# Patient Record
Sex: FEMALE | Race: WHITE | NOT HISPANIC OR LATINO | Employment: FULL TIME | URBAN - METROPOLITAN AREA
[De-identification: names, ages, dates, MRNs, and addresses within clinical notes are randomized per-mention and may not be internally consistent; named-entity substitution may affect disease eponyms.]

---

## 2017-01-31 RX ORDER — FLUOXETINE 10 MG/1
CAPSULE ORAL
Qty: 30 CAPSULE | Refills: 0 | Status: SHIPPED | OUTPATIENT
Start: 2017-01-31 | End: 2018-03-16 | Stop reason: ALTCHOICE

## 2017-02-06 RX ORDER — BUDESONIDE 3 MG/1
CAPSULE, COATED PELLETS ORAL
Qty: 90 CAPSULE | Refills: 0 | Status: SHIPPED | OUTPATIENT
Start: 2017-02-06 | End: 2017-03-09 | Stop reason: SDUPTHER

## 2017-03-10 RX ORDER — BUDESONIDE 3 MG/1
CAPSULE, COATED PELLETS ORAL
Qty: 90 CAPSULE | Refills: 0 | Status: SHIPPED | OUTPATIENT
Start: 2017-03-10 | End: 2017-04-09

## 2017-03-29 ENCOUNTER — OFFICE VISIT (OUTPATIENT)
Dept: OBSTETRICS AND GYNECOLOGY | Facility: CLINIC | Age: 27
End: 2017-03-29
Payer: COMMERCIAL

## 2017-03-29 VITALS
BODY MASS INDEX: 25.2 KG/M2 | HEIGHT: 63 IN | DIASTOLIC BLOOD PRESSURE: 70 MMHG | WEIGHT: 142.19 LBS | SYSTOLIC BLOOD PRESSURE: 120 MMHG

## 2017-03-29 DIAGNOSIS — Z11.3 SCREEN FOR STD (SEXUALLY TRANSMITTED DISEASE): ICD-10-CM

## 2017-03-29 DIAGNOSIS — Z01.419 ENCOUNTER FOR GYNECOLOGICAL EXAMINATION: Primary | ICD-10-CM

## 2017-03-29 PROCEDURE — 99395 PREV VISIT EST AGE 18-39: CPT | Mod: S$GLB,,, | Performed by: OBSTETRICS & GYNECOLOGY

## 2017-03-29 PROCEDURE — 99999 PR PBB SHADOW E&M-EST. PATIENT-LVL II: CPT | Mod: PBBFAC,,, | Performed by: OBSTETRICS & GYNECOLOGY

## 2017-03-29 PROCEDURE — 87591 N.GONORRHOEAE DNA AMP PROB: CPT

## 2017-03-29 RX ORDER — MISOPROSTOL 200 UG/1
200 TABLET ORAL SEE ADMIN INSTRUCTIONS
Qty: 2 TABLET | Refills: 0 | Status: SHIPPED | OUTPATIENT
Start: 2017-03-29 | End: 2017-07-07 | Stop reason: ALTCHOICE

## 2017-03-29 NOTE — PROGRESS NOTES
HPI: Pt is a 27 y.o.  female who presents for routine annual exam. She uses nothing for contraception as she has NEVER been sexually active.  She is in a new relationship and is considering becoming sexually active and would like to have IUD placed.      ROS:  GENERAL: Feeling well overall. Denies fever or chills.   SKIN: Denies rash or lesions.   HEAD: Denies head injury or headache.   NODES: Denies enlarged lymph nodes.   CHEST: Denies chest pain or shortness of breath.   CARDIOVASCULAR: Denies palpitations or left sided chest pain.   ABDOMEN: No abdominal pain, constipation, diarrhea, nausea, vomiting or rectal bleeding.   URINARY: No dysuria, hematuria, or burning on urination.  REPRODUCTIVE: See HPI.   BREASTS: Denies pain, lumps, or nipple discharge.   HEMATOLOGIC: No easy bruisability or excessive bleeding.   MUSCULOSKELETAL: Denies joint pain or swelling.   NEUROLOGIC: Denies syncope or weakness.   PSYCHIATRIC: Denies depression, anxiety or mood swings.    PE:   APPEARANCE: Well nourished, well developed, White female in no acute distress.  NODES: no cervical, supraclavicular, or inguinal lymphadenopathy  BREASTS: Symmetrical, no skin changes or visible lesions. No palpable masses, nipple discharge or adenopathy bilaterally.  ABDOMEN: Soft. No tenderness or masses. No distention. No hernias palpated. No CVA tenderness.  VULVA: No lesions. Normal external female genitalia.  URETHRAL MEATUS: Normal size and location, no lesions, no prolapse.  URETHRA: No masses, tenderness, or prolapse.  VAGINA: Moist. No lesions or lacerations noted. No abnormal discharge present. No odor present.   CERVIX: No lesions or discharge. No cervical motion tenderness.   UTERUS: Normal size, regular shape, mobile, non-tender.  ADNEXA: No tenderness. No fullness or masses palpated in the adnexal regions.   ANUS PERINEUM: Normal.      Diagnosis:  1. Encounter for gynecological examination    2. Screen for STD (sexually transmitted  disease)        Plan:     Orders Placed This Encounter    C. trachomatis/N. gonorrhoeae by AMP DNA Cervix    misoprostol (CYTOTEC) 200 MCG Tab       - STD screening done as she plans for IUD  - Belkys verification  - will call with next menses    Patient was counseled today on the new ACS guidelines for cervical cytology screening as well as the current recommendations for breast cancer screening. She was counseled to follow up with her PCP for other routine health maintenance. Counseling session lasted approximately 10 minutes, and all her questions were answered.    Follow-up with me at next menses for Belkys insertion

## 2017-03-29 NOTE — MR AVS SNAPSHOT
"    [facility] [address]  [address]  [facility] [address]  Phone: [phone]  Fax: [phone]                  Terri Tavera   3/29/2017 3:45 PM   Office Visit    Description:  Female : 1990   Provider:  Chiara Olivarez MD   Department:  [facility] [address]           Reason for Visit     Gynecologic Exam                To Do List           Goals (5 Years of Data)     None      [facility]     [facility] On [facility] Nurse [facility] -  Assistance  Registered nurses in the [facility] On Call Center provide clinical advisement, health education, appointment booking, and other advisory services.  Call for this free service at [phone].             Medications           Message regarding Medications     Verify the changes and/or additions to your medication regime listed below are the same as discussed with your clinician today.  If any of these changes or additions are incorrect, please notify your healthcare provider.             Verify that the below list of medications is an accurate representation of the medications you are currently taking.  If none reported, the list may be blank. If incorrect, please contact your healthcare provider. Carry this list with you in case of emergency.           Current Medications     alprazolam (XANAX) 0.5 MG tablet Take 0.5 mg by mouth nightly as needed.      citalopram (CELEXA) 40 MG tablet            Clinical Reference Information           Your Vitals Were     BP Height Weight Last Period BMI    120/70 5' 3" (1.6 m) 64.5 kg (142 lb 3.2 oz) 03/15/2017 (Exact Date) 25.19 kg/m2      Blood Pressure          Most Recent Value    BP  120/70      Allergies as of 3/29/2017     Duricef [Cefadroxil]    Vitamin K Analogues    Bactrim [Sulfamethoxazole-trimethoprim]      Immunizations Administered on Date of Encounter - 3/29/2017     None      Language Assistance Services     ATTENTION: Language assistance services are available, free of " charge. Please call [phone].      ATENCIÓN: Si habla español, tiene a haley disposición servicios gratuitos de asistencia lingüística. Llame al [phone].     CHÚ Ý: N?u b?n nói Ti?ng Vi?t, có các d?ch v? h? tr? ngôn ng? mi?n phí dành cho b?n. G?i s? [phone].         [facility] [address] complies with applicable Federal civil rights laws and does not discriminate on the basis of race, color, national origin, age, disability, or sex.

## 2017-03-31 LAB
C TRACH DNA SPEC QL NAA+PROBE: NOT DETECTED
N GONORRHOEA DNA SPEC QL NAA+PROBE: NOT DETECTED

## 2017-04-07 ENCOUNTER — PROCEDURE VISIT (OUTPATIENT)
Dept: OBSTETRICS AND GYNECOLOGY | Facility: CLINIC | Age: 27
End: 2017-04-07
Payer: COMMERCIAL

## 2017-04-07 VITALS
SYSTOLIC BLOOD PRESSURE: 120 MMHG | BODY MASS INDEX: 24.76 KG/M2 | WEIGHT: 139.75 LBS | DIASTOLIC BLOOD PRESSURE: 70 MMHG | HEIGHT: 63 IN

## 2017-04-07 DIAGNOSIS — Z30.430 ENCOUNTER FOR INSERTION OF INTRAUTERINE CONTRACEPTIVE DEVICE: Primary | ICD-10-CM

## 2017-04-07 PROCEDURE — 58300 INSERT INTRAUTERINE DEVICE: CPT | Mod: S$GLB,,, | Performed by: OBSTETRICS & GYNECOLOGY

## 2017-04-07 NOTE — PROCEDURES
Procedures     DATE: April 7th, 2017    TIME: 12:30 pm    PROCEDURE:  Belkys insertion    INDICATION: Contraception    PATIENT CONSENT: She was counseled on the risks, benefits, indications, and alternatives to IUD use.  She understands that with insertion there is a risk of bleeding, infection, and uterine perforation.  All questions are answered.  Consents signed.     LABS: UPT is negative.     Cervical cultures were not performed.    ANESTHESIA: NONE    PROCEDURE NOTE:    Time out performed.  The cervix was visualized with a speculum.  A single tooth tenaculum was placed on the anterior lip of the cervix.  The uterus sounds to 6cm using sterile technique.  A Belkys was loaded and placed high in the uterine fundus without difficulty using sterile technique.  The string was was then cut.  The tenaculum and speculum were removed.    COMPLICATIONS: None    PATIENT DISPOSITION: The patient tolerated the procedure well.    Assessment:  1.  Contraceptive management/IUD insertion    Post IUD placement counseling:  Manage post IUD placement pain with NSAIDS, Tylenol or Rx per Medcard.  IUD danger signs and how to check for strings were discussed.  The IUD needs to be removed in 5 years for Mirena and 10 years for Copper IUD.    Follow up:  4 weeks for string check      Chiara Olivarez MD 04/07/2017 2:35 PM

## 2017-04-21 ENCOUNTER — PATIENT MESSAGE (OUTPATIENT)
Dept: OBSTETRICS AND GYNECOLOGY | Facility: CLINIC | Age: 27
End: 2017-04-21

## 2017-05-05 ENCOUNTER — OFFICE VISIT (OUTPATIENT)
Dept: PODIATRY | Facility: CLINIC | Age: 27
End: 2017-05-05
Payer: COMMERCIAL

## 2017-05-05 VITALS
HEART RATE: 85 BPM | DIASTOLIC BLOOD PRESSURE: 72 MMHG | WEIGHT: 139 LBS | SYSTOLIC BLOOD PRESSURE: 112 MMHG | BODY MASS INDEX: 24.63 KG/M2 | HEIGHT: 63 IN

## 2017-05-05 DIAGNOSIS — L03.032 PARONYCHIA, TOE, LEFT: ICD-10-CM

## 2017-05-05 DIAGNOSIS — L60.0 INGROWN NAIL: Primary | ICD-10-CM

## 2017-05-05 DIAGNOSIS — M79.676 PAIN OF TOE, UNSPECIFIED LATERALITY: ICD-10-CM

## 2017-05-05 PROCEDURE — 11719 TRIM NAIL(S) ANY NUMBER: CPT | Mod: S$GLB,,, | Performed by: PODIATRIST

## 2017-05-05 PROCEDURE — 1160F RVW MEDS BY RX/DR IN RCRD: CPT | Mod: S$GLB,,, | Performed by: PODIATRIST

## 2017-05-05 PROCEDURE — 99213 OFFICE O/P EST LOW 20 MIN: CPT | Mod: 25,S$GLB,, | Performed by: PODIATRIST

## 2017-05-05 PROCEDURE — 99999 PR PBB SHADOW E&M-EST. PATIENT-LVL III: CPT | Mod: PBBFAC,,, | Performed by: PODIATRIST

## 2017-05-05 RX ORDER — TOBRAMYCIN 3 MG/ML
2 SOLUTION/ DROPS OPHTHALMIC 2 TIMES DAILY
Qty: 5 ML | Refills: 3 | Status: SHIPPED | OUTPATIENT
Start: 2017-05-05 | End: 2017-05-15

## 2017-05-05 NOTE — PROGRESS NOTES
Subjective:      Patient ID: Terri Tavera is a 27 y.o. female.    Chief Complaint: Ingrown Toenail (lt great toe)    Sharp deep pain and ingrown nail medial left hallux.  Gradual onset, worsening over past several weeks, aggravated by increased weight bearing, shoe gear, pressure.  No previous medical treatment.  OTC pain med not helping.  Denies trauma.  Relates nail procedure lateral left hallux with good result    Review of Systems   Constitution: Negative for chills, diaphoresis, fever, malaise/fatigue and night sweats.   Cardiovascular: Negative for claudication, cyanosis, leg swelling and syncope.   Skin: Positive for nail changes. Negative for color change, dry skin, rash, suspicious lesions and unusual hair distribution.   Musculoskeletal: Negative for falls, joint pain, joint swelling, muscle cramps, muscle weakness and stiffness.   Gastrointestinal: Negative for constipation, diarrhea, nausea and vomiting.   Neurological: Negative for brief paralysis, disturbances in coordination, focal weakness, numbness, paresthesias, sensory change and tremors.           Objective:      Physical Exam   Constitutional: She appears well-developed and well-nourished. She is cooperative. No distress.   Cardiovascular:   Pulses:       Popliteal pulses are 2+ on the right side, and 2+ on the left side.        Dorsalis pedis pulses are 2+ on the right side, and 2+ on the left side.        Posterior tibial pulses are 2+ on the right side, and 2+ on the left side.   Capillary refill 3 seconds all toes/distal feet, all toes/both feet warm to touch.      Negative lymphadenopathy bilateral popliteal fossa and tarsal tunnel.      Negavie lower extremity edema bilateral.     Musculoskeletal:        Right ankle: Normal. She exhibits normal range of motion, no swelling, no ecchymosis, no deformity, no laceration and normal pulse. Achilles tendon normal. Achilles tendon exhibits no pain, no defect and normal Hester's test results.    Normal angle, base, station of gait. All ten toes without clubbing, cyanosis, or signs of ischemia.  No pain to palpation bilateral lower extremities.  Range of motion, stability, muscle strength, and muscle tone normal bilateral feet and legs.     Lymphadenopathy: No inguinal adenopathy noted on the right or left side.   Negative lymphadenopathy bilateral popliteal fossa and tarsal tunnel.   Neurological: She is alert. She has normal strength. She displays no atrophy and no tremor. No sensory deficit. She exhibits normal muscle tone. She displays no seizure activity. Gait normal.   Reflex Scores:       Patellar reflexes are 2+ on the right side and 2+ on the left side.       Achilles reflexes are 2+ on the right side and 2+ on the left side.  Negative tinel sign to percussion sural, superficial peroneal, deep peroneal, saphenous, and posterior tibial nerves right and left ankles and feet.     Skin: Skin is warm, dry and intact. No abrasion, no bruising, no burn, no ecchymosis, no laceration, no lesion and no rash noted. She is not diaphoretic. No cyanosis or erythema. No pallor. Nails show no clubbing.   Visible and palpable ingrowth of toenail lateral border right hallux with pain to palpation, and focal localized erythema and edema,  without ulceration, drainage, pus, tracking, fluctuance, malodor, or cardinal signs infection.    Visible and palpable ingrowth of toenail medial border left hallux with pain to palpation, and focal localized erythema and edema,  without ulceration, drainage, pus, tracking, fluctuance, malodor, or cardinal signs infection.                 Assessment:       Encounter Diagnoses   Name Primary?    Ingrown nail Yes    Pain of toe, unspecified laterality     Paronychia, toe, left          Plan:       Terri was seen today for ingrown toenail.    Diagnoses and all orders for this visit:    Ingrown nail    Pain of toe, unspecified laterality    Paronychia, toe, left    Other orders  -      tobramycin sulfate 0.3% (TOBREX) 0.3 % ophthalmic solution; Place 2 drops into the left eye 2 (two) times daily.      I counseled the patient on her conditions, their implications and medical management.    Rx tobramycin drops bid.  Cover left hallux all times with band aid changing daily.    Discussed conservative treatment with shoes of adequate dimensions, material, and style to alleviate symptoms and delay or prevent surgical intervention.     Utilizing sterile toenail clippers I aggressively debrided the offending nail border approximately 3 mm from its edge and carried the nail plate incision down at an angle in order to wedge out the offending cryptotic portion of the nail plate. The offending border was then removed in toto. The area was cleansed with alcohol. Patient tolerated the procedure well and related significant relief.          Return in about 1 week (around 5/12/2017) for paronychia medial elft hallux.

## 2017-05-05 NOTE — MR AVS SNAPSHOT
[facility] [address] [facility]  [address]  [facility] [address]  Phone: [phone]                  Terri Tavera   2017 7:00 AM   Office Visit    Description:  Female : 1990   Provider:  Brandon Fuller DPM   Department:  [facility]           Reason for Visit     Ingrown Toenail           Diagnoses this Visit        Comments    Ingrown nail    -  Primary     Pain of toe, unspecified laterality         Paronychia, toe, left                To Do List           Future Appointments        Provider Department Dept Phone    2017 7:45 AM Brandon Fuller DPM [facility] [address] [facility] [phone]    2017 3:30 PM Chiara Olivarez MD [facility] [address] [phone]      Goals (5 Years of Data)     None      Follow-Up and Disposition     Return in about 1 week (around 2017) for paronychia medial elft hallux.    Follow-up and Disposition History       These Medications        Disp Refills Start End    tobramycin sulfate 0.3% (TOBREX) 0.3 % ophthalmic solution 5 mL 3 2017 5/15/2017    Place 2 drops into the left eye 2 (two) times daily. - Left Eye    Pharmacy: [facility]/pharmacy #[id number] - [place] [address] #: [phone]         [facility] On Call     [facility] On Call Nurse Care Line -  Assistance  Unless otherwise directed by your provider, please contact [facility], our nurse care line that is available for  assistance.     Registered nurses in the [facility] On Call Center provide: appointment scheduling, clinical advisement, health education, and other advisory services.  Call: [phone] (toll free)               Medications           Message regarding Medications     Verify the changes and/or additions to your medication regime listed below are the same as discussed with your clinician today.  If any of these changes or additions are incorrect, please notify your healthcare provider.        START taking these NEW medications         "Refills    tobramycin sulfate 0.3% (TOBREX) 0.3 % ophthalmic solution 3    Sig: Place 2 drops into the left eye 2 (two) times daily.    Class: Normal    Route: Left Eye           Verify that the below list of medications is an accurate representation of the medications you are currently taking.  If none reported, the list may be blank. If incorrect, please contact your healthcare provider. Carry this list with you in case of emergency.           Current Medications     alprazolam (XANAX) 0.5 MG tablet Take 0.5 mg by mouth nightly as needed.      citalopram (CELEXA) 40 MG tablet     misoprostol (CYTOTEC) 200 MCG Tab Take 1 tablet (200 mcg total) by mouth As instructed. 1 pill night prior to procedure and 1 pill morning of procedure.    tobramycin sulfate 0.3% (TOBREX) 0.3 % ophthalmic solution Place 2 drops into the left eye 2 (two) times daily.           Clinical Reference Information           Your Vitals Were     BP Pulse Height Weight BMI    112/72 85 5' 3" (1.6 m) 63 kg (139 lb) 24.62 kg/m2      Blood Pressure          Most Recent Value    BP  112/72      Allergies as of 5/5/2017     Duricef [Cefadroxil]    Vitamin K Analogues    Bactrim [Sulfamethoxazole-trimethoprim]      Immunizations Administered on Date of Encounter - 5/5/2017     None      Language Assistance Services     ATTENTION: Language assistance services are available, free of charge. Please call [phone].      ATENCIÓN: Si habla español, tiene a haley disposición servicios gratuitos de asistencia lingüística. Llame al [phone].     LEEROY Ý: N?u b?n nói Ti?ng Vi?t, có các d?ch v? h? tr? ngôn ng? mi?n phí dành cho b?n. G?i s? [phone].         [facility] complies with applicable Federal civil rights laws and does not discriminate on the basis of race, color, national origin, age, disability, or sex.        "

## 2017-05-18 ENCOUNTER — OFFICE VISIT (OUTPATIENT)
Dept: OBSTETRICS AND GYNECOLOGY | Facility: CLINIC | Age: 27
End: 2017-05-18
Payer: COMMERCIAL

## 2017-05-18 VITALS
SYSTOLIC BLOOD PRESSURE: 100 MMHG | WEIGHT: 140 LBS | BODY MASS INDEX: 24.8 KG/M2 | HEIGHT: 63 IN | DIASTOLIC BLOOD PRESSURE: 60 MMHG

## 2017-05-18 DIAGNOSIS — Z30.431 IUD CHECK UP: Primary | ICD-10-CM

## 2017-05-18 PROCEDURE — 99999 PR PBB SHADOW E&M-EST. PATIENT-LVL II: CPT | Mod: PBBFAC,,, | Performed by: OBSTETRICS & GYNECOLOGY

## 2017-05-18 PROCEDURE — 99212 OFFICE O/P EST SF 10 MIN: CPT | Mod: S$GLB,,, | Performed by: OBSTETRICS & GYNECOLOGY

## 2017-05-18 NOTE — MR AVS SNAPSHOT
"    [facility] [address]  [address]  [facility] [address]  Phone: [phone]  Fax: [phone]                  Terri Tavera   2017 3:30 PM   Office Visit    Description:  Female : 1990   Provider:  Chiara Olivarez MD   Department:  [facility] [address]           Reason for Visit     IUD Check                To Do List           Goals (5 Years of Data)     None      [facility]     [facility] On Call Nurse Care Line -  Assistance  Unless otherwise directed by your provider, please contact [facility], our nurse care line that is available for  assistance.     Registered nurses in the [facility] Center provide: appointment scheduling, clinical advisement, health education, and other advisory services.  Call: [phone] (toll free)               Medications           Message regarding Medications     Verify the changes and/or additions to your medication regime listed below are the same as discussed with your clinician today.  If any of these changes or additions are incorrect, please notify your healthcare provider.             Verify that the below list of medications is an accurate representation of the medications you are currently taking.  If none reported, the list may be blank. If incorrect, please contact your healthcare provider. Carry this list with you in case of emergency.           Current Medications     alprazolam (XANAX) 0.5 MG tablet Take 0.5 mg by mouth nightly as needed.      citalopram (CELEXA) 40 MG tablet     misoprostol (CYTOTEC) 200 MCG Tab Take 1 tablet (200 mcg total) by mouth As instructed. 1 pill night prior to procedure and 1 pill morning of procedure.           Clinical Reference Information           Your Vitals Were     BP Height Weight Last Period BMI    100/60 5' 3" (1.6 m) 63.5 kg (139 lb 15.9 oz) 2017 (Exact Date) 24.8 kg/m2      Blood Pressure          Most Recent Value    BP  100/60      Allergies as " of 5/18/2017     Duricef [Cefadroxil]    Vitamin K Analogues    Bactrim [Sulfamethoxazole-trimethoprim]      Immunizations Administered on Date of Encounter - 5/18/2017     None      Language Assistance Services     ATTENTION: Language assistance services are available, free of charge. Please call [phone].      ATENCIÓN: Si habla gildardo, tiene a haley disposición servicios gratuitos de asistencia lingüística. Llame al [phone].     CHÚ Ý: N?u b?n nói Ti?ng Vi?t, có các d?ch v? h? tr? ngôn ng? mi?n phí dành cho b?n. G?i s? [phone].         [facility] [address] complies with applicable Federal civil rights laws and does not discriminate on the basis of race, color, national origin, age, disability, or sex.

## 2017-05-18 NOTE — PROGRESS NOTES
CC: IUD check    Terri Tavera is a 27 y.o. female  presents for IUD check.  Patient had a Belkys placed by me on 2017.  She is not having problems with bleeding, cramping, fever or discharge.  She is not able to feel the strings.      PHYSICAL EXAM:  Abdomen:  Soft, non-tender, non-distended  Vulva:  No lesions  Vaginal:  No lesions, no abnormal discharge  Cervix: No discharge, no CMT, IUD strings visible at os (1 cm out)  Uterus:  Normal size, non-tender  Adnexa:  No masses, non-tender    ASSESSMENT AND PLAN:  1. IUD surveillance    Patient counseled on IUD danger signs and how to check the strings reviewed.    Return for annual GYN exam

## 2017-07-07 ENCOUNTER — OFFICE VISIT (OUTPATIENT)
Dept: PODIATRY | Facility: CLINIC | Age: 27
End: 2017-07-07
Payer: COMMERCIAL

## 2017-07-07 VITALS
HEART RATE: 74 BPM | DIASTOLIC BLOOD PRESSURE: 72 MMHG | BODY MASS INDEX: 23.92 KG/M2 | SYSTOLIC BLOOD PRESSURE: 110 MMHG | RESPIRATION RATE: 18 BRPM | HEIGHT: 63 IN | WEIGHT: 135 LBS

## 2017-07-07 DIAGNOSIS — M79.675 TOE PAIN, LEFT: ICD-10-CM

## 2017-07-07 DIAGNOSIS — L03.032 PARONYCHIA OF TOENAIL, LEFT: Primary | ICD-10-CM

## 2017-07-07 PROCEDURE — 99213 OFFICE O/P EST LOW 20 MIN: CPT | Mod: 25,S$GLB,, | Performed by: PODIATRIST

## 2017-07-07 PROCEDURE — 99999 PR PBB SHADOW E&M-EST. PATIENT-LVL III: CPT | Mod: PBBFAC,,, | Performed by: PODIATRIST

## 2017-07-07 PROCEDURE — 11750 EXCISION NAIL&NAIL MATRIX: CPT | Mod: TA,S$GLB,, | Performed by: PODIATRIST

## 2017-07-07 NOTE — PROGRESS NOTES
"Subjective:      Patient ID: Terri Tavera is a 27 y.o. female.    Chief Complaint: PCP (Ana Carmona MD 2014); Nail Problem (left great toenail ); Ingrown Toenail; and Toe Pain    Terri is a 27 y.o. female who presents to the clinic complaining of painful ingrown toenail on the left foot.    Review of Systems   Constitution: Negative for chills, decreased appetite and fever.   Cardiovascular: Negative for leg swelling.   Skin: Positive for nail changes.   Musculoskeletal: Negative for arthritis, joint pain, joint swelling and myalgias.   Gastrointestinal: Negative for nausea and vomiting.   Neurological: Negative for loss of balance, numbness and paresthesias.           Objective:       Vitals:    07/07/17 1119   BP: 110/72   Pulse: 74   Resp: 18   Weight: 61.2 kg (135 lb)   Height: 5' 3" (1.6 m)   PainSc:   3   PainLoc: Toe        Physical Exam   Constitutional: She is oriented to person, place, and time. She appears well-developed and well-nourished.   Cardiovascular:   Pulses:       Dorsalis pedis pulses are 2+ on the right side, and 2+ on the left side.        Posterior tibial pulses are 2+ on the right side, and 2+ on the left side.   Musculoskeletal: She exhibits no edema or tenderness.        Right ankle: Normal.        Left ankle: Normal.        Right foot: There is no swelling, no crepitus and no deformity.        Left foot: There is no swelling, no crepitus and no deformity.   Adequate joint range of motion without pain, limitation, nor crepitation Bilateral feet and ankle joints. Muscle strength is 5/5 in all groups bilaterally.         Lymphadenopathy:   No palpable lymph nodes   Neurological: She is alert and oriented to person, place, and time. She has normal strength.   Skin: Skin is warm, dry and intact. No rash noted. No erythema. Nails show no clubbing.   lateral hallux nail margin of l foot with ingrown nail plate. Surrounding erythema and minimal edema is noted there is no granuloma " formation noted. no malodor      Psychiatric: She has a normal mood and affect. Her behavior is normal.             Assessment:       Encounter Diagnoses   Name Primary?    Paronychia of toenail, left Yes    Toe pain, left          Plan:       Terri was seen today for pcp, nail problem, ingrown toenail and toe pain.    Diagnoses and all orders for this visit:    Paronychia of toenail, left    Toe pain, left      I counseled the patient on her conditions, their implications and medical management.      Treatment options discussed with patient.  Patient would like permanent procedure.  Patient understands a 5-10% recurrence rate of ingrown nail.  Patient understands all potential risks and complications as well as alternatives.  No guarantees are given or implied as to the outcome.  Consent forms read signed witnessed and the chart.  See op report.    MATRIXECTOMY OP REPORT    SURGEON: Adela Blevins DPM    PRE-OP DX: onychocryptosis    POST-OP DX: same    PROCEDURE: left lateral hallux  matrixectomy      ANESTHESIA: local    HEMOSTASIS: penrose digital tourniquet for less then 3 minutes.    EBL: Less than 5 cc    After obtaining verbal and written consent for nail procedure, I injected the toe via digital block with 3 cc of  2% Xylocaine plain  for anesthesia. After anesthesia was achieved,Tourniquet applied to toe. The area was cleansed with betadine. Next I incised the nail border approximately 3 mm from its edge and carried the nail plate incision down the entire length of the nail plate to the matrix area. The offending border was freed from all fibrous adhesions and removed from the operative site in toto.  Phenol 90% was then applied to the matrix area 3 times for a total of 90 seconds. The tourniquet was released and CFT was immediate. The area was flushed with alcohol and dried, and dressed with antibiotic cream and a dry, sterile, compressive dressing. Patient tolerated the procedure well. Written and  verbal post-operative instructions were dispensed to the patient on post-operative nail care. Pt. to follow-up in one week but should call immediately if any signs of infection, such as fever, chills, sweats, increased redness or pain.

## 2017-07-12 ENCOUNTER — PATIENT MESSAGE (OUTPATIENT)
Dept: PODIATRY | Facility: CLINIC | Age: 27
End: 2017-07-12

## 2017-07-13 ENCOUNTER — TELEPHONE (OUTPATIENT)
Dept: PODIATRY | Facility: CLINIC | Age: 27
End: 2017-07-13

## 2017-07-13 NOTE — TELEPHONE ENCOUNTER
Spoke with patient who reports reddness at border of nail procedure.  Related use of Phenol can cause pain and discoloration for up to 2 weeks.  Offered appt but she said she will observe and let us know if no improvement.  I told her I did not see a f/u appt scheduled and she feels that she will call us back if needed.

## 2017-08-15 ENCOUNTER — TELEPHONE (OUTPATIENT)
Dept: GASTROENTEROLOGY | Age: 27
End: 2017-08-15

## 2017-08-15 DIAGNOSIS — K21.00 GASTROESOPHAGEAL REFLUX DISEASE WITH ESOPHAGITIS: ICD-10-CM

## 2017-08-15 RX ORDER — OMEPRAZOLE 40 MG/1
40 CAPSULE, DELAYED RELEASE ORAL
Qty: 60 CAPSULE | Refills: 9 | Status: SHIPPED | OUTPATIENT
Start: 2017-08-15 | End: 2018-03-23 | Stop reason: ALTCHOICE

## 2017-08-24 ENCOUNTER — OFFICE VISIT (OUTPATIENT)
Dept: PEDIATRIC HEMATOLOGY/ONCOLOGY | Facility: CLINIC | Age: 27
End: 2017-08-24
Payer: COMMERCIAL

## 2017-08-24 ENCOUNTER — LAB VISIT (OUTPATIENT)
Dept: LAB | Facility: HOSPITAL | Age: 27
End: 2017-08-24
Attending: PEDIATRICS
Payer: COMMERCIAL

## 2017-08-24 VITALS
TEMPERATURE: 99 F | RESPIRATION RATE: 20 BRPM | BODY MASS INDEX: 23.18 KG/M2 | HEIGHT: 64 IN | SYSTOLIC BLOOD PRESSURE: 111 MMHG | WEIGHT: 135.81 LBS | DIASTOLIC BLOOD PRESSURE: 74 MMHG | HEART RATE: 71 BPM

## 2017-08-24 DIAGNOSIS — C91.01 ACUTE LYMPHOBLASTIC LEUKEMIA (ALL) IN REMISSION: Primary | ICD-10-CM

## 2017-08-24 DIAGNOSIS — C91.01 ACUTE LYMPHOBLASTIC LEUKEMIA (ALL) IN REMISSION: ICD-10-CM

## 2017-08-24 LAB
ALBUMIN SERPL BCP-MCNC: 4 G/DL
ALP SERPL-CCNC: 62 U/L
ALT SERPL W/O P-5'-P-CCNC: 16 U/L
ANION GAP SERPL CALC-SCNC: 8 MMOL/L
AST SERPL-CCNC: 25 U/L
BASOPHILS # BLD AUTO: 0.03 K/UL
BASOPHILS NFR BLD: 0.3 %
BILIRUB SERPL-MCNC: 1.6 MG/DL
BUN SERPL-MCNC: 15 MG/DL
CALCIUM SERPL-MCNC: 9.7 MG/DL
CHLORIDE SERPL-SCNC: 105 MMOL/L
CO2 SERPL-SCNC: 26 MMOL/L
CREAT SERPL-MCNC: 0.8 MG/DL
DIFFERENTIAL METHOD: NORMAL
EOSINOPHIL # BLD AUTO: 0.1 K/UL
EOSINOPHIL NFR BLD: 0.9 %
ERYTHROCYTE [DISTWIDTH] IN BLOOD BY AUTOMATED COUNT: 12.7 %
EST. GFR  (AFRICAN AMERICAN): >60 ML/MIN/1.73 M^2
EST. GFR  (NON AFRICAN AMERICAN): >60 ML/MIN/1.73 M^2
GLUCOSE SERPL-MCNC: 89 MG/DL
HCT VFR BLD AUTO: 38.8 %
HGB BLD-MCNC: 13.6 G/DL
IGA SERPL-MCNC: 374 MG/DL
IGG SERPL-MCNC: 772 MG/DL
IGM SERPL-MCNC: 102 MG/DL
LYMPHOCYTES # BLD AUTO: 2.3 K/UL
LYMPHOCYTES NFR BLD: 22.6 %
MCH RBC QN AUTO: 30.2 PG
MCHC RBC AUTO-ENTMCNC: 35.1 G/DL
MCV RBC AUTO: 86 FL
MONOCYTES # BLD AUTO: 0.8 K/UL
MONOCYTES NFR BLD: 7.9 %
NEUTROPHILS # BLD AUTO: 6.9 K/UL
NEUTROPHILS NFR BLD: 68.3 %
PLATELET # BLD AUTO: 233 K/UL
PMV BLD AUTO: 9.4 FL
POTASSIUM SERPL-SCNC: 4 MMOL/L
PROT SERPL-MCNC: 7.1 G/DL
RBC # BLD AUTO: 4.51 M/UL
RETICS/RBC NFR AUTO: 0.9 %
SODIUM SERPL-SCNC: 139 MMOL/L
T4 FREE SERPL-MCNC: 1.02 NG/DL
TSH SERPL DL<=0.005 MIU/L-ACNC: 0.58 UIU/ML
WBC # BLD AUTO: 10.06 K/UL

## 2017-08-24 PROCEDURE — 84439 ASSAY OF FREE THYROXINE: CPT

## 2017-08-24 PROCEDURE — 82784 ASSAY IGA/IGD/IGG/IGM EACH: CPT | Mod: 59

## 2017-08-24 PROCEDURE — 99999 PR PBB SHADOW E&M-EST. PATIENT-LVL III: CPT | Mod: PBBFAC,,, | Performed by: PEDIATRICS

## 2017-08-24 PROCEDURE — 85045 AUTOMATED RETICULOCYTE COUNT: CPT | Mod: PO

## 2017-08-24 PROCEDURE — 99214 OFFICE O/P EST MOD 30 MIN: CPT | Mod: S$GLB,,, | Performed by: PEDIATRICS

## 2017-08-24 PROCEDURE — 80053 COMPREHEN METABOLIC PANEL: CPT

## 2017-08-24 PROCEDURE — 85025 COMPLETE CBC W/AUTO DIFF WBC: CPT | Mod: PO

## 2017-08-24 PROCEDURE — 36415 COLL VENOUS BLD VENIPUNCTURE: CPT | Mod: PO

## 2017-08-24 PROCEDURE — 3008F BODY MASS INDEX DOCD: CPT | Mod: S$GLB,,, | Performed by: PEDIATRICS

## 2017-08-24 PROCEDURE — 84443 ASSAY THYROID STIM HORMONE: CPT

## 2017-08-24 NOTE — PROGRESS NOTES
Subjective:       Patient ID: Terri Tavera is a 27 y.o. female.    Chief Complaint: Leukemia and Follow-up    HPI Terri is doing well.  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  Good energy level.     No new ER visits.  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 Achieved remission on DFCI 2000-01 as a high risk patient. She received cranial irradiation. She remains in complete continuous remission. Therapy was completed in March of 2004.    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 She last had IVIG in May 2006.      Review of Systems   Constitutional: Negative for activity change, appetite change, fatigue and fever.   HENT: Negative for congestion, hearing loss, mouth sores, nosebleeds, rhinorrhea and sneezing.    Eyes: Negative for photophobia and visual disturbance.   Respiratory: Negative for cough, chest tightness, shortness of breath and wheezing.    Cardiovascular: Negative for chest pain, palpitations and leg swelling.   Gastrointestinal: Negative for abdominal distention, blood in stool, constipation, diarrhea, nausea and vomiting.   Genitourinary: Negative for difficulty urinating, hematuria, menstrual problem and pelvic pain.   Musculoskeletal: Negative for gait problem and neck pain.   Skin: Negative for pallor and rash.   Neurological: Negative for dizziness, weakness, light-headedness and headaches.   Hematological: Negative for adenopathy. Does not bruise/bleed easily.   Psychiatric/Behavioral: Negative for behavioral problems.       Objective:      Physical Exam    Constitutional: She is oriented to person, place, and time. She appears well-developed and well-nourished.   HENT:   Head: Normocephalic and atraumatic.   Right Ear: External ear normal.   Left Ear: External ear normal.   Nose: Nose normal.   Mouth/Throat: Oropharynx is clear and moist.   Eyes: EOM are normal. Pupils are equal, round, and reactive to light.   Neck: Normal range of motion. Neck supple.   Cardiovascular: Normal rate, regular rhythm, normal heart sounds and intact distal pulses.  Exam reveals no gallop and no friction rub.    No murmur heard.  Pulmonary/Chest: Breath sounds normal. No respiratory distress. She has no wheezes. She has no rales. She exhibits no tenderness.   Abdominal: Soft. Bowel sounds are normal. She exhibits no distension and no mass. There is no tenderness. There is no rebound and no guarding.   Musculoskeletal: Normal range of motion. She exhibits no edema or tenderness.   Lymphadenopathy:     She has no cervical adenopathy.   Neurological: She is alert and oriented to person, place, and time. No cranial nerve deficit.   Skin: Skin is warm and dry. No rash noted. No erythema. No pallor.       Assessment:       1. Acute lymphoblastic leukemia (ALL) in remission        Plan:       ALL in remission; hypogammaglobulinemia resolved. Mild alopecia likely secondary to radiation    PLAN: F/U in one year  Rpt IgG, Thyroid studies level today  ECHO   - will repeat next year  Cont alopecia therapy as per derm  Cont hip therapy as per PT    around 25 min was spent with pt >50% in counselling

## 2017-09-11 ENCOUNTER — TELEPHONE (OUTPATIENT)
Dept: GASTROENTEROLOGY | Age: 27
End: 2017-09-11

## 2017-09-21 ENCOUNTER — OFFICE VISIT (OUTPATIENT)
Dept: URGENT CARE | Facility: CLINIC | Age: 27
End: 2017-09-21
Payer: COMMERCIAL

## 2017-09-21 VITALS
HEART RATE: 67 BPM | RESPIRATION RATE: 16 BRPM | TEMPERATURE: 99 F | WEIGHT: 135 LBS | BODY MASS INDEX: 23.92 KG/M2 | OXYGEN SATURATION: 97 % | DIASTOLIC BLOOD PRESSURE: 69 MMHG | SYSTOLIC BLOOD PRESSURE: 98 MMHG | HEIGHT: 63 IN

## 2017-09-21 DIAGNOSIS — R19.7 DIARRHEA, UNSPECIFIED TYPE: Primary | ICD-10-CM

## 2017-09-21 DIAGNOSIS — R35.0 URINARY FREQUENCY: ICD-10-CM

## 2017-09-21 LAB
B-HCG UR QL: NEGATIVE
BILIRUB UR QL STRIP: NEGATIVE
CTP QC/QA: YES
GLUCOSE UR QL STRIP: NEGATIVE
KETONES UR QL STRIP: NEGATIVE
LEUKOCYTE ESTERASE UR QL STRIP: NEGATIVE
PH, POC UA: 7
POC BLOOD, URINE: POSITIVE
POC NITRATES, URINE: NEGATIVE
PROT UR QL STRIP: NEGATIVE
SP GR UR STRIP: 1.01 (ref 1–1.03)
UROBILINOGEN UR STRIP-ACNC: ABNORMAL (ref 0.1–1.1)

## 2017-09-21 PROCEDURE — 3008F BODY MASS INDEX DOCD: CPT | Mod: S$GLB,,, | Performed by: EMERGENCY MEDICINE

## 2017-09-21 PROCEDURE — 81003 URINALYSIS AUTO W/O SCOPE: CPT | Mod: QW,S$GLB,, | Performed by: EMERGENCY MEDICINE

## 2017-09-21 PROCEDURE — 81025 URINE PREGNANCY TEST: CPT | Mod: QW,S$GLB,, | Performed by: EMERGENCY MEDICINE

## 2017-09-21 PROCEDURE — 99214 OFFICE O/P EST MOD 30 MIN: CPT | Mod: 25,S$GLB,, | Performed by: EMERGENCY MEDICINE

## 2017-09-21 RX ORDER — PROMETHAZINE HYDROCHLORIDE 25 MG/1
25 TABLET ORAL EVERY 4 HOURS PRN
Qty: 30 TABLET | Refills: 1 | Status: SHIPPED | OUTPATIENT
Start: 2017-09-21 | End: 2019-02-20

## 2017-09-21 RX ORDER — CIPROFLOXACIN 500 MG/1
500 TABLET ORAL 2 TIMES DAILY
Qty: 14 TABLET | Refills: 0 | Status: SHIPPED | OUTPATIENT
Start: 2017-09-21 | End: 2017-09-28

## 2017-09-21 NOTE — PROGRESS NOTES
"Subjective:       Patient ID: Terri Tavera is a 27 y.o. female.    Vitals:  height is 5' 3" (1.6 m) and weight is 61.2 kg (135 lb). Her oral temperature is 98.8 °F (37.1 °C). Her blood pressure is 98/69 and her pulse is 67. Her respiration is 16 and oxygen saturation is 97%.     Chief Complaint: Urinary Urgency and Diarrhea    Diarrhea    This is a new problem. The current episode started in the past 7 days. The problem occurs 2 to 4 times per day. The stool consistency is described as watery. The patient states that diarrhea does not awaken her from sleep. Pertinent negatives include no abdominal pain, chills, fever or vomiting. Nothing aggravates the symptoms. She has tried increased fluids for the symptoms. The treatment provided mild relief.     Review of Systems   Constitution: Negative for chills and fever.   Musculoskeletal: Negative for back pain.   Gastrointestinal: Positive for diarrhea. Negative for abdominal pain, nausea and vomiting.   Genitourinary: Positive for dysuria and urgency. Negative for genital sores, hematuria, missed menses and non-menstrual bleeding.       Objective:      Physical Exam   Constitutional: She is oriented to person, place, and time. She appears well-developed and well-nourished.   HENT:   Head: Normocephalic and atraumatic.   Right Ear: External ear normal.   Left Ear: External ear normal.   Nose: Nose normal.   Mouth/Throat: Mucous membranes are normal.   Eyes: Conjunctivae and lids are normal.   Neck: Trachea normal and full passive range of motion without pain. Neck supple.   Cardiovascular: Normal rate, regular rhythm and normal heart sounds.    Pulmonary/Chest: Effort normal and breath sounds normal. No respiratory distress.   Abdominal: Soft. Normal appearance and bowel sounds are normal. She exhibits no distension, no abdominal bruit, no pulsatile midline mass and no mass. There is no tenderness.   Musculoskeletal: Normal range of motion. She exhibits no edema. "   Neurological: She is alert and oriented to person, place, and time. She has normal strength.   Skin: Skin is warm, dry and intact. She is not diaphoretic. No pallor.   Psychiatric: She has a normal mood and affect. Her speech is normal and behavior is normal. Judgment and thought content normal. Cognition and memory are normal.   Nursing note and vitals reviewed.      Assessment:       1. Diarrhea, unspecified type    2. Urinary frequency        Plan:         Diarrhea, unspecified type    Urinary frequency  -     POCT Urinalysis, Dipstick, Automated, W/O Scope  -     POCT urine pregnancy  -     Urine culture    Other orders  -     ciprofloxacin HCl (CIPRO) 500 MG tablet; Take 1 tablet (500 mg total) by mouth 2 (two) times daily.  Dispense: 14 tablet; Refill: 0  -     promethazine (PHENERGAN) 25 MG tablet; Take 1 tablet (25 mg total) by mouth every 4 (four) hours as needed for Nausea.  Dispense: 30 tablet; Refill: 1      Go to the Emergency Room if symptoms or condition worsens in any way    Follow up with   Primary Care    in 5-7 days if not improved    Uncertain Causes of Diarrhea (Adult)    Diarrhea is when stools are loose and watery. This can be caused by:  · Viral infections  · Bacterial infections  · Food poisoning  · Parasites  · Irritable bowel syndrome (IBS)  · Inflammatory bowel diseases such as ulcerative colitis, Crohn's disease, and celiac disease  · Food intolerance, such as to lactose, the sugar found in milk and milk products  · Reaction to medicines like antibiotics, laxatives, cancer drugs, and antacids  Along with diarrhea, you may also have:  · Abdominal pain and cramping  · Nausea and vomiting  · Loss of bowel control  · Fever and chills  · Bloody stools  In some cases, antibiotics may help to treat diarrhea. You may have a stool sample test. This is done to see what is causing your diarrhea, and if antibiotics will help treat it. The results of a stool sample test may take up to 2 days. The  healthcare provider may not give you antibiotics until he or she has the stool test results.  Diarrhea can cause dehydration. This is the loss of too much water and other fluids from the body. When this occurs, body fluid must be replaced. This can be done with oral rehydration solutions. Oral rehydration solutions are available at drugstores and grocery stores without a prescription.  Home care  Follow all instructions given by your healthcare provider. Rest at home for the next 24 hours, or until you feel better. Avoid caffeine, tobacco, and alcohol. These can make diarrhea, cramping, and pain worse.  If taking medicines:  · Dont take over-the-counter diarrhea or nausea medicines unless your healthcare provider tells you to.  · You may use acetaminophen or NSAID medicines like ibuprofen or naproxen to reduce pain and fever. Dont use these if you have chronic liver or kidney disease, or ever had a stomach ulcer or gastrointestinal bleeding. Don't use NSAID medicines if you are already taking one for another condition (like arthritis) or are on daily aspirin therapy (such as for heart disease or after a stroke). Talk with your healthcare provider first.  · If antibiotics were prescribed, be sure you take them until they are finished. Dont stop taking them even when you feel better. Antibiotics must be taken as a full course.  To prevent the spread of illness:  · Remember that washing with soap and water and using alcohol-based  is the best way to prevent the spread of infection.  · Clean the toilet after each use.  · Wash your hands before eating.  · Wash your hands before and after preparing food. Keep in mind that people with diarrhea or vomiting should not prepare food for others.  · Wash your hands after using cutting boards, countertops, and knives that have been in contact with raw foods.  · Wash and then peel fruits and vegetables.  · Keep uncooked meats away from cooked and ready-to-eat  foods.  · Use a food thermometer when cooking. Cook poultry to at least 165°F (74°C). Cook ground meat (beef, veal, pork, lamb) to at least 160°F (71°C). Cook fresh beef, veal, lamb, and pork to at least 145°F (63°C).  · Dont eat raw or undercooked eggs (poached or kya side up), poultry, meat, or unpasteurized milk and juices.  Food and drinks  The main goal while treating vomiting or diarrhea is to prevent dehydration. This is done by taking small amounts of liquids often.  · Keep in mind that liquids are more important than food right now.  · Drink only small amounts of liquids at a time.  · Dont force yourself to eat, especially if you are having cramping, vomiting, or diarrhea. Dont eat large amounts at a time, even if you are hungry.  · If you eat, avoid fatty, greasy, spicy, or fried foods.  · Dont eat dairy foods or drink milk if you have diarrhea. These can make diarrhea worse.  During the first 24 hours you can try:  · Oral rehydration solutions. Do not use sports drinks. They have too much sugar and not enough electrolytes.  · Soft drinks without caffeine  · Ginger ale  · Water (plain or flavored)  · Decaf tea or coffee  · Clear broth, consommé, or bouillon  · Gelatin, popsicles, or frozen fruit juice bars  The second 24 hours, if you are feeling better, you can add:  · Hot cereal, plain toast, bread, rolls, or crackers  · Plain noodles, rice, mashed potatoes, chicken noodle soup, or rice soup  · Unsweetened canned fruit (no pineapple)  · Bananas  As you recover:  · Limit fat intake to less than 15 grams per day. Dont eat margarine, butter, oils, mayonnaise, sauces, gravies, fried foods, peanut butter, meat, poultry, or fish.  · Limit fiber. Dont eat raw or cooked vegetables, fresh fruits except bananas, or bran cereals.  · Limit caffeine and chocolate.  · Limit dairy.  · Dont use spices or seasonings except salt.  · Go back to your normal diet over time, as you feel better and your symptoms  improve.  · If the symptoms come back, go back to a simple diet or clear liquids.  Follow-up care  Follow up with your healthcare provider, or as advised. If a stool sample was taken or cultures were done, call the healthcare provider for the results as instructed.  Call 911  Call 911 if you have any of these symptoms:  · Trouble breathing  · Confusion  · Extreme drowsiness or trouble walking  · Loss of consciousness  · Rapid heart rate  · Chest pain  · Stiff neck  · Seizure  When to seek medical advice  Call your healthcare provider right away if any of these occur:  · Abdominal pain that gets worse  · Constant lower right abdominal pain  · Continued vomiting and inability to keep liquids down  · Diarrhea more than 5 times a day  · Blood in vomit or stool  · Dark urine or no urine for 8 hours, dry mouth and tongue, tiredness, weakness, or dizziness  · Drowsiness  · New rash  · You dont get better in 2 to 3 days  · Fever of 100.4°F (38°C) or higher that doesnt get lower with medicine  Date Last Reviewed: 1/3/2016  © 8892-0510 [employer]. [address] [place] [address]. All rights reserved. This information is not intended as a substitute for professional medical care. Always follow your healthcare professional's instructions.        Diet for Vomiting or Diarrhea (Adult)    Your symptoms may return or get worse after eating certain foods listed below. If this happens, stop eating these foods until your symptoms ease and you feel better.  Once the vomiting stops, follow the steps below.   During the first 12 to 24 hours  During the first 12 to 24 hours, follow this diet:  · Drinks. Plain water, sport drinks like electrolyte solutions, soft drinks without caffeine, mineral water (plain or flavored), clear fruit juices, and decaffeinated tea and coffee.  · Soups. Clear broth.  · Desserts. Plain gelatin, popsicles, and fruit juice bars. As you feel better, you may add 6 to 8 ounces of yogurt  per day. If you have diarrhea, don't have foods or drinks that contain sugar, high-fructose corn syrup, or sugar alcohols.  During the next 24 hours  During the next 24 hours you may add the following to the above:  · Hot cereal, plain toast, bread, rolls, and crackers  · Plain noodles, rice, mashed potatoes, and chicken noodle or rice soup  · Unsweetened canned fruit (but not pineapple) and bananas  Don't eat more than 15 grams of fat a day. Do this by staying away from margarine, butter, oils, mayonnaise, sauces, gravies, fried foods, peanut butter, meat, poultry, and fish.  Don't eat much fiber. Stay away from raw or cooked vegetables, fresh fruits (except bananas), and bran cereals.  Limit how much caffeine and chocolate you have. Do not use any spices or seasonings except salt.  During the next 24 hours  Slowly go back to your normal diet, as you feel better and your symptoms ease.  Date Last Reviewed: 8/1/2016  © 5359-9047 [employer]. [address] [place] [address]. All rights reserved. This information is not intended as a substitute for professional medical care. Always follow your healthcare professional's instructions.        Dysuria     Painful urination (dysuria) is often caused by a problem in the urinary tract.   Dysuria is pain felt during urination. It is often described as a burning. Learn more about this problem and how it can be treated.  What causes dysuria?  Possible causes include:  · Infection with a bacteria or virus such as a urinary tract infection (UTI or a sexually transmitted infection (STI)  · Sensitivity or allergy to chemicals such as those found in lotions and other products  · Prostate or bladder problems  · Radiation therapy to the pelvic area  How is dysuria diagnosed?  Your healthcare provider will examine you. He or she will ask about your symptoms and health. After talking with you and doing a physical exam, your healthcare provider may know what is  "causing your dysuria. He or she will usually request  a sample of your urine. Tests of your urine, or a "urinalysis," are done. A urinalysis may include:  · Looking at the urine sample (visual exam)  · Checking for substances (chemical exam)  · Looking at a small amount under a microscope (microscopic exam)  Some parts of the urinalysis may be done in the provider's office and some in a lab. And, the urine sample may be checked for bacteria and yeast (urine culture). Your healthcare provider will tell you more about these tests if they are needed.  How is dysuria treated?  Treatment depends on the cause. If you have a bacterial infection, you may need antibiotics. You may be given medicines to make it easier for you to urinate and help relieve pain. Your healthcare provider can tell you more about your treatment options. Untreated, symptoms may get worse.  When to call your healthcare provider  Call the healthcare provider right away if you have any of the following:  · Fever of 100.4°F (38°C) or higher   · No improvement after three days of treatment  · Trouble urinating because of pain  · New or increased discharge from the vagina or penis  · Rash or joint pain  · Increased back or abdominal pain  · Enlarged painful lymph nodes (lumps) in the groin   Date Last Reviewed: 1/1/2017 © 2000-2017 [employer]. [address] [place] [id number], [place] [address]. All rights reserved. This information is not intended as a substitute for professional medical care. Always follow your healthcare professional's instructions.              "

## 2017-09-21 NOTE — PATIENT INSTRUCTIONS
Go to the Emergency Room if symptoms or condition worsens in any way    Follow up with   Primary Care    in 5-7 days if not improved    Uncertain Causes of Diarrhea (Adult)    Diarrhea is when stools are loose and watery. This can be caused by:  · Viral infections  · Bacterial infections  · Food poisoning  · Parasites  · Irritable bowel syndrome (IBS)  · Inflammatory bowel diseases such as ulcerative colitis, Crohn's disease, and celiac disease  · Food intolerance, such as to lactose, the sugar found in milk and milk products  · Reaction to medicines like antibiotics, laxatives, cancer drugs, and antacids  Along with diarrhea, you may also have:  · Abdominal pain and cramping  · Nausea and vomiting  · Loss of bowel control  · Fever and chills  · Bloody stools  In some cases, antibiotics may help to treat diarrhea. You may have a stool sample test. This is done to see what is causing your diarrhea, and if antibiotics will help treat it. The results of a stool sample test may take up to 2 days. The healthcare provider may not give you antibiotics until he or she has the stool test results.  Diarrhea can cause dehydration. This is the loss of too much water and other fluids from the body. When this occurs, body fluid must be replaced. This can be done with oral rehydration solutions. Oral rehydration solutions are available at drugstores and grocery stores without a prescription.  Home care  Follow all instructions given by your healthcare provider. Rest at home for the next 24 hours, or until you feel better. Avoid caffeine, tobacco, and alcohol. These can make diarrhea, cramping, and pain worse.  If taking medicines:  · Dont take over-the-counter diarrhea or nausea medicines unless your healthcare provider tells you to.  · You may use acetaminophen or NSAID medicines like ibuprofen or naproxen to reduce pain and fever. Dont use these if you have chronic liver or kidney disease, or ever had a stomach ulcer or  gastrointestinal bleeding. Don't use NSAID medicines if you are already taking one for another condition (like arthritis) or are on daily aspirin therapy (such as for heart disease or after a stroke). Talk with your healthcare provider first.  · If antibiotics were prescribed, be sure you take them until they are finished. Dont stop taking them even when you feel better. Antibiotics must be taken as a full course.  To prevent the spread of illness:  · Remember that washing with soap and water and using alcohol-based  is the best way to prevent the spread of infection.  · Clean the toilet after each use.  · Wash your hands before eating.  · Wash your hands before and after preparing food. Keep in mind that people with diarrhea or vomiting should not prepare food for others.  · Wash your hands after using cutting boards, countertops, and knives that have been in contact with raw foods.  · Wash and then peel fruits and vegetables.  · Keep uncooked meats away from cooked and ready-to-eat foods.  · Use a food thermometer when cooking. Cook poultry to at least 165°F (74°C). Cook ground meat (beef, veal, pork, lamb) to at least 160°F (71°C). Cook fresh beef, veal, lamb, and pork to at least 145°F (63°C).  · Dont eat raw or undercooked eggs (poached or kya side up), poultry, meat, or unpasteurized milk and juices.  Food and drinks  The main goal while treating vomiting or diarrhea is to prevent dehydration. This is done by taking small amounts of liquids often.  · Keep in mind that liquids are more important than food right now.  · Drink only small amounts of liquids at a time.  · Dont force yourself to eat, especially if you are having cramping, vomiting, or diarrhea. Dont eat large amounts at a time, even if you are hungry.  · If you eat, avoid fatty, greasy, spicy, or fried foods.  · Dont eat dairy foods or drink milk if you have diarrhea. These can make diarrhea worse.  During the first 24 hours you can  try:  · Oral rehydration solutions. Do not use sports drinks. They have too much sugar and not enough electrolytes.  · Soft drinks without caffeine  · Ginger ale  · Water (plain or flavored)  · Decaf tea or coffee  · Clear broth, consommé, or bouillon  · Gelatin, popsicles, or frozen fruit juice bars  The second 24 hours, if you are feeling better, you can add:  · Hot cereal, plain toast, bread, rolls, or crackers  · Plain noodles, rice, mashed potatoes, chicken noodle soup, or rice soup  · Unsweetened canned fruit (no pineapple)  · Bananas  As you recover:  · Limit fat intake to less than 15 grams per day. Dont eat margarine, butter, oils, mayonnaise, sauces, gravies, fried foods, peanut butter, meat, poultry, or fish.  · Limit fiber. Dont eat raw or cooked vegetables, fresh fruits except bananas, or bran cereals.  · Limit caffeine and chocolate.  · Limit dairy.  · Dont use spices or seasonings except salt.  · Go back to your normal diet over time, as you feel better and your symptoms improve.  · If the symptoms come back, go back to a simple diet or clear liquids.  Follow-up care  Follow up with your healthcare provider, or as advised. If a stool sample was taken or cultures were done, call the healthcare provider for the results as instructed.  Call 911  Call 911 if you have any of these symptoms:  · Trouble breathing  · Confusion  · Extreme drowsiness or trouble walking  · Loss of consciousness  · Rapid heart rate  · Chest pain  · Stiff neck  · Seizure  When to seek medical advice  Call your healthcare provider right away if any of these occur:  · Abdominal pain that gets worse  · Constant lower right abdominal pain  · Continued vomiting and inability to keep liquids down  · Diarrhea more than 5 times a day  · Blood in vomit or stool  · Dark urine or no urine for 8 hours, dry mouth and tongue, tiredness, weakness, or dizziness  · Drowsiness  · New rash  · You dont get better in 2 to 3 days  · Fever of  100.4°F (38°C) or higher that doesnt get lower with medicine  Date Last Reviewed: 1/3/2016  © 5893-4391 [employer]. [address] [place] [address], [place] [address]. All rights reserved. This information is not intended as a substitute for professional medical care. Always follow your healthcare professional's instructions.        Diet for Vomiting or Diarrhea (Adult)    Your symptoms may return or get worse after eating certain foods listed below. If this happens, stop eating these foods until your symptoms ease and you feel better.  Once the vomiting stops, follow the steps below.   During the first 12 to 24 hours  During the first 12 to 24 hours, follow this diet:  · Drinks. Plain water, sport drinks like electrolyte solutions, soft drinks without caffeine, mineral water (plain or flavored), clear fruit juices, and decaffeinated tea and coffee.  · Soups. Clear broth.  · Desserts. Plain gelatin, popsicles, and fruit juice bars. As you feel better, you may add 6 to 8 ounces of yogurt per day. If you have diarrhea, don't have foods or drinks that contain sugar, high-fructose corn syrup, or sugar alcohols.  During the next 24 hours  During the next 24 hours you may add the following to the above:  · Hot cereal, plain toast, bread, rolls, and crackers  · Plain noodles, rice, mashed potatoes, and chicken noodle or rice soup  · Unsweetened canned fruit (but not pineapple) and bananas  Don't eat more than 15 grams of fat a day. Do this by staying away from margarine, butter, oils, mayonnaise, sauces, gravies, fried foods, peanut butter, meat, poultry, and fish.  Don't eat much fiber. Stay away from raw or cooked vegetables, fresh fruits (except bananas), and bran cereals.  Limit how much caffeine and chocolate you have. Do not use any spices or seasonings except salt.  During the next 24 hours  Slowly go back to your normal diet, as you feel better and your symptoms ease.  Date Last Reviewed: 8/1/2016  ©  "0202-9614 [employer]. [address] [place] [address], [place] [address]. All rights reserved. This information is not intended as a substitute for professional medical care. Always follow your healthcare professional's instructions.        Dysuria     Painful urination (dysuria) is often caused by a problem in the urinary tract.   Dysuria is pain felt during urination. It is often described as a burning. Learn more about this problem and how it can be treated.  What causes dysuria?  Possible causes include:  · Infection with a bacteria or virus such as a urinary tract infection (UTI or a sexually transmitted infection (STI)  · Sensitivity or allergy to chemicals such as those found in lotions and other products  · Prostate or bladder problems  · Radiation therapy to the pelvic area  How is dysuria diagnosed?  Your healthcare provider will examine you. He or she will ask about your symptoms and health. After talking with you and doing a physical exam, your healthcare provider may know what is causing your dysuria. He or she will usually request  a sample of your urine. Tests of your urine, or a "urinalysis," are done. A urinalysis may include:  · Looking at the urine sample (visual exam)  · Checking for substances (chemical exam)  · Looking at a small amount under a microscope (microscopic exam)  Some parts of the urinalysis may be done in the provider's office and some in a lab. And, the urine sample may be checked for bacteria and yeast (urine culture). Your healthcare provider will tell you more about these tests if they are needed.  How is dysuria treated?  Treatment depends on the cause. If you have a bacterial infection, you may need antibiotics. You may be given medicines to make it easier for you to urinate and help relieve pain. Your healthcare provider can tell you more about your treatment options. Untreated, symptoms may get worse.  When to call your healthcare provider  Call the healthcare " provider right away if you have any of the following:  · Fever of 100.4°F (38°C) or higher   · No improvement after three days of treatment  · Trouble urinating because of pain  · New or increased discharge from the vagina or penis  · Rash or joint pain  · Increased back or abdominal pain  · Enlarged painful lymph nodes (lumps) in the groin   Date Last Reviewed: 1/1/2017  © 6344-1399 [employer]. [address] [place] [address], [place] [address]. All rights reserved. This information is not intended as a substitute for professional medical care. Always follow your healthcare professional's instructions.

## 2017-09-25 ENCOUNTER — TELEPHONE (OUTPATIENT)
Dept: URGENT CARE | Facility: CLINIC | Age: 27
End: 2017-09-25

## 2017-09-25 LAB
BACTERIA UR CULT: ABNORMAL
BACTERIA UR CULT: ABNORMAL
OTHER ANTIBIOTIC SUSC ISLT: ABNORMAL

## 2017-10-07 ENCOUNTER — OFFICE VISIT (OUTPATIENT)
Dept: INTERNAL MEDICINE | Facility: CLINIC | Age: 27
End: 2017-10-07
Payer: COMMERCIAL

## 2017-10-07 VITALS
OXYGEN SATURATION: 99 % | BODY MASS INDEX: 24.02 KG/M2 | TEMPERATURE: 99 F | WEIGHT: 135.56 LBS | HEART RATE: 93 BPM | SYSTOLIC BLOOD PRESSURE: 116 MMHG | DIASTOLIC BLOOD PRESSURE: 62 MMHG | HEIGHT: 63 IN

## 2017-10-07 DIAGNOSIS — M75.51 ACUTE BURSITIS OF RIGHT SHOULDER: Primary | ICD-10-CM

## 2017-10-07 PROCEDURE — 99999 PR PBB SHADOW E&M-EST. PATIENT-LVL IV: CPT | Mod: PBBFAC,,, | Performed by: INTERNAL MEDICINE

## 2017-10-07 PROCEDURE — 99214 OFFICE O/P EST MOD 30 MIN: CPT | Mod: 25,S$GLB,, | Performed by: INTERNAL MEDICINE

## 2017-10-07 PROCEDURE — 96372 THER/PROPH/DIAG INJ SC/IM: CPT | Mod: S$GLB,,, | Performed by: INTERNAL MEDICINE

## 2017-10-07 RX ORDER — NAPROXEN 500 MG/1
500 TABLET ORAL 2 TIMES DAILY
Qty: 60 TABLET | Refills: 1 | Status: SHIPPED | OUTPATIENT
Start: 2017-10-07 | End: 2019-02-20

## 2017-10-07 RX ORDER — METHYLPREDNISOLONE 4 MG/1
TABLET ORAL
Qty: 21 TABLET | Refills: 0 | Status: SHIPPED | OUTPATIENT
Start: 2017-10-07 | End: 2019-02-20

## 2017-10-07 RX ORDER — HYDROCODONE BITARTRATE AND ACETAMINOPHEN 7.5; 325 MG/1; MG/1
1 TABLET ORAL EVERY 8 HOURS PRN
Qty: 21 TABLET | Refills: 0 | Status: SHIPPED | OUTPATIENT
Start: 2017-10-07 | End: 2019-02-20

## 2017-10-07 RX ORDER — KETOROLAC TROMETHAMINE 30 MG/ML
60 INJECTION, SOLUTION INTRAMUSCULAR; INTRAVENOUS
Status: COMPLETED | OUTPATIENT
Start: 2017-10-07 | End: 2017-10-07

## 2017-10-07 RX ORDER — TRIAMCINOLONE ACETONIDE 40 MG/ML
40 INJECTION, SUSPENSION INTRA-ARTICULAR; INTRAMUSCULAR
Status: COMPLETED | OUTPATIENT
Start: 2017-10-07 | End: 2017-10-07

## 2017-10-07 RX ADMIN — KETOROLAC TROMETHAMINE 60 MG: 30 INJECTION, SOLUTION INTRAMUSCULAR; INTRAVENOUS at 02:10

## 2017-10-07 RX ADMIN — TRIAMCINOLONE ACETONIDE 40 MG: 40 INJECTION, SUSPENSION INTRA-ARTICULAR; INTRAMUSCULAR at 02:10

## 2017-10-07 NOTE — PROGRESS NOTES
Subjective:       Patient ID: Terri Tavera is a 27 y.o. female.    Chief Complaint: Shoulder Pain    HPI     27-year-old female here for evaluation of right shoulder pain.  This pain started Wednesday.  She has taken Advil, Norco so far for it.  She is an OT in the acute setting.  She started with pain Wednesday in the anterior aspect of her shoulder/.  No clicking.  Thursday morning, she woke up feeling ok.  THen, she was fooling with the sheets in the patient's room and her arm became very painful and her arm has been very limited.  She has limited ROM.      Review of Systems    Objective:      Physical Exam   Constitutional: She is oriented to person, place, and time. She appears well-developed and well-nourished.   HENT:   Head: Normocephalic and atraumatic.   Mouth/Throat: No oropharyngeal exudate.   Eyes: EOM are normal. Pupils are equal, round, and reactive to light. Right eye exhibits no discharge. Left eye exhibits no discharge. No scleral icterus.   Neck: Normal range of motion. Neck supple. No tracheal deviation present. No thyromegaly present.   Cardiovascular: Normal rate, regular rhythm and normal heart sounds.  Exam reveals no gallop and no friction rub.    No murmur heard.  Pulmonary/Chest: Effort normal and breath sounds normal. No respiratory distress. She has no wheezes. She has no rales. She exhibits no tenderness.   Abdominal: Soft. Bowel sounds are normal. She exhibits no distension and no mass. There is no tenderness. There is no rebound and no guarding.   Musculoskeletal: She exhibits no edema or tenderness.        Right shoulder: She exhibits decreased range of motion.        Left shoulder: Normal.   Neurological: She is alert and oriented to person, place, and time.   Skin: Skin is warm and dry. No rash noted. No erythema. No pallor.   Psychiatric: She has a normal mood and affect. Her behavior is normal.   Vitals reviewed.      Assessment:       1. Acute bursitis of right shoulder         Plan:       1.  Kenalog 40 mrem IM ×1, Medrol Dosepak.  Toradol 60 mrem IM ×1, naproxen 500 mg twice a day.  Refer to orthopedics.

## 2017-10-17 ENCOUNTER — OFFICE VISIT (OUTPATIENT)
Dept: ORTHOPEDICS | Facility: CLINIC | Age: 27
End: 2017-10-17
Payer: COMMERCIAL

## 2017-10-17 VITALS — HEIGHT: 63 IN | WEIGHT: 136.88 LBS | BODY MASS INDEX: 24.25 KG/M2

## 2017-10-17 DIAGNOSIS — M77.8 RIGHT SHOULDER TENDINITIS: Primary | ICD-10-CM

## 2017-10-17 PROCEDURE — 20610 DRAIN/INJ JOINT/BURSA W/O US: CPT | Mod: RT,S$GLB,, | Performed by: NURSE PRACTITIONER

## 2017-10-17 PROCEDURE — 99203 OFFICE O/P NEW LOW 30 MIN: CPT | Mod: 25,S$GLB,, | Performed by: NURSE PRACTITIONER

## 2017-10-17 PROCEDURE — 99999 PR PBB SHADOW E&M-EST. PATIENT-LVL III: CPT | Mod: PBBFAC,,, | Performed by: NURSE PRACTITIONER

## 2017-10-17 RX ORDER — TRIAMCINOLONE ACETONIDE 40 MG/ML
40 INJECTION, SUSPENSION INTRA-ARTICULAR; INTRAMUSCULAR
Status: COMPLETED | OUTPATIENT
Start: 2017-10-17 | End: 2017-10-17

## 2017-10-17 RX ADMIN — TRIAMCINOLONE ACETONIDE 40 MG: 40 INJECTION, SUSPENSION INTRA-ARTICULAR; INTRAMUSCULAR at 07:10

## 2017-10-17 NOTE — PROGRESS NOTES
CC: Pain of the Right Shoulder      HPI: Pt with right shoulder pain  for the past week or two. The pain started suddenly and there was not an injury. She was seen in urgent care and had a kenalog IM injection, toradol IM injection, and a medrol dose pack. She is also taking naprosyn twice a day. The severe pain resolved, but she is still unable to lay on the right arm and she has sharp pains when she reaches for things or reaches behind her. 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 She has full range of motion and her pain is intermittent at this point.    ROS  General: denies fever and chills  Resp: no c/o sob  CVS: no c/o cp  MSK: c/o right shoulder pain which occurs with reaching and when laying on the shoulder    PE  General: AAOx3, pleasant and cooperative  Resp: respirations even and unlabored  MSK: right shoulder exam  - [place]    Assessment:  Right shoulder tendinitis    Plan:  Cortisone injection right shoulder tonight for pain relief  Continue naprosyn  F/u as needed    Shoulder Injection Procedure Note    Pre-operative Diagnosis: right shoulder pain    Post-operative Diagnosis: same    Indications: right shoulder pain    Anesthesia: none    Procedure Details     Verbal consent was obtained for the procedure. The injection site was identified and the skin was prepared with alcohol. The right shoulder was injected from a posterior approach with 1 ml of Kenalog and 5 ml Lidocaine under sterile technique using a 22 gauge 1 1/2 inch needle. The needle was removed and the area cleansed and dressed.    Complications:  None; patient tolerated the procedure well.    she was advised to rest the shoulder today, using ice as needed for comfort and swelling. she did receive immediate relief of the shoulder pain. she was told this would be short lived and is secondary to the lidocaine. she may have an increase in discomfort tonight followed by  steady improvement over the next several days. It may take 1-3 weeks following the injection to get the full benefit of the medication.

## 2017-10-17 NOTE — LETTER
October 17, 2017      Oni Garcia MD  [address] [facility]  [place] [address]           [facility]  [address]  [place] [address]  Phone: [phone]  Fax: [phone]          Patient: Terri Tavera   MR Number: [id number]   YOB: 1990   Date of Visit: 10/17/2017       Dear Dr. Oni Garcia:    Thank you for referring Terri Tavera to me for evaluation. Attached you will find relevant portions of my assessment and plan of care.    If you have questions, please do not hesitate to call me. I look forward to following Terri Tavera along with you.    Sincerely,    Yesi Solitario NP    Enclosure  CC:  No Recipients    If you would like to receive this communication electronically, please contact [email or web address] or [phone] to request more information on [employer] access.    For providers and/or their staff who would like to refer a patient to [facility], please contact us through our one-stop-shop provider referral line, Rommel Do, at [phone].    If you feel you have received this communication in error or would no longer like to receive these types of communications, please e-mail [email or web address]

## 2017-11-29 ENCOUNTER — OFFICE VISIT (OUTPATIENT)
Dept: URGENT CARE | Facility: CLINIC | Age: 27
End: 2017-11-29
Payer: COMMERCIAL

## 2017-11-29 VITALS
HEIGHT: 63 IN | DIASTOLIC BLOOD PRESSURE: 72 MMHG | RESPIRATION RATE: 18 BRPM | OXYGEN SATURATION: 98 % | WEIGHT: 136 LBS | BODY MASS INDEX: 24.1 KG/M2 | TEMPERATURE: 97 F | HEART RATE: 87 BPM | SYSTOLIC BLOOD PRESSURE: 106 MMHG

## 2017-11-29 DIAGNOSIS — J06.9 VIRAL URI WITH COUGH: Primary | ICD-10-CM

## 2017-11-29 PROCEDURE — 99214 OFFICE O/P EST MOD 30 MIN: CPT | Mod: S$GLB,,, | Performed by: EMERGENCY MEDICINE

## 2017-11-29 RX ORDER — VALACYCLOVIR HYDROCHLORIDE 1 G/1
TABLET, FILM COATED ORAL
COMMUNITY
Start: 2017-11-27 | End: 2019-02-20

## 2017-11-29 RX ORDER — PREDNISONE 20 MG/1
20 TABLET ORAL DAILY
Qty: 3 TABLET | Refills: 0 | Status: SHIPPED | OUTPATIENT
Start: 2017-11-29 | End: 2017-12-02

## 2017-11-29 RX ORDER — HYDROCODONE POLISTIREX AND CHLORPHENIRAMINE POLISTIREX 10; 8 MG/5ML; MG/5ML
5 SUSPENSION, EXTENDED RELEASE ORAL EVERY 12 HOURS PRN
Qty: 100 ML | Refills: 0 | Status: SHIPPED | OUTPATIENT
Start: 2017-11-29 | End: 2018-11-29

## 2017-11-29 RX ORDER — ALPRAZOLAM 0.25 MG/1
TABLET ORAL
COMMUNITY
Start: 2017-11-20 | End: 2022-04-18

## 2017-11-29 NOTE — PROGRESS NOTES
Subjective:       Patient ID: Terri Tavera is a 27 y.o. female.        Chief Complaint: Cough (started today ) and Nasal Congestion (6 days )    Cough   This is a new problem. The current episode started in the past 7 days. The problem has been gradually worsening. The problem occurs every few hours. The cough is non-productive. Pertinent negatives include no chest pain, chills, ear pain, eye redness, fever, headaches, myalgias, sore throat, shortness of breath or wheezing. Nothing aggravates the symptoms. She has tried OTC cough suppressant (otc cold meds ) for the symptoms. The treatment provided mild relief. There is no history of asthma, bronchitis or pneumonia.     Review of Systems   Constitution: Positive for malaise/fatigue. Negative for chills and fever.   HENT: Positive for congestion (nasal ). Negative for ear pain, hoarse voice and sore throat.    Eyes: Negative for discharge and redness.   Cardiovascular: Negative for chest pain, dyspnea on exertion and leg swelling.   Respiratory: Positive for cough. Negative for shortness of breath, sputum production and wheezing.    Musculoskeletal: Negative for myalgias.   Gastrointestinal: Negative for abdominal pain and nausea.   Neurological: Negative for headaches.       Objective:      Physical Exam   Constitutional: She is oriented to person, place, and time. She appears well-developed and well-nourished. She is cooperative.  Non-toxic appearance. She does not appear ill. No distress.   Non productive cough present through out the exam     HENT:   Head: Normocephalic and atraumatic.   Right Ear: Hearing, tympanic membrane, external ear and ear canal normal.   Left Ear: Hearing, tympanic membrane, external ear and ear canal normal.   Nose: Rhinorrhea present. No mucosal edema or nasal deformity. No epistaxis. Right sinus exhibits no maxillary sinus tenderness and no frontal sinus tenderness. Left sinus exhibits no maxillary sinus tenderness and no frontal  sinus tenderness.   Mouth/Throat: Uvula is midline, oropharynx is clear and moist and mucous membranes are normal. No trismus in the jaw. Normal dentition. No uvula swelling. No posterior oropharyngeal erythema.   Eyes: Conjunctivae and lids are normal. No scleral icterus.   Sclera clear bilat   Neck: Trachea normal, full passive range of motion without pain and phonation normal. Neck supple.   Cardiovascular: Normal rate, regular rhythm, normal heart sounds, intact distal pulses and normal pulses.    Pulmonary/Chest: Effort normal and breath sounds normal. No respiratory distress.   Abdominal: Soft. Normal appearance and bowel sounds are normal. She exhibits no distension. There is no tenderness.   Musculoskeletal: Normal range of motion. She exhibits no edema or deformity.   Neurological: She is alert and oriented to person, place, and time. She exhibits normal muscle tone. Coordination normal.   Skin: Skin is warm, dry and intact. She is not diaphoretic. No pallor.   Psychiatric: She has a normal mood and affect. Her speech is normal and behavior is normal. Judgment and thought content normal. Cognition and memory are normal.   Nursing note and vitals reviewed.      Assessment:       1. Viral URI with cough        Plan:       Terri was seen today for cough and nasal congestion.    Diagnoses and all orders for this visit:    Viral URI with cough  -     predniSONE (DELTASONE) 20 MG tablet; Take 1 tablet (20 mg total) by mouth once daily.  -     hydrocodone-chlorpheniramine (TUSSIONEX) 10-8 mg/5 mL suspension; Take 5 mLs by mouth every 12 (twelve) hours as needed for Cough.

## 2017-11-30 NOTE — PATIENT INSTRUCTIONS
Please drink plenty of fluids.  Please get plenty of rest.  Please return here or go to the Emergency Department for any concerns or worsening of condition.  If you were prescribed antibiotics, please take them to completion.  If you were given wait & see antibiotics, please wait 5-7 days before taking them, and only take them if your symptoms have worsened or not improved.  If you do begin taking the antibiotics, please take them to completion.  If you were prescribed a narcotic medication, do not drive or operate heavy equipment or machinery while taking these medications.  If you were given a steroid shot in the clinic and have also been given a prescription for a steroid such as Prednisone or a Medrol Dose Pack, please begin taking them tomorrow.  If you do not have Hypertension or any history of palpitations, it is ok to take over the counter Sudafed or Mucinex D or Allegra-D or Claritin-D or Zyrtec-D.  If you do take one of the above, it is ok to combine that with plain over the counter Mucinex or Allegra or Claritin or Zyrtec.  If for example you are taking Zyrtec -D, you can combine that with Mucinex, but not Mucinex-D.  If you are taking Mucinex-D, you can combine that with plain Allegra or Claritin or Zyrtec.   If you do have Hypertension or palpitations, it is safe to take Coricidin HBP for relief of sinus symptoms.  If not allergic, please take over the counter Tylenol (Acetaminophen) and/or Motrin (Ibuprofen) as directed for control of pain and/or fever.  Please follow up with your primary care doctor or specialist as needed.    If you  smoke, please stop smoking.      Viral Upper Respiratory Illness (Adult)  You have a viral upper respiratory illness (URI), which is another term for the common cold. This illness is contagious during the first few days. It is spread through the air by coughing and sneezing. It may also be spread by direct contact (touching the sick person and then touching your own  eyes, nose, or mouth). Frequent handwashing will decrease risk of spread. Most viral illnesses go away within 7 to 10 days with rest and simple home remedies. Sometimes the illness may last for several weeks. Antibiotics will not kill a virus, and they are generally not prescribed for this condition.    Home care  · If symptoms are severe, rest at home for the first 2 to 3 days. When you resume activity, don't let yourself get too tired.  · Avoid being exposed to cigarette smoke (yours or others).  · You may use acetaminophen or ibuprofen to control pain and fever, unless another medicine was prescribed. (Note: If you have chronic liver or kidney disease, have ever had a stomach ulcer or gastrointestinal bleeding, or are taking blood-thinning medicines, talk with your healthcare provider before using these medicines.) Aspirin should never be given to anyone under 18 years of age who is ill with a viral infection or fever. It may cause severe liver or brain damage.  · Your appetite may be poor, so a light diet is fine. Avoid dehydration by drinking 6 to 8 glasses of fluids per day (water, soft drinks, juices, tea, or soup). Extra fluids will help loosen secretions in the nose and lungs.  · Over-the-counter cold medicines will not shorten the length of time youre sick, but they may be helpful for the following symptoms: cough, sore throat, and nasal and sinus congestion. (Note: Do not use decongestants if you have high blood pressure.)  Follow-up care  Follow up with your healthcare provider, or as advised.  When to seek medical advice  Call your healthcare provider right away if any of these occur:  · Cough with lots of colored sputum (mucus)  · Severe headache; face, neck, or ear pain  · Difficulty swallowing due to throat pain  · Fever of 100.4°F (38°C)  Call 911, or get immediate medical care  Call emergency services right away if any of these occur:  · Chest pain, shortness of breath, wheezing, or difficulty  breathing  · Coughing up blood  · Inability to swallow due to throat pain  Date Last Reviewed: 9/13/2015  © 8417-6538 [employer]. [address] [place] [address], [place] [address]. All rights reserved. This information is not intended as a substitute for professional medical care. Always follow your healthcare professional's instructions.

## 2017-12-07 ENCOUNTER — WALK IN (OUTPATIENT)
Dept: URGENT CARE | Age: 27
End: 2017-12-07

## 2017-12-07 DIAGNOSIS — J06.9 URI WITH COUGH AND CONGESTION: Primary | ICD-10-CM

## 2017-12-07 PROCEDURE — 99213 OFFICE O/P EST LOW 20 MIN: CPT | Performed by: FAMILY MEDICINE

## 2017-12-07 RX ORDER — BENZONATATE 200 MG/1
200 CAPSULE ORAL 3 TIMES DAILY PRN
Qty: 20 CAPSULE | Refills: 0 | Status: SHIPPED | OUTPATIENT
Start: 2017-12-07 | End: 2018-03-16 | Stop reason: ALTCHOICE

## 2017-12-07 RX ORDER — FEXOFENADINE HCL AND PSEUDOEPHEDRINE HCI 60; 120 MG/1; MG/1
1 TABLET, EXTENDED RELEASE ORAL 2 TIMES DAILY
Qty: 20 TABLET | Refills: 0 | Status: SHIPPED | OUTPATIENT
Start: 2017-12-07 | End: 2018-03-23 | Stop reason: ALTCHOICE

## 2017-12-07 ASSESSMENT — ENCOUNTER SYMPTOMS
SHORTNESS OF BREATH: 0
EYE DISCHARGE: 0
BACK PAIN: 0
LIGHT-HEADEDNESS: 0
HEADACHES: 0
FEVER: 0
COUGH: 1
DIZZINESS: 0
ACTIVITY CHANGE: 0
FACIAL ASYMMETRY: 0
ABDOMINAL PAIN: 0
WHEEZING: 0
APPETITE CHANGE: 0

## 2017-12-07 ASSESSMENT — PAIN SCALES - GENERAL: PAINLEVEL: 5-6

## 2018-03-07 ENCOUNTER — TELEPHONE (OUTPATIENT)
Dept: ORTHOPEDICS | Facility: CLINIC | Age: 28
End: 2018-03-07

## 2018-03-07 NOTE — TELEPHONE ENCOUNTER
----- Message from Arlene Garcia sent at 3/7/2018  3:51 PM CST -----  Contact: [phone]/self  Patient states she is returning your call. Please advise.

## 2018-03-08 ENCOUNTER — TELEPHONE (OUTPATIENT)
Dept: ORTHOPEDICS | Facility: CLINIC | Age: 28
End: 2018-03-08

## 2018-03-08 NOTE — TELEPHONE ENCOUNTER
I spoke with the patient and made her an appointment that worked for her. She was made aware of date, time and location.

## 2018-03-08 NOTE — TELEPHONE ENCOUNTER
----- Message from Kim Shaffer sent at 3/7/2018  5:17 PM CST -----  Contact: self / [phone]   Patient is returning a call from your office. Please advise

## 2018-03-12 ENCOUNTER — HOSPITAL ENCOUNTER (EMERGENCY)
Age: 28
Discharge: HOME | End: 2018-03-12
Attending: EMERGENCY MEDICINE

## 2018-03-12 ENCOUNTER — APPOINTMENT (OUTPATIENT)
Dept: CT IMAGING | Age: 28
End: 2018-03-12

## 2018-03-12 ENCOUNTER — WALK IN (OUTPATIENT)
Dept: URGENT CARE | Age: 28
End: 2018-03-12

## 2018-03-12 VITALS
RESPIRATION RATE: 16 BRPM | BODY MASS INDEX: 27.98 KG/M2 | SYSTOLIC BLOOD PRESSURE: 153 MMHG | OXYGEN SATURATION: 100 % | DIASTOLIC BLOOD PRESSURE: 93 MMHG | WEIGHT: 153 LBS | HEART RATE: 84 BPM | TEMPERATURE: 98 F

## 2018-03-12 VITALS
HEART RATE: 73 BPM | RESPIRATION RATE: 18 BRPM | TEMPERATURE: 98.6 F | WEIGHT: 150 LBS | SYSTOLIC BLOOD PRESSURE: 125 MMHG | BODY MASS INDEX: 27.6 KG/M2 | OXYGEN SATURATION: 100 % | HEIGHT: 62 IN | DIASTOLIC BLOOD PRESSURE: 75 MMHG

## 2018-03-12 DIAGNOSIS — R11.2 NAUSEA AND VOMITING IN ADULT: ICD-10-CM

## 2018-03-12 DIAGNOSIS — F07.81 POST CONCUSSIVE SYNDROME: Primary | ICD-10-CM

## 2018-03-12 DIAGNOSIS — S06.9X1A CLOSED HEAD INJURY WITH BRIEF LOSS OF CONSCIOUSNESS (CMD): Primary | ICD-10-CM

## 2018-03-12 LAB
ANION GAP BLD CALC-SCNC: 16 MMOL/L
BUN BLD-MCNC: 4 MG/DL (ref 6–20)
CA-I BLD ISE-SCNC: 1.14 MMOL/L (ref 1.15–1.29)
CHLORIDE BLD-SCNC: 102 MMOL/L (ref 98–107)
CO2 BLD-SCNC: 27 MMOL/L (ref 19–24)
CREAT BLD-MCNC: 0.6 MG/DL (ref 0.51–0.95)
CREAT BLD-MCNC: >90 MG/DL
GLUCOSE BLD-MCNC: 94 MG/DL (ref 65–99)
HCT VFR BLD CALC: 42 % (ref 36–46.5)
HGB BLD-MCNC: 14.3 G/DL (ref 12–15.5)
POTASSIUM BLD-SCNC: 3.3 MMOL/L (ref 3.4–5.1)
SODIUM BLD-SCNC: 140 MMOL/L (ref 135–145)

## 2018-03-12 PROCEDURE — 70450 CT HEAD/BRAIN W/O DYE: CPT | Performed by: RADIOLOGY

## 2018-03-12 PROCEDURE — 99214 OFFICE O/P EST MOD 30 MIN: CPT | Performed by: EMERGENCY MEDICINE

## 2018-03-12 PROCEDURE — 99284 EMERGENCY DEPT VISIT MOD MDM: CPT

## 2018-03-12 PROCEDURE — 36415 COLL VENOUS BLD VENIPUNCTURE: CPT

## 2018-03-12 PROCEDURE — 70450 CT HEAD/BRAIN W/O DYE: CPT

## 2018-03-12 PROCEDURE — 96360 HYDRATION IV INFUSION INIT: CPT

## 2018-03-12 PROCEDURE — 10002807 HB RX 258: Performed by: PHYSICIAN ASSISTANT

## 2018-03-12 PROCEDURE — 80047 BASIC METABLC PNL IONIZED CA: CPT

## 2018-03-12 PROCEDURE — 10004651 HB RX, NO CHARGE ITEM: Performed by: PHYSICIAN ASSISTANT

## 2018-03-12 RX ORDER — ONDANSETRON 4 MG/1
4 TABLET, ORALLY DISINTEGRATING ORAL EVERY 6 HOURS
Qty: 15 TABLET | Refills: 0 | Status: SHIPPED | OUTPATIENT
Start: 2018-03-12 | End: 2018-03-23 | Stop reason: ALTCHOICE

## 2018-03-12 RX ORDER — ONDANSETRON 4 MG/1
4 TABLET, ORALLY DISINTEGRATING ORAL ONCE
Status: COMPLETED | OUTPATIENT
Start: 2018-03-12 | End: 2018-03-12

## 2018-03-12 RX ORDER — ACETAMINOPHEN 325 MG/1
650 TABLET ORAL ONCE
Status: COMPLETED | OUTPATIENT
Start: 2018-03-12 | End: 2018-03-12

## 2018-03-12 RX ADMIN — ONDANSETRON 4 MG: 4 TABLET, ORALLY DISINTEGRATING ORAL at 13:45

## 2018-03-12 RX ADMIN — ACETAMINOPHEN 650 MG: 325 TABLET ORAL at 14:30

## 2018-03-12 RX ADMIN — SODIUM CHLORIDE 1000 ML: 9 INJECTION, SOLUTION INTRAVENOUS at 15:18

## 2018-03-12 ASSESSMENT — ENCOUNTER SYMPTOMS
CONSTIPATION: 0
CHILLS: 0
EYE DISCHARGE: 0
SHORTNESS OF BREATH: 0
WEAKNESS: 1
NUMBNESS: 0
EYE PAIN: 0
VOMITING: 1
BLOOD IN STOOL: 0
FEVER: 0
CHILLS: 0
FEVER: 0
HEADACHES: 1
ROS GI COMMENTS: DENIES HEMOPTYSIS.
LIGHT-HEADEDNESS: 1
ACTIVITY CHANGE: 0
DIZZINESS: 1
RHINORRHEA: 0
DIARRHEA: 1
ABDOMINAL PAIN: 0
DIAPHORESIS: 0
APPETITE CHANGE: 0
LIGHT-HEADEDNESS: 0
NAUSEA: 1
COUGH: 0
SORE THROAT: 0
FATIGUE: 0
WEAKNESS: 0
WOUND: 0
RHINORRHEA: 0
WHEEZING: 0
COUGH: 0
DIZZINESS: 0
HEADACHES: 1

## 2018-03-12 ASSESSMENT — PAIN SCALES - GENERAL
PAINLEVEL_OUTOF10: 8
PAINLEVEL_OUTOF10: 5
PAIN_LEVEL_AT_REST: 8

## 2018-03-12 ASSESSMENT — MOVEMENT AND STRENGTH ASSESSMENTS: FACE_JAW: NO FACIAL DROOP

## 2018-03-16 ENCOUNTER — OFFICE VISIT (OUTPATIENT)
Dept: INTERNAL MEDICINE | Age: 28
End: 2018-03-16

## 2018-03-16 VITALS
HEART RATE: 97 BPM | OXYGEN SATURATION: 100 % | DIASTOLIC BLOOD PRESSURE: 74 MMHG | BODY MASS INDEX: 28.63 KG/M2 | HEIGHT: 62 IN | SYSTOLIC BLOOD PRESSURE: 124 MMHG | WEIGHT: 155.6 LBS

## 2018-03-16 DIAGNOSIS — F07.81 POST CONCUSSION SYNDROME: Primary | ICD-10-CM

## 2018-03-16 PROCEDURE — 99214 OFFICE O/P EST MOD 30 MIN: CPT | Performed by: INTERNAL MEDICINE

## 2018-03-19 ENCOUNTER — OFFICE VISIT (OUTPATIENT)
Dept: URGENT CARE | Facility: CLINIC | Age: 28
End: 2018-03-19
Payer: COMMERCIAL

## 2018-03-19 VITALS
OXYGEN SATURATION: 99 % | BODY MASS INDEX: 24.1 KG/M2 | RESPIRATION RATE: 16 BRPM | WEIGHT: 136 LBS | HEIGHT: 63 IN | DIASTOLIC BLOOD PRESSURE: 71 MMHG | TEMPERATURE: 99 F | HEART RATE: 103 BPM | SYSTOLIC BLOOD PRESSURE: 104 MMHG

## 2018-03-19 DIAGNOSIS — J01.00 ACUTE MAXILLARY SINUSITIS, RECURRENCE NOT SPECIFIED: ICD-10-CM

## 2018-03-19 DIAGNOSIS — H66.002 ACUTE SUPPURATIVE OTITIS MEDIA OF LEFT EAR WITHOUT SPONTANEOUS RUPTURE OF TYMPANIC MEMBRANE, RECURRENCE NOT SPECIFIED: Primary | ICD-10-CM

## 2018-03-19 PROCEDURE — 99214 OFFICE O/P EST MOD 30 MIN: CPT | Mod: S$GLB,,, | Performed by: NURSE PRACTITIONER

## 2018-03-19 RX ORDER — FLUTICASONE PROPIONATE 50 MCG
2 SPRAY, SUSPENSION (ML) NASAL DAILY
Qty: 1 BOTTLE | Refills: 0 | Status: SHIPPED | OUTPATIENT
Start: 2018-03-19 | End: 2022-06-30

## 2018-03-19 RX ORDER — AMOXICILLIN AND CLAVULANATE POTASSIUM 875; 125 MG/1; MG/1
1 TABLET, FILM COATED ORAL 2 TIMES DAILY
Qty: 20 TABLET | Refills: 0 | Status: SHIPPED | OUTPATIENT
Start: 2018-03-19 | End: 2018-03-29

## 2018-03-19 NOTE — PATIENT INSTRUCTIONS
Zyrtec-D or Allegra-D   Flonase nasal spray   Augmentin until completion        Follow up with your doctor in a few days as needed.  Return to the urgent care or go to the ER if symptoms get worse.      Middle Ear Infection (Adult)  You have an infection of the middle ear, the space behind the eardrum. This is also called acute otitis media (AOM). Sometimes it is caused by the common cold. This is because congestion can block the internal passage (eustachian tube) that drains fluid from the middle ear. When the middle ear fills with fluid, bacteria can grow there and cause an infection. Oral antibiotics are used to treat this illness, not ear drops. Symptoms usually start to improve within 1 to 2 days of treatment.    Home care  The following are general care guidelines:  · Finish all of the antibiotic medicine given, even though you may feel better after the first few days.  · You may use over-the-counter medicine, such as acetaminophen or ibuprofen, to control pain and fever, unless something else was prescribed. If you have chronic liver or kidney disease or have ever had a stomach ulcer or gastrointestinal bleeding, talk with your healthcare provider before using these medicines. Do not give aspirin to anyone under 18 years of age who has a fever. It may cause severe illness or death.  Follow-up care  Follow up with your healthcare provider, or as advised, in 2 weeks if all symptoms have not gotten better, or if hearing doesn't go back to normal within 1 month.  When to seek medical advice  Call your healthcare provider right away if any of these occur:  · Ear pain gets worse or does not improve after 3 days of treatment  · Unusual drowsiness or confusion  · Neck pain, stiff neck, or headache  · Fluid or blood draining from the ear canal  · Fever of 100.4°F (38°C) or as advised   · Seizure  Date Last Reviewed: 6/1/2016  © 1190-5560 [employer]. [address] [place] [address], [place] [address]. All rights  reserved. This information is not intended as a substitute for professional medical care. Always follow your healthcare professional's instructions.        Acute Sinusitis    Acute sinusitis is irritation and swelling of the sinuses. It is usually caused by a viral infection after a common cold. Your doctor can help you find relief.  What is acute sinusitis?  Sinuses are air-filled spaces in the skull behind the face. They are kept moist and clean by a lining of mucosa. Things such as pollen, smoke, and chemical fumes can irritate the mucosa. It can then swell up. As a response to irritation, the mucosa makes more mucus and other fluids. Tiny hairlike cilia cover the mucosa. Cilia help carry mucus toward the opening of the sinus. Too much mucus may cause the cilia to stop working. This blocks the sinus opening. A buildup of fluid in the sinuses then causes pain and pressure. It can also encourage bacteria to grow in the sinuses.  Common symptoms of acute sinusitis  You may have:  · Facial soreness pain  · Headache  · Fever  · Fluid draining in the back of the throat (postnasal drip)  · Congestion  · Drainage that is thick and colored, instead of clear  · Cough  Diagnosing acute sinusitis  Your doctor will ask about your symptoms and health history. He or she will look at your ear, nose, and throat. You usually won't need to have X-rays taken.    The doctor may take a sample of mucus to check for bacteria. If you have sinusitis that keeps coming back, you may need imaging tests such as X-rays or CAT scans. This will help your doctor check for a structural problem that may be causing the infection.  Treating acute sinusitis  Treatment is aimed at unblocking the sinus opening and helping the cilia work again. You may need to take antihistamine and decongestant medicine. These can reduce inflammation and decrease the amount of fluid your sinuses make. If you have a bacterial infection, you will need to take antibiotic  medicine for 10 to 14 days. Take this medicine until it is gone, even if you feel better.  Date Last Reviewed: 10/1/2016  © 2996-1828 [employer]. [address] [place] [address], [place] [address]. All rights reserved. This information is not intended as a substitute for professional medical care. Always follow your healthcare professional's instructions.

## 2018-03-19 NOTE — PROGRESS NOTES
Subjective:       Patient ID: Terri Tavera is a 28 y.o. female.    Vitals:    03/19/18 1746   BP: 104/71   Pulse: 103   Resp: 16   Temp: 98.8 °F (37.1 °C)       Chief Complaint: Otalgia    Pt states dry cough x 3 days. Pt states left ear pain since yesterday. Reports left ear pain is severe. She has been on Mucinex DM for the past couple of days.   Denies fever.       Otalgia    There is pain in the left ear. This is a new problem. The current episode started yesterday. The problem has been unchanged. There has been no fever. The pain is at a severity of 6/10. The pain is moderate. Associated symptoms include coughing. Pertinent negatives include no abdominal pain, diarrhea, headaches, hearing loss, rash, sore throat or vomiting. Treatments tried: advil, mucinex D, Delsym. The treatment provided mild relief.     Review of Systems   Constitution: Negative for chills and fever.   HENT: Positive for congestion and ear pain. Negative for hearing loss, hoarse voice and sore throat.    Eyes: Negative for blurred vision.   Cardiovascular: Negative for chest pain.   Respiratory: Positive for cough. Negative for shortness of breath.    Skin: Negative for rash.   Musculoskeletal: Negative for back pain and joint pain.   Gastrointestinal: Negative for abdominal pain, diarrhea, nausea and vomiting.   Neurological: Negative for headaches.   Psychiatric/Behavioral: The patient is not nervous/anxious.        Objective:      Physical Exam   Constitutional: She is oriented to person, place, and time. She appears well-developed and well-nourished. She is cooperative.  Non-toxic appearance. She does not appear ill. No distress.   HENT:   Head: Normocephalic and atraumatic.   Right Ear: Hearing, tympanic membrane, external ear and ear canal normal.   Left Ear: Hearing, external ear and ear canal normal. Tympanic membrane is injected, erythematous and bulging. A middle ear effusion is present.   Nose: Mucosal edema (mild) and  rhinorrhea present. No nasal deformity. No epistaxis. Right sinus exhibits maxillary sinus tenderness (mild). Right sinus exhibits no frontal sinus tenderness. Left sinus exhibits maxillary sinus tenderness (mild). Left sinus exhibits no frontal sinus tenderness.   Mouth/Throat: Uvula is midline and mucous membranes are normal. No trismus in the jaw. Normal dentition. No uvula swelling. Oropharyngeal exudate, posterior oropharyngeal edema and posterior oropharyngeal erythema present.   PND    Eyes: Conjunctivae and lids are normal. Pupils are equal, round, and reactive to light. No scleral icterus.   Sclera clear bilat   Neck: Trachea normal, normal range of motion, full passive range of motion without pain and phonation normal. Neck supple.   Cardiovascular: Normal rate, regular rhythm, normal heart sounds, intact distal pulses and normal pulses.    Pulmonary/Chest: Effort normal and breath sounds normal. No respiratory distress.   Abdominal: Soft. Normal appearance and bowel sounds are normal. She exhibits no distension. There is no tenderness.   Musculoskeletal: Normal range of motion. She exhibits no edema or deformity.   Lymphadenopathy:     She has cervical adenopathy.        Right cervical: Superficial cervical adenopathy present.        Left cervical: Superficial cervical adenopathy present.   Neurological: She is alert and oriented to person, place, and time. She exhibits normal muscle tone. Coordination normal.   Skin: Skin is warm, dry and intact. She is not diaphoretic. No pallor.   Psychiatric: She has a normal mood and affect. Her speech is normal and behavior is normal. Judgment and thought content normal. Cognition and memory are normal.   Nursing note and vitals reviewed.      Assessment:       1. Acute suppurative otitis media of left ear without spontaneous rupture of tympanic membrane, recurrence not specified    2. Acute maxillary sinusitis, recurrence not specified        Plan:       Terri helton  seen today for otalgia.    Diagnoses and all orders for this visit:    Acute suppurative otitis media of left ear without spontaneous rupture of tympanic membrane, recurrence not specified  -     amoxicillin-clavulanate 875-125mg (AUGMENTIN) 875-125 mg per tablet; Take 1 tablet by mouth 2 (two) times daily.    Acute maxillary sinusitis, recurrence not specified  -     fluticasone (FLONASE) 50 mcg/actuation nasal spray; 2 sprays (100 mcg total) by Each Nare route once daily.          Patient Instructions   Zyrtec-D or Allegra-D   Flonase nasal spray   Augmentin until completion        Follow up with your doctor in a few days as needed.  Return to the urgent care or go to the ER if symptoms get worse.      Middle Ear Infection (Adult)  You have an infection of the middle ear, the space behind the eardrum. This is also called acute otitis media (AOM). Sometimes it is caused by the common cold. This is because congestion can block the internal passage (eustachian tube) that drains fluid from the middle ear. When the middle ear fills with fluid, bacteria can grow there and cause an infection. Oral antibiotics are used to treat this illness, not ear drops. Symptoms usually start to improve within 1 to 2 days of treatment.    Home care  The following are general care guidelines:  · Finish all of the antibiotic medicine given, even though you may feel better after the first few days.  · You may use over-the-counter medicine, such as acetaminophen or ibuprofen, to control pain and fever, unless something else was prescribed. If you have chronic liver or kidney disease or have ever had a stomach ulcer or gastrointestinal bleeding, talk with your healthcare provider before using these medicines. Do not give aspirin to anyone under 18 years of age who has a fever. It may cause severe illness or death.  Follow-up care  Follow up with your healthcare provider, or as advised, in 2 weeks if all symptoms have not gotten better, or  if hearing doesn't go back to normal within 1 month.  When to seek medical advice  Call your healthcare provider right away if any of these occur:  · Ear pain gets worse or does not improve after 3 days of treatment  · Unusual drowsiness or confusion  · Neck pain, stiff neck, or headache  · Fluid or blood draining from the ear canal  · Fever of 100.4°F (38°C) or as advised   · Seizure  Date Last Reviewed: 6/1/2016 © 2000-2017 [employer]. [address] [place] [address], [place] [address]. All rights reserved. This information is not intended as a substitute for professional medical care. Always follow your healthcare professional's instructions.        Acute Sinusitis    Acute sinusitis is irritation and swelling of the sinuses. It is usually caused by a viral infection after a common cold. Your doctor can help you find relief.  What is acute sinusitis?  Sinuses are air-filled spaces in the skull behind the face. They are kept moist and clean by a lining of mucosa. Things such as pollen, smoke, and chemical fumes can irritate the mucosa. It can then swell up. As a response to irritation, the mucosa makes more mucus and other fluids. Tiny hairlike cilia cover the mucosa. Cilia help carry mucus toward the opening of the sinus. Too much mucus may cause the cilia to stop working. This blocks the sinus opening. A buildup of fluid in the sinuses then causes pain and pressure. It can also encourage bacteria to grow in the sinuses.  Common symptoms of acute sinusitis  You may have:  · Facial soreness pain  · Headache  · Fever  · Fluid draining in the back of the throat (postnasal drip)  · Congestion  · Drainage that is thick and colored, instead of clear  · Cough  Diagnosing acute sinusitis  Your doctor will ask about your symptoms and health history. He or she will look at your ear, nose, and throat. You usually won't need to have X-rays taken.    The doctor may take a sample of mucus to check for bacteria. If you  have sinusitis that keeps coming back, you may need imaging tests such as X-rays or CAT scans. This will help your doctor check for a structural problem that may be causing the infection.  Treating acute sinusitis  Treatment is aimed at unblocking the sinus opening and helping the cilia work again. You may need to take antihistamine and decongestant medicine. These can reduce inflammation and decrease the amount of fluid your sinuses make. If you have a bacterial infection, you will need to take antibiotic medicine for 10 to 14 days. Take this medicine until it is gone, even if you feel better.  Date Last Reviewed: 10/1/2016  © 2026-3085 [employer]. [address] [place] [address], [place] [address]. All rights reserved. This information is not intended as a substitute for professional medical care. Always follow your healthcare professional's instructions.

## 2018-03-22 ENCOUNTER — HOSPITAL ENCOUNTER (OUTPATIENT)
Dept: RADIOLOGY | Facility: HOSPITAL | Age: 28
Discharge: HOME | End: 2018-03-22
Attending: ORTHOPAEDIC SURGERY
Payer: COMMERCIAL

## 2018-03-22 ENCOUNTER — OFFICE VISIT (OUTPATIENT)
Dept: ORTHOPEDICS | Facility: CLINIC | Age: 28
End: 2018-03-22
Payer: COMMERCIAL

## 2018-03-22 DIAGNOSIS — M25.531 WRIST PAIN, RIGHT: ICD-10-CM

## 2018-03-22 DIAGNOSIS — M77.8 RIGHT WRIST TENDONITIS: ICD-10-CM

## 2018-03-22 DIAGNOSIS — M67.40 GANGLION CYST: ICD-10-CM

## 2018-03-22 DIAGNOSIS — M25.531 WRIST PAIN, RIGHT: Primary | ICD-10-CM

## 2018-03-22 PROCEDURE — 20550 NJX 1 TENDON SHEATH/LIGAMENT: CPT | Mod: RT,S$GLB,, | Performed by: ORTHOPAEDIC SURGERY

## 2018-03-22 PROCEDURE — 73100 X-RAY EXAM OF WRIST: CPT | Mod: 26,RT,, | Performed by: RADIOLOGY

## 2018-03-22 PROCEDURE — 73100 X-RAY EXAM OF WRIST: CPT | Mod: TC,FY,RT

## 2018-03-22 PROCEDURE — 99999 PR PBB SHADOW E&M-EST. PATIENT-LVL III: CPT | Mod: PBBFAC,,, | Performed by: ORTHOPAEDIC SURGERY

## 2018-03-22 PROCEDURE — 99203 OFFICE O/P NEW LOW 30 MIN: CPT | Mod: 25,S$GLB,, | Performed by: ORTHOPAEDIC SURGERY

## 2018-03-22 RX ORDER — TRIAMCINOLONE ACETONIDE 40 MG/ML
40 INJECTION, SUSPENSION INTRA-ARTICULAR; INTRAMUSCULAR
Status: COMPLETED | OUTPATIENT
Start: 2018-03-22 | End: 2018-03-22

## 2018-03-22 RX ADMIN — TRIAMCINOLONE ACETONIDE 40 MG: 40 INJECTION, SUSPENSION INTRA-ARTICULAR; INTRAMUSCULAR at 08:03

## 2018-03-22 NOTE — PROGRESS NOTES
INITIAL VISIT HISTORY:  A 28-year-old female presents for evaluation of right   wrist pain, which has been present on and off for the past several years.  She   saw Dr. Millan for this a couple of years ago and she may have had an MRI she   thinks, which showed a small ganglion cyst, but apparently nothing specifically   was done for it.  Recently her symptoms have gotten worse in the wrist, usually   with certain activities like typing and writing.  She does work as an   occupational therapist.  No numbness or tingling reported.  No trauma reported.    Most of the pain seems to be worse with hyperextension of the wrist.    PAST MEDICAL HISTORY:  Significant for ALL, allergies, anxiety, depression and   irritable bowel syndrome.    PAST SURGICAL HISTORY:  Includes cholecystectomy, bone marrow biopsy and   tonsillectomy.    FAMILY HISTORY:  Positive for diabetes, heart disease, hyperlipidemia, and   ulcerative colitis.    SOCIAL HISTORY:  The patient does not smoke.  Drinks occasionally.    REVIEW OF SYSTEMS:  Negative fever, chills breath.    CURRENT MEDICATIONS:  Reviewed on chart.    ALLERGIES:  Duricef, vitamin K, Bactrim.    PHYSICAL EXAMINATION:  GENERAL:  Well-developed, well-nourished female in no acute distress, alert and   oriented x3.  EXTREMITIES:  Examination of the upper extremities significant for the right   hand and wrist, demonstrating some mild tenderness over the dorsum of the right   wrist over the extensor retinaculum.  She does have a slight amount of swelling   of the extensor retinaculum and the tendons near that area, but full range of   motion right wrist, some mild pain on hyperextension, no palpable ganglion,   Mei test negative, Tinel's sign negative.  Range of motion of fingers full.     strength intact.    X-RAYS:  AP and lateral right wrist demonstrate no significant abnormalities.    IMPRESSION:  1.  Probable extensor tendinitis, right wrist.  2.  Possible occult ganglion  cyst, right wrist.    PLAN:  I explained the nature of problem to the patient.  Recommended and   performed an injection right wrist dorsal.  After pause for timeout, she   identified the right wrist injected extensor retinaculum dorsally with   combination of Kenalog 20 mg, 0.5 mL Xylocaine, sterile technique.  She   tolerated the procedure well without complication.    I have also given her a wrist splint and recommended oral anti-inflammatories   and also Pennsaid topically.    Follow up in 1-2 months for recheck.  If symptoms persist, then we may recommend   an MRI scan of the wrist.      KRISTINE  dd: 03/22/2018 08:54:08 (CDT)  td: 03/23/2018 06:03:11 (CDT)  Doc ID   #[id number]  Job ID #[id number]    CC:

## 2018-03-22 NOTE — LETTER
March 22, 2018        Ashley Britt, DO  [address] [facility]  [address]  [facility] [address]             [facility]  [address]  [facility] [address]  Phone: [phone]   Patient: Terri Tavera   MR Number: [id number]   YOB: 1990   Date of Visit: 3/22/2018       Dear Dr. Britt:    Thank you for referring Terri Tavera to me for evaluation. Below are the relevant portions of my assessment and plan of care.            If you have questions, please do not hesitate to call me. I look forward to following Terri along with you.    Sincerely,      Butch Presley Jr., MD           CC  No Recipients

## 2018-03-23 ENCOUNTER — OFFICE VISIT (OUTPATIENT)
Dept: INTERNAL MEDICINE | Age: 28
End: 2018-03-23

## 2018-03-23 VITALS
DIASTOLIC BLOOD PRESSURE: 72 MMHG | SYSTOLIC BLOOD PRESSURE: 110 MMHG | BODY MASS INDEX: 28.63 KG/M2 | WEIGHT: 156.53 LBS | OXYGEN SATURATION: 99 % | HEART RATE: 82 BPM

## 2018-03-23 DIAGNOSIS — F41.9 ANXIETY: ICD-10-CM

## 2018-03-23 DIAGNOSIS — F07.81 POST CONCUSSION SYNDROME: ICD-10-CM

## 2018-03-23 DIAGNOSIS — K50.019 CROHN'S DISEASE OF SMALL INTESTINE WITH COMPLICATION (CMD): Primary | ICD-10-CM

## 2018-03-23 PROCEDURE — 99214 OFFICE O/P EST MOD 30 MIN: CPT | Performed by: INTERNAL MEDICINE

## 2018-03-23 RX ORDER — FLUOXETINE 10 MG/1
10 CAPSULE ORAL DAILY
Qty: 90 CAPSULE | Refills: 0 | Status: SHIPPED | OUTPATIENT
Start: 2018-03-23 | End: 2018-06-21 | Stop reason: SDUPTHER

## 2018-05-15 ENCOUNTER — WALK IN (OUTPATIENT)
Dept: URGENT CARE | Age: 28
End: 2018-05-15

## 2018-05-15 DIAGNOSIS — J02.9 SORE THROAT: Primary | ICD-10-CM

## 2018-05-15 DIAGNOSIS — J02.0 STREP PHARYNGITIS: ICD-10-CM

## 2018-05-15 LAB — S PYO AG THROAT QL: NEGATIVE

## 2018-05-15 PROCEDURE — 86308 HETEROPHILE ANTIBODY SCREEN: CPT | Performed by: NURSE PRACTITIONER

## 2018-05-15 PROCEDURE — 87081 CULTURE SCREEN ONLY: CPT | Performed by: INTERNAL MEDICINE

## 2018-05-15 PROCEDURE — 87880 STREP A ASSAY W/OPTIC: CPT | Performed by: NURSE PRACTITIONER

## 2018-05-15 PROCEDURE — 99213 OFFICE O/P EST LOW 20 MIN: CPT | Performed by: NURSE PRACTITIONER

## 2018-05-15 RX ORDER — AZITHROMYCIN 500 MG/1
500 TABLET, FILM COATED ORAL DAILY
Qty: 3 TABLET | Refills: 0 | Status: SHIPPED | OUTPATIENT
Start: 2018-05-15 | End: 2018-05-18

## 2018-05-15 ASSESSMENT — PAIN SCALES - GENERAL: PAINLEVEL: 5-6

## 2018-05-18 LAB
REPORT STATUS (RPT): NORMAL
S PYO SPEC QL CULT: NORMAL
SPECIMEN SOURCE: NORMAL

## 2018-05-20 ENCOUNTER — TELEPHONE (OUTPATIENT)
Dept: URGENT CARE | Age: 28
End: 2018-05-20

## 2018-06-19 ENCOUNTER — OFFICE VISIT (OUTPATIENT)
Dept: OPTOMETRY | Facility: CLINIC | Age: 28
End: 2018-06-19
Payer: COMMERCIAL

## 2018-06-19 DIAGNOSIS — H52.13 MYOPIA OF BOTH EYES: Primary | ICD-10-CM

## 2018-06-19 DIAGNOSIS — H57.02 EPISODIC ANISOCORIA: ICD-10-CM

## 2018-06-19 PROCEDURE — 99999 PR PBB SHADOW E&M-EST. PATIENT-LVL II: CPT | Mod: PBBFAC,,, | Performed by: OPTOMETRIST

## 2018-06-19 PROCEDURE — 92015 DETERMINE REFRACTIVE STATE: CPT | Mod: S$GLB,,, | Performed by: OPTOMETRIST

## 2018-06-19 PROCEDURE — 92004 COMPRE OPH EXAM NEW PT 1/>: CPT | Mod: S$GLB,,, | Performed by: OPTOMETRIST

## 2018-06-19 NOTE — PROGRESS NOTES
HPI     GEETA: 2012  Pt states distances  va has decreased with glasses. Denies f/f  +Headaches---does have neck issues     Natural Tears ou PRN   spex 5+ years old      Last edited by Nj Frost, OD on 6/19/2018  7:59 AM. (History)        ROS     Negative for: Constitutional, Gastrointestinal, Neurological, Skin,   Genitourinary, Musculoskeletal, HENT, Endocrine, Cardiovascular, Eyes,   Respiratory, Psychiatric, Allergic/Imm, Heme/Lymph    Last edited by Nj Frost, OD on 6/19/2018  8:07 AM. (History)        Assessment /Plan     For exam results, see Encounter Report.    Myopia of both eyes    Episodic anisocoria      1. Myopia--pt just wears spex for driving/movies/etc.  Wrote new Rx  2. Frontal HAs which occur in afternoons--rest/advil will relieve.  Doubt ocular cause since pt doesn't read or do computer with spex.  Suspect sinus HAs (pt does have sinus hx).  Advised PCP visit for HA work-up  3. Pt reports occasionally notices small difference in pupil size.  Today equal.  She does use allergy meds.  The next time she notices she will take a selfie photo and send to me via [facility] so I can check    PLAN:    rtc 1 yr

## 2018-06-21 ENCOUNTER — OFFICE VISIT (OUTPATIENT)
Dept: INTERNAL MEDICINE | Age: 28
End: 2018-06-21

## 2018-06-21 VITALS
HEART RATE: 70 BPM | WEIGHT: 156.53 LBS | BODY MASS INDEX: 28.63 KG/M2 | OXYGEN SATURATION: 98 % | DIASTOLIC BLOOD PRESSURE: 76 MMHG | SYSTOLIC BLOOD PRESSURE: 118 MMHG

## 2018-06-21 DIAGNOSIS — K21.9 GASTROESOPHAGEAL REFLUX DISEASE WITHOUT ESOPHAGITIS: ICD-10-CM

## 2018-06-21 DIAGNOSIS — F41.9 ANXIETY: Primary | ICD-10-CM

## 2018-06-21 PROCEDURE — 99214 OFFICE O/P EST MOD 30 MIN: CPT | Performed by: INTERNAL MEDICINE

## 2018-06-21 RX ORDER — FLUOXETINE 10 MG/1
10 CAPSULE ORAL DAILY
Qty: 90 CAPSULE | Refills: 1 | Status: SHIPPED | OUTPATIENT
Start: 2018-06-21 | End: 2018-12-22 | Stop reason: SDUPTHER

## 2018-06-27 ENCOUNTER — OFFICE VISIT (OUTPATIENT)
Dept: PODIATRY | Facility: CLINIC | Age: 28
End: 2018-06-27
Payer: COMMERCIAL

## 2018-06-27 VITALS
HEIGHT: 64 IN | WEIGHT: 141 LBS | BODY MASS INDEX: 24.07 KG/M2 | HEART RATE: 67 BPM | DIASTOLIC BLOOD PRESSURE: 69 MMHG | SYSTOLIC BLOOD PRESSURE: 109 MMHG

## 2018-06-27 DIAGNOSIS — M79.675 TOE PAIN, LEFT: ICD-10-CM

## 2018-06-27 DIAGNOSIS — L03.032 PARONYCHIA OF TOENAIL, LEFT: Primary | ICD-10-CM

## 2018-06-27 DIAGNOSIS — L60.0 INGROWN NAIL: ICD-10-CM

## 2018-06-27 PROCEDURE — 99213 OFFICE O/P EST LOW 20 MIN: CPT | Mod: S$GLB,,, | Performed by: PODIATRIST

## 2018-06-27 PROCEDURE — 99999 PR PBB SHADOW E&M-EST. PATIENT-LVL III: CPT | Mod: PBBFAC,,, | Performed by: PODIATRIST

## 2018-06-27 PROCEDURE — 3008F BODY MASS INDEX DOCD: CPT | Mod: CPTII,S$GLB,, | Performed by: PODIATRIST

## 2018-06-27 RX ORDER — TOBRAMYCIN 3 MG/ML
SOLUTION/ DROPS OPHTHALMIC
Qty: 5 ML | Refills: 3 | Status: SHIPPED | OUTPATIENT
Start: 2018-06-27 | End: 2019-02-20

## 2018-06-27 NOTE — PROGRESS NOTES
Subjective:      Patient ID: Terri Tavera is a 28 y.o. female.    Chief Complaint: Ingrown Toenail (left big toe/ dr donohue/ dr white/ 10/07/2017)    Sharp deep pain and redness, ingrown nail medial left hallux.  Gradual onset, worsening over past several weeks, aggravated by increased weight bearing, shoe gear, pressure.  No previous medical treatment.  OTC pain med not helping. Denies trauma.  Relates two prior procedures same to attempt destruction.    Review of Systems   Constitution: Negative for chills, diaphoresis, fever, malaise/fatigue and night sweats.   Cardiovascular: Negative for claudication, cyanosis, leg swelling and syncope.   Skin: Positive for nail changes. Negative for color change, dry skin, rash, suspicious lesions and unusual hair distribution.   Musculoskeletal: Negative for falls, joint pain, joint swelling, muscle cramps, muscle weakness and stiffness.   Gastrointestinal: Negative for constipation, diarrhea, nausea and vomiting.   Neurological: Negative for brief paralysis, disturbances in coordination, focal weakness, numbness, paresthesias, sensory change and tremors.           Objective:      Physical Exam   Constitutional: She is oriented to person, place, and time. She appears well-developed and well-nourished. She is cooperative. No distress.   Cardiovascular:   Pulses:       Popliteal pulses are 2+ on the right side, and 2+ on the left side.        Dorsalis pedis pulses are 2+ on the right side, and 2+ on the left side.        Posterior tibial pulses are 2+ on the right side, and 2+ on the left side.   Capillary refill 3 seconds all toes/distal feet, all toes/both feet warm to touch.      Negative lymphadenopathy bilateral popliteal fossa and tarsal tunnel.      Negavie lower extremity edema bilateral.     Musculoskeletal:        Right ankle: She exhibits normal range of motion, no swelling, no ecchymosis, no deformity, no laceration and normal pulse. Achilles tendon normal. Achilles  tendon exhibits no pain, no defect and normal Hester's test results.   Normal angle, base, station of gait. All ten toes without clubbing, cyanosis, or signs of ischemia.  No pain to palpation bilateral lower extremities.  Range of motion, stability, muscle strength, and muscle tone normal bilateral feet and legs.     Lymphadenopathy:   Negative lymphadenopathy bilateral popliteal fossa and tarsal tunnel.    Negative lymphangitic streaking bilateral feet/ankles/legs.   Neurological: She is alert and oriented to person, place, and time. She has normal strength. She displays no atrophy and no tremor. No sensory deficit. She exhibits normal muscle tone. Gait normal.   Reflex Scores:       Patellar reflexes are 2+ on the right side and 2+ on the left side.       Achilles reflexes are 2+ on the right side and 2+ on the left side.  Negative tinel sign to percussion sural, superficial peroneal, deep peroneal, saphenous, and posterior tibial nerves right and left ankles and feet.       Skin: Skin is warm, dry and intact. Capillary refill takes 2 to 3 seconds. No abrasion, no bruising, no burn, no ecchymosis, no laceration, no lesion and no rash noted. She is not diaphoretic. No cyanosis or erythema. No pallor. Nails show no clubbing.   Visible and palpable ingrowth of toenail medial border left hallux with pain to palpation, and focal localized erythema and edema,  without ulceration, drainage, pus, tracking, fluctuance, malodor, or cardinal signs infection.    Otherwise, Skin is normal age and health appropriate color, turgor, texture, and temperature bilateral lower extremities without ulceration, hyperpigmentation, discoloration, masses nodules or cords palpated.  No ecchymosis, erythema, edema, or cardinal signs of infection bilateral lower extremities.         Psychiatric: She has a normal mood and affect.             Assessment:       Encounter Diagnoses   Name Primary?    Paronychia of toenail, left Yes    Toe  pain, left     Ingrown nail          Plan:       Terri was seen today for ingrown toenail.    Diagnoses and all orders for this visit:    Paronychia of toenail, left    Toe pain, left    Ingrown nail    Other orders  -     tobramycin sulfate 0.3% (TOBREX) 0.3 % ophthalmic solution; 1-2 drops topically twice daily to affected toe(s).      I counseled the patient on her conditions, their implications and medical management.        Discussed conservative treatment with shoes of adequate dimensions, material, and style to alleviate symptoms and delay or prevent surgical intervention.    Rx tobramycin drops bid. Cover left hallux all times with band aid changing daily.    Declines OR open wedge resection procedure.              Follow-up in about 1 week (around 7/4/2018).

## 2018-07-11 ENCOUNTER — OFFICE VISIT (OUTPATIENT)
Dept: PODIATRY | Facility: CLINIC | Age: 28
End: 2018-07-11
Payer: COMMERCIAL

## 2018-07-11 VITALS
SYSTOLIC BLOOD PRESSURE: 111 MMHG | HEART RATE: 74 BPM | DIASTOLIC BLOOD PRESSURE: 64 MMHG | HEIGHT: 64 IN | BODY MASS INDEX: 24.24 KG/M2 | WEIGHT: 142 LBS

## 2018-07-11 DIAGNOSIS — M79.675 TOE PAIN, LEFT: Primary | ICD-10-CM

## 2018-07-11 DIAGNOSIS — L60.0 INGROWN NAIL: ICD-10-CM

## 2018-07-11 PROCEDURE — 99499 UNLISTED E&M SERVICE: CPT | Mod: S$GLB,,, | Performed by: PODIATRIST

## 2018-07-11 PROCEDURE — 99999 PR PBB SHADOW E&M-EST. PATIENT-LVL III: CPT | Mod: PBBFAC,,, | Performed by: PODIATRIST

## 2018-07-11 PROCEDURE — 11750 EXCISION NAIL&NAIL MATRIX: CPT | Mod: TA,S$GLB,, | Performed by: PODIATRIST

## 2018-07-11 NOTE — PROGRESS NOTES
Subjective:      Patient ID: Terri Tavera is a 28 y.o. female.    Chief Complaint: Ingrown Toenail (dr donohue/dr white/ 10/07/2018)    Sharp deep pain and redness, ingrown nail medial left hallux.  Gradual onset, worsening over past several weeks, aggravated by increased weight bearing, shoe gear, pressure.  No improvement with topical creams and shoe changes. OTC pain med not helping. Denies trauma.  Relates two prior procedures same to attempt destruction.    Review of Systems   Constitution: Negative for chills, diaphoresis, fever, malaise/fatigue and night sweats.   Cardiovascular: Negative for claudication, cyanosis, leg swelling and syncope.   Skin: Positive for nail changes. Negative for color change, dry skin, rash, suspicious lesions and unusual hair distribution.   Musculoskeletal: Negative for falls, joint pain, joint swelling, muscle cramps, muscle weakness and stiffness.   Gastrointestinal: Negative for constipation, diarrhea, nausea and vomiting.   Neurological: Negative for brief paralysis, disturbances in coordination, focal weakness, numbness, paresthesias, sensory change and tremors.           Objective:      Physical Exam   Constitutional: She is oriented to person, place, and time. She appears well-developed and well-nourished. She is cooperative. No distress.   Cardiovascular:   Pulses:       Popliteal pulses are 2+ on the right side, and 2+ on the left side.        Dorsalis pedis pulses are 2+ on the right side, and 2+ on the left side.        Posterior tibial pulses are 2+ on the right side, and 2+ on the left side.   Capillary refill 3 seconds all toes/distal feet, all toes/both feet warm to touch.      Negative lymphadenopathy bilateral popliteal fossa and tarsal tunnel.      Negavie lower extremity edema bilateral.     Musculoskeletal:        Right ankle: She exhibits normal range of motion, no swelling, no ecchymosis, no deformity, no laceration and normal pulse. Achilles tendon normal.  Achilles tendon exhibits no pain, no defect and normal Hester's test results.   Normal angle, base, station of gait. All ten toes without clubbing, cyanosis, or signs of ischemia.  No pain to palpation bilateral lower extremities.  Range of motion, stability, muscle strength, and muscle tone normal bilateral feet and legs.     Lymphadenopathy:   Negative lymphadenopathy bilateral popliteal fossa and tarsal tunnel.    Negative lymphangitic streaking bilateral feet/ankles/legs.   Neurological: She is alert and oriented to person, place, and time. She has normal strength. She displays no atrophy and no tremor. No sensory deficit. She exhibits normal muscle tone. Gait normal.   Reflex Scores:       Patellar reflexes are 2+ on the right side and 2+ on the left side.       Achilles reflexes are 2+ on the right side and 2+ on the left side.  Negative tinel sign to percussion sural, superficial peroneal, deep peroneal, saphenous, and posterior tibial nerves right and left ankles and feet.       Skin: Skin is warm, dry and intact. Capillary refill takes 2 to 3 seconds. No abrasion, no bruising, no burn, no ecchymosis, no laceration, no lesion and no rash noted. She is not diaphoretic. No cyanosis or erythema. No pallor. Nails show no clubbing.   Visible and palpable ingrowth of toenail medial border left hallux with pain to palpation, and focal localized erythema and edema,  without ulceration, drainage, pus, tracking, fluctuance, malodor, or cardinal signs infection.    Otherwise, Skin is normal age and health appropriate color, turgor, texture, and temperature bilateral lower extremities without ulceration, hyperpigmentation, discoloration, masses nodules or cords palpated.  No ecchymosis, erythema, edema, or cardinal signs of infection bilateral lower extremities.         Psychiatric: She has a normal mood and affect.             Assessment:       Encounter Diagnoses   Name Primary?    Toe pain, left Yes    Ingrown  nail          Plan:       Terri was seen today for ingrown toenail.    Diagnoses and all orders for this visit:    Toe pain, left    Ingrown nail      I counseled the patient on her conditions, their implications and medical management.        Discussed conservative treatment with shoes of adequate dimensions, material, and style to alleviate symptoms and delay or prevent surgical intervention.    Rx tobramycin drops bid. Cover left hallux all times with band aid changing daily.    Declines OR open wedge resection procedure.    Matrixectomy medial left hallux.            Follow-up in about 2 weeks (around 7/25/2018).

## 2018-07-11 NOTE — PROCEDURES
Nail Removal  Date/Time: 7/11/2018 5:54 PM  Performed by: VIKRAM MATHIS  Authorized by: VIKRAM MATHIS     Consent Done?:  Yes (Written)    Location:  Left foot  Location detail:  Left big toe  Anesthesia:  Digital block  Local anesthetic: lidocaine 2% without epinephrine  Anesthetic total (ml):  3  Preparation:  Skin prepped with alcohol    Amount removed:  Partial  Nail removed location: medial.  Wedge excision of skin of nail fold: No    Nail bed sutured?: No    Nail matrix removed:  Partial  Removed nail replaced and anchored: No    Dressing applied:  4x4, antibiotic ointment and dressing applied  Patient tolerance:  Patient tolerated the procedure well with no immediate complications     Time out performed prior to anesthetizing the surgical area and all patient identifiers, procedures, and site markings are in agreement.

## 2018-07-26 ENCOUNTER — OFFICE VISIT (OUTPATIENT)
Dept: PODIATRY | Facility: CLINIC | Age: 28
End: 2018-07-26
Payer: COMMERCIAL

## 2018-07-26 VITALS — SYSTOLIC BLOOD PRESSURE: 101 MMHG | DIASTOLIC BLOOD PRESSURE: 67 MMHG | HEART RATE: 74 BPM | HEIGHT: 64 IN

## 2018-07-26 DIAGNOSIS — Z98.890 POST-OPERATIVE STATE: Primary | ICD-10-CM

## 2018-07-26 PROCEDURE — 99024 POSTOP FOLLOW-UP VISIT: CPT | Mod: S$GLB,,, | Performed by: PODIATRIST

## 2018-07-26 PROCEDURE — 99999 PR PBB SHADOW E&M-EST. PATIENT-LVL III: CPT | Mod: PBBFAC,,, | Performed by: PODIATRIST

## 2018-07-26 NOTE — PROGRESS NOTES
Subjective:      Patient ID: Terri Tavera is a 28 y.o. female.    Chief Complaint: Follow-up and Ingrown Toenail (left great toe/ PCP Dr. Britt)    2 weeks s/p matrixectomy medial left hallux.  Covering with band aid dressings. Using topical antibiotic.    Review of Systems   Constitution: Negative for chills, diaphoresis, fever, weakness, malaise/fatigue and night sweats.   Skin: Positive for nail changes.           Objective:      Physical Exam   Cardiovascular:   Pulses:       Popliteal pulses are 2+ on the right side, and 2+ on the left side.        Dorsalis pedis pulses are 2+ on the right side, and 2+ on the left side.   All toes warm, pink.   Lymphadenopathy:   Negative lymphadenopathy bilateral popliteal fossa and tarsal tunnel.  Negative lymphangitic streaking bilateral foot/ankle bilateral.     Skin:   Wound medial left hallux  without  drainage, pus, tracking, fluctuance, malodor, or cardinal signs infection.    Otherwise, Skin is normal age and health appropriate color, turgor, texture, and temperature bilateral lower extremities without ulceration, hyperpigmentation, discoloration, masses nodules or cords palpated.  No ecchymosis, erythema, edema, or cardinal signs of infection bilateral lower extremities.               Assessment:       Encounter Diagnosis   Name Primary?    Post-operative state Yes         Plan:       Terri was seen today for follow-up and ingrown toenail.    Diagnoses and all orders for this visit:    Post-operative state      I counseled the patient on her conditions, their implications and medical management.        Continue dressings/shoes/topical antibiotic until completely healed.          Follow-up if symptoms worsen or fail to improve.

## 2018-11-13 ENCOUNTER — OFFICE VISIT (OUTPATIENT)
Dept: INTERNAL MEDICINE | Age: 28
End: 2018-11-13

## 2018-11-13 ENCOUNTER — LAB SERVICES (OUTPATIENT)
Dept: LAB | Age: 28
End: 2018-11-13

## 2018-11-13 ENCOUNTER — IMAGING SERVICES (OUTPATIENT)
Dept: GENERAL RADIOLOGY | Age: 28
End: 2018-11-13
Attending: INTERNAL MEDICINE

## 2018-11-13 VITALS
WEIGHT: 160.94 LBS | OXYGEN SATURATION: 100 % | SYSTOLIC BLOOD PRESSURE: 126 MMHG | DIASTOLIC BLOOD PRESSURE: 82 MMHG | HEART RATE: 102 BPM | BODY MASS INDEX: 29.44 KG/M2

## 2018-11-13 DIAGNOSIS — M54.9 BACK PAIN, UNSPECIFIED BACK LOCATION, UNSPECIFIED BACK PAIN LATERALITY, UNSPECIFIED CHRONICITY: ICD-10-CM

## 2018-11-13 DIAGNOSIS — M54.9 BACK PAIN, UNSPECIFIED BACK LOCATION, UNSPECIFIED BACK PAIN LATERALITY, UNSPECIFIED CHRONICITY: Primary | ICD-10-CM

## 2018-11-13 PROCEDURE — 87086 URINE CULTURE/COLONY COUNT: CPT | Performed by: INTERNAL MEDICINE

## 2018-11-13 PROCEDURE — 99214 OFFICE O/P EST MOD 30 MIN: CPT | Performed by: INTERNAL MEDICINE

## 2018-11-13 PROCEDURE — 72100 X-RAY EXAM L-S SPINE 2/3 VWS: CPT | Performed by: RADIOLOGY

## 2018-11-13 PROCEDURE — 87186 SC STD MICRODIL/AGAR DIL: CPT | Performed by: INTERNAL MEDICINE

## 2018-11-13 PROCEDURE — 81001 URINALYSIS AUTO W/SCOPE: CPT | Performed by: INTERNAL MEDICINE

## 2018-11-13 PROCEDURE — 87088 URINE BACTERIA CULTURE: CPT | Performed by: INTERNAL MEDICINE

## 2018-11-13 RX ORDER — CYCLOBENZAPRINE HCL 5 MG
TABLET ORAL
Qty: 30 TABLET | Refills: 0 | Status: SHIPPED | OUTPATIENT
Start: 2018-11-13 | End: 2019-12-29 | Stop reason: ALTCHOICE

## 2018-11-13 RX ORDER — METHYLPREDNISOLONE 4 MG/1
4 TABLET ORAL SEE ADMIN INSTRUCTIONS
Qty: 21 TABLET | Refills: 0 | Status: SHIPPED | OUTPATIENT
Start: 2018-11-13 | End: 2018-11-30 | Stop reason: ALTCHOICE

## 2018-11-14 ENCOUNTER — TELEPHONE (OUTPATIENT)
Dept: INTERNAL MEDICINE | Age: 28
End: 2018-11-14

## 2018-11-14 LAB
APPEARANCE UR: ABNORMAL
BACTERIA #/AREA URNS HPF: ABNORMAL /HPF
BILIRUB UR QL STRIP: NEGATIVE
COLOR UR: YELLOW
GLUCOSE UR STRIP-MCNC: NEGATIVE MG/DL
HGB UR QL STRIP: NEGATIVE
HYALINE CASTS #/AREA URNS LPF: ABNORMAL /LPF (ref 0–5)
KETONES UR STRIP-MCNC: NEGATIVE MG/DL
LEUKOCYTE ESTERASE UR QL STRIP: ABNORMAL
NITRITE UR QL STRIP: POSITIVE
PH UR STRIP: 6.5 UNITS (ref 5–7)
PROT UR STRIP-MCNC: NEGATIVE MG/DL
RBC #/AREA URNS HPF: ABNORMAL /HPF (ref 0–3)
SP GR UR STRIP: 1.02 (ref 1–1.03)
SPECIMEN SOURCE: ABNORMAL
SQUAMOUS #/AREA URNS HPF: ABNORMAL /HPF (ref 0–5)
UROBILINOGEN UR STRIP-MCNC: 0.2 MG/DL (ref 0–1)
WBC #/AREA URNS HPF: ABNORMAL /HPF (ref 0–5)

## 2018-11-14 RX ORDER — CIPROFLOXACIN 250 MG/1
250 TABLET, FILM COATED ORAL 2 TIMES DAILY
Qty: 6 TABLET | Refills: 0 | Status: SHIPPED | OUTPATIENT
Start: 2018-11-14 | End: 2018-11-30 | Stop reason: ALTCHOICE

## 2018-11-15 ENCOUNTER — TELEPHONE (OUTPATIENT)
Dept: INTERNAL MEDICINE | Age: 28
End: 2018-11-15

## 2018-11-15 LAB
BACTERIA UR CULT: ABNORMAL
ORGANISM: ABNORMAL
REPORT STATUS (RPT): ABNORMAL
SPECIMEN SOURCE: ABNORMAL

## 2018-11-30 ENCOUNTER — IMAGING SERVICES (OUTPATIENT)
Dept: GENERAL RADIOLOGY | Age: 28
End: 2018-11-30
Attending: NURSE PRACTITIONER

## 2018-11-30 ENCOUNTER — OFFICE VISIT (OUTPATIENT)
Dept: INTERNAL MEDICINE | Age: 28
End: 2018-11-30

## 2018-11-30 ENCOUNTER — NURSE TRIAGE (OUTPATIENT)
Dept: INTERNAL MEDICINE | Age: 28
End: 2018-11-30

## 2018-11-30 ENCOUNTER — TELEPHONE (OUTPATIENT)
Dept: INTERNAL MEDICINE | Age: 28
End: 2018-11-30

## 2018-11-30 VITALS
OXYGEN SATURATION: 98 % | DIASTOLIC BLOOD PRESSURE: 86 MMHG | WEIGHT: 163 LBS | BODY MASS INDEX: 29.81 KG/M2 | SYSTOLIC BLOOD PRESSURE: 120 MMHG | HEART RATE: 96 BPM

## 2018-11-30 DIAGNOSIS — M54.42 ACUTE MIDLINE LOW BACK PAIN WITH LEFT-SIDED SCIATICA: ICD-10-CM

## 2018-11-30 DIAGNOSIS — M54.42 ACUTE MIDLINE LOW BACK PAIN WITH LEFT-SIDED SCIATICA: Primary | ICD-10-CM

## 2018-11-30 PROCEDURE — 99213 OFFICE O/P EST LOW 20 MIN: CPT | Performed by: NURSE PRACTITIONER

## 2018-11-30 PROCEDURE — 72100 X-RAY EXAM L-S SPINE 2/3 VWS: CPT | Performed by: RADIOLOGY

## 2018-12-03 ENCOUNTER — TELEPHONE (OUTPATIENT)
Dept: INTERNAL MEDICINE | Age: 28
End: 2018-12-03

## 2018-12-03 DIAGNOSIS — M54.9 ACUTE BACK PAIN, UNSPECIFIED BACK LOCATION, UNSPECIFIED BACK PAIN LATERALITY: Primary | ICD-10-CM

## 2018-12-24 RX ORDER — FLUOXETINE 10 MG/1
CAPSULE ORAL
Qty: 90 CAPSULE | Refills: 1 | Status: SHIPPED | OUTPATIENT
Start: 2018-12-24 | End: 2019-06-23 | Stop reason: SDUPTHER

## 2019-01-08 ENCOUNTER — OFFICE VISIT (OUTPATIENT)
Dept: URGENT CARE | Facility: CLINIC | Age: 29
End: 2019-01-08
Payer: COMMERCIAL

## 2019-01-08 VITALS
RESPIRATION RATE: 18 BRPM | OXYGEN SATURATION: 100 % | DIASTOLIC BLOOD PRESSURE: 69 MMHG | WEIGHT: 142 LBS | SYSTOLIC BLOOD PRESSURE: 107 MMHG | HEIGHT: 64 IN | TEMPERATURE: 98 F | BODY MASS INDEX: 24.24 KG/M2 | HEART RATE: 72 BPM

## 2019-01-08 DIAGNOSIS — B00.1 RECURRENT COLD SORES: Primary | ICD-10-CM

## 2019-01-08 RX ORDER — VALACYCLOVIR HYDROCHLORIDE 1 G/1
2000 TABLET, FILM COATED ORAL 2 TIMES DAILY
Qty: 4 TABLET | Refills: 0 | Status: SHIPPED | OUTPATIENT
Start: 2019-01-08 | End: 2019-01-09

## 2019-01-08 NOTE — PROGRESS NOTES
"Subjective:       Patient ID: Terri Tavera is a 28 y.o. female.    Vitals:  height is 5' 4" (1.626 m) and weight is 64.4 kg (142 lb). Her temperature is 97.6 °F (36.4 °C). Her blood pressure is 107/69 and her pulse is 72. Her respiration is 18 and oxygen saturation is 100%.     Chief Complaint: Herpes Zoster (fever blisters on bottom lip x 5 weeks )    Patient presents with recurrent cold sores to her bottom lip states that it has been happening since December.  She states that 1 week ago in the other 1 I will come back and they just keep coming on.  She has tried over-the-counter medication with no relief.  History of cold sores.  No other complaints      Rash   This is a recurrent problem. The current episode started more than 1 month ago. The problem is unchanged. The affected locations include the lips. The rash is characterized by blistering. She was exposed to nothing. Pertinent negatives include no anorexia, congestion, cough, diarrhea, eye pain, facial edema, fatigue, fever, joint pain, nail changes, rhinorrhea, shortness of breath, sore throat or vomiting. Treatments tried: Elison. The treatment provided no relief.       Constitution: Negative for chills, fatigue and fever.   HENT: Negative for facial swelling, congestion and sore throat.    Neck: Negative for painful lymph nodes.   Eyes: Negative for eye itching, eye pain and eyelid swelling.   Respiratory: Negative for cough and shortness of breath.    Gastrointestinal: Negative for vomiting and diarrhea.   Musculoskeletal: Negative for joint pain and joint swelling.   Skin: Positive for lesion. Negative for color change, pale, rash, wound, abrasion, laceration, skin thickening/induration, puncture wound, erythema, bruising, abscess, avulsion and hives.   Allergic/Immunologic: Negative for environmental allergies, immunocompromised state and hives.   Hematologic/Lymphatic: Negative for swollen lymph nodes.       Objective:      Physical Exam "   Constitutional: She is oriented to person, place, and time. She appears well-developed and well-nourished.   HENT:   Head: Normocephalic and atraumatic. Head is without abrasion, without contusion and without laceration.   Right Ear: External ear normal.   Left Ear: External ear normal.   Nose: Nose normal.   Mouth/Throat: Oropharynx is clear and moist. Oral lesions present.       Eyes: Conjunctivae, EOM and lids are normal. Pupils are equal, round, and reactive to light.   Neck: Trachea normal, full passive range of motion without pain and phonation normal. Neck supple.   Cardiovascular: Normal rate, regular rhythm and normal heart sounds.   Pulmonary/Chest: Effort normal and breath sounds normal. No stridor. No respiratory distress.   Musculoskeletal: Normal range of motion.   Neurological: She is alert and oriented to person, place, and time.   Skin: Skin is warm, dry and intact. Capillary refill takes less than 2 seconds. No abrasion, no bruising, no burn, no ecchymosis, no laceration, no lesion and no rash noted. No erythema.   Psychiatric: She has a normal mood and affect. Her speech is normal and behavior is normal. Judgment and thought content normal. Cognition and memory are normal.   Nursing note and vitals reviewed.      Assessment:       1. Recurrent cold sores        Plan:         Recurrent cold sores  -     valACYclovir (VALTREX) 1000 MG tablet; Take 2 tablets (2,000 mg total) by mouth 2 (two) times daily. for 1 day  Dispense: 4 tablet; Refill: 0      Patient Instructions     Valtrex as directed.  Follow up with PCP.  Go to the ER or return here for any worsening symptoms.  Understanding Cold Sores  Cold sores are small blisters or sores on the lip or sometimes inside the mouth. Many people get them from time to time. Cold sores usually are not serious, and they usually heal in a week or two. They are caused by 2 related viruses, herpes simplex type 1 and 2. These viruses spread very easily. Many  people have one or both of these viruses in their body. More than 4 in every 5 people are infected with herpes simplex type 1. Once you have the virus that causes cold sores, it stays in your body for the rest of your life. But it can be inactive for long periods.  What causes a cold sore?  Cold sores are usually caused by herpes simplex virus type 1. Less often, they are caused by herpes simplex virus type 2. Herpes simplex virus type 2 is the more common cause of genital sores. The herpes viruses can enter the body through a break in the skin such as a scrape. Or they may enter through mucous membranes such as the lips or mouth. Some ways to get the viruses include:  · Kissing someone who has a cold sore  · Sharing a drinking glass, eating utensils, or lip balm with someone who has a cold sore  · Having oral sex with someone who has a cold sore  A  baby can also get the infection at birth.  If you have a herpes virus, you can to pass it along even when you dont have a sore.  Cold sores flare up occasionally. Things that can cause an outbreak include:  · Sun exposure  · Fever  · Stress or exhaustion  · Menstruation  · Skin irritation  · Another unrelated Illness such as pneumonia, urinary infection, or cancer  What are the symptoms of a cold sore?  Symptoms can include:  · A blister-like sore or cluster of sores. These often occur at the edge of the lips but may appear inside the mouth.  · Skin redness around the sores.  · Pain or itching in the area of the outbreak. Often the pain or itching develops 12 to 24 hours before the sore become visible.  · Flu-like symptoms, including swollen glands, headache, body ache, or fever. These typically occur only at the time of the first infection.  Cold sores may also occur on fingers. They may rarely infect the eyes, a serious possible complication.  Some people have symptoms a day or two before an outbreak. They may feel tenderness, burning, itching, or tingling  before a cold sore appears. Cold sores tend to come back in the same area that they first appeared.  How are cold sores treated?  Treatment for cold sores focuses on relieving and shortening symptoms. For people with frequent outbreaks, treatment works to decrease how often future episodes.  Treatments may include:  · Prescription or over-the-counter pain medicines. These can help with discomfort, especially if sores are inside the mouth.  · Antiviral medicines. These may be pills that are taken by mouth or a cream to apply to sores. They may help shorten an outbreak and reduce the severity of symptoms.  They may be used to help prevent future outbreaks if you have disabling recurrent infections.  · Self-care such as extra rest and drinking more fluids. These may help relieve the flu-like symptoms of a first outbreak.  How are cold sores diagnosed?  Your healthcare provider makes the diagnosis mainly by looking at the sores and doing a clinical exam.  This may be confirmed by swab tests or blood tests.  How can I prevent cold sores?  You can help reduce the spread of the herpes viruses that cause cold sores. This can help both you and others avoid getting cold sores. Follow these tips:  · Do not kiss others if you have a cold sore. Also avoid kissing someone with a cold sore.  · Do not share eating utensils, lip balm, razors, or towels with someone who has a cold sore.  · Wash your hands after touching the area of a cold sore. The herpes virus can be carried from your face to your hands when you touch the area of a cold sore. When this happens, wash your hands thoroughly, for at least 20 seconds. When you cant wash with soap and water, use an alcohol-based hand .  · Disinfect things you touch often, such as phones and keyboards.    · If you feel a cold sore coming on, do the same things you would do when a cold sore is present to avoid spreading the virus.  · Use condoms to help prevent passing on the  viruses through sex.  What are the possible complications of a cold sore?  Cold sores usually go away by themselves within 2 weeks. For most people cold sores are not serious. The viruses that cause cold sores can cause more serious illness, though. People who have a weak immune system may get more serious infections from herpes viruses. These include people being treated for cancer or who have HIV disease. Babies may also become very ill from a herpes infection.      When should I call my healthcare provider?  Call your healthcare provider right away if you have any of these:  · Fever of 100.4°F (38°C) or higher, or as directed  · Pain that gets worse  · You cannot eat or drink because of painful sores  · Symptoms dont get better within 5 to 7 days  · Blisters spread beyond the mouth or lip to areas on the chest, arms, face, or legs   Date Last Reviewed: 3/28/2016  © 9326-2974 [employer]. [address] [place] [address], [place] [address]. All rights reserved. This information is not intended as a substitute for professional medical care. Always follow your healthcare professional's instructions.

## 2019-01-08 NOTE — PATIENT INSTRUCTIONS
Valtrex as directed.  Follow up with PCP.  Go to the ER or return here for any worsening symptoms.  Understanding Cold Sores  Cold sores are small blisters or sores on the lip or sometimes inside the mouth. Many people get them from time to time. Cold sores usually are not serious, and they usually heal in a week or two. They are caused by 2 related viruses, herpes simplex type 1 and 2. These viruses spread very easily. Many people have one or both of these viruses in their body. More than 4 in every 5 people are infected with herpes simplex type 1. Once you have the virus that causes cold sores, it stays in your body for the rest of your life. But it can be inactive for long periods.  What causes a cold sore?  Cold sores are usually caused by herpes simplex virus type 1. Less often, they are caused by herpes simplex virus type 2. Herpes simplex virus type 2 is the more common cause of genital sores. The herpes viruses can enter the body through a break in the skin such as a scrape. Or they may enter through mucous membranes such as the lips or mouth. Some ways to get the viruses include:  · Kissing someone who has a cold sore  · Sharing a drinking glass, eating utensils, or lip balm with someone who has a cold sore  · Having oral sex with someone who has a cold sore  A  baby can also get the infection at birth.  If you have a herpes virus, you can to pass it along even when you dont have a sore.  Cold sores flare up occasionally. Things that can cause an outbreak include:  · Sun exposure  · Fever  · Stress or exhaustion  · Menstruation  · Skin irritation  · Another unrelated Illness such as pneumonia, urinary infection, or cancer  What are the symptoms of a cold sore?  Symptoms can include:  · A blister-like sore or cluster of sores. These often occur at the edge of the lips but may appear inside the mouth.  · Skin redness around the sores.  · Pain or itching in the area of the outbreak. Often the pain or  itching develops 12 to 24 hours before the sore become visible.  · Flu-like symptoms, including swollen glands, headache, body ache, or fever. These typically occur only at the time of the first infection.  Cold sores may also occur on fingers. They may rarely infect the eyes, a serious possible complication.  Some people have symptoms a day or two before an outbreak. They may feel tenderness, burning, itching, or tingling before a cold sore appears. Cold sores tend to come back in the same area that they first appeared.  How are cold sores treated?  Treatment for cold sores focuses on relieving and shortening symptoms. For people with frequent outbreaks, treatment works to decrease how often future episodes.  Treatments may include:  · Prescription or over-the-counter pain medicines. These can help with discomfort, especially if sores are inside the mouth.  · Antiviral medicines. These may be pills that are taken by mouth or a cream to apply to sores. They may help shorten an outbreak and reduce the severity of symptoms.  They may be used to help prevent future outbreaks if you have disabling recurrent infections.  · Self-care such as extra rest and drinking more fluids. These may help relieve the flu-like symptoms of a first outbreak.  How are cold sores diagnosed?  Your healthcare provider makes the diagnosis mainly by looking at the sores and doing a clinical exam.  This may be confirmed by swab tests or blood tests.  How can I prevent cold sores?  You can help reduce the spread of the herpes viruses that cause cold sores. This can help both you and others avoid getting cold sores. Follow these tips:  · Do not kiss others if you have a cold sore. Also avoid kissing someone with a cold sore.  · Do not share eating utensils, lip balm, razors, or towels with someone who has a cold sore.  · Wash your hands after touching the area of a cold sore. The herpes virus can be carried from your face to your hands when you  touch the area of a cold sore. When this happens, wash your hands thoroughly, for at least 20 seconds. When you cant wash with soap and water, use an alcohol-based hand .  · Disinfect things you touch often, such as phones and keyboards.    · If you feel a cold sore coming on, do the same things you would do when a cold sore is present to avoid spreading the virus.  · Use condoms to help prevent passing on the viruses through sex.  What are the possible complications of a cold sore?  Cold sores usually go away by themselves within 2 weeks. For most people cold sores are not serious. The viruses that cause cold sores can cause more serious illness, though. People who have a weak immune system may get more serious infections from herpes viruses. These include people being treated for cancer or who have HIV disease. Babies may also become very ill from a herpes infection.      When should I call my healthcare provider?  Call your healthcare provider right away if you have any of these:  · Fever of 100.4°F (38°C) or higher, or as directed  · Pain that gets worse  · You cannot eat or drink because of painful sores  · Symptoms dont get better within 5 to 7 days  · Blisters spread beyond the mouth or lip to areas on the chest, arms, face, or legs   Date Last Reviewed: 3/28/2016  © 7720-9469 [employer]. [address] [place] [address], [place] [address]. All rights reserved. This information is not intended as a substitute for professional medical care. Always follow your healthcare professional's instructions.

## 2019-02-04 ENCOUNTER — OFFICE VISIT (OUTPATIENT)
Dept: OBSTETRICS AND GYNECOLOGY | Facility: CLINIC | Age: 29
End: 2019-02-04
Payer: COMMERCIAL

## 2019-02-04 VITALS — DIASTOLIC BLOOD PRESSURE: 68 MMHG | SYSTOLIC BLOOD PRESSURE: 100 MMHG | BODY MASS INDEX: 24.9 KG/M2 | WEIGHT: 145.06 LBS

## 2019-02-04 DIAGNOSIS — Z30.431 IUD CHECK UP: ICD-10-CM

## 2019-02-04 DIAGNOSIS — Z01.419 ENCOUNTER FOR GYNECOLOGICAL EXAMINATION: Primary | ICD-10-CM

## 2019-02-04 DIAGNOSIS — Z12.4 PAP SMEAR FOR CERVICAL CANCER SCREENING: ICD-10-CM

## 2019-02-04 DIAGNOSIS — Z11.3 SCREEN FOR STD (SEXUALLY TRANSMITTED DISEASE): ICD-10-CM

## 2019-02-04 PROCEDURE — 99999 PR PBB SHADOW E&M-EST. PATIENT-LVL IV: CPT | Mod: PBBFAC,,, | Performed by: OBSTETRICS & GYNECOLOGY

## 2019-02-04 PROCEDURE — 87491 CHLMYD TRACH DNA AMP PROBE: CPT

## 2019-02-04 PROCEDURE — 99999 PR PBB SHADOW E&M-EST. PATIENT-LVL IV: ICD-10-PCS | Mod: PBBFAC,,, | Performed by: OBSTETRICS & GYNECOLOGY

## 2019-02-04 PROCEDURE — 99395 PR PREVENTIVE VISIT,EST,18-39: ICD-10-PCS | Mod: S$GLB,,, | Performed by: OBSTETRICS & GYNECOLOGY

## 2019-02-04 PROCEDURE — 88175 CYTOPATH C/V AUTO FLUID REDO: CPT

## 2019-02-04 PROCEDURE — 99395 PREV VISIT EST AGE 18-39: CPT | Mod: S$GLB,,, | Performed by: OBSTETRICS & GYNECOLOGY

## 2019-02-04 NOTE — PROGRESS NOTES
HPI: Pt is a 28 y.o.  female who presents for routine annual exam. She uses [employer] for contraception since 4/7/17. She does want STD screening. No complaints.       ROS:  GENERAL: Feeling well overall. Denies fever or chills.   SKIN: Denies rash or lesions.   HEAD: Denies head injury or headache.   NODES: Denies enlarged lymph nodes.   CHEST: Denies chest pain or shortness of breath.   CARDIOVASCULAR: Denies palpitations or left sided chest pain.   ABDOMEN: No abdominal pain, constipation, diarrhea, nausea, vomiting or rectal bleeding.   URINARY: No dysuria, hematuria, or burning on urination.  REPRODUCTIVE: See HPI.   BREASTS: Denies pain, lumps, or nipple discharge.   HEMATOLOGIC: No easy bruisability or excessive bleeding.   MUSCULOSKELETAL: Denies joint pain or swelling.   NEUROLOGIC: Denies syncope or weakness.   PSYCHIATRIC: Denies depression, anxiety or mood swings.    PE:   APPEARANCE: Well nourished, well developed, White female in no acute distress.  NODES: no cervical, supraclavicular, or inguinal lymphadenopathy  BREASTS: Symmetrical, no skin changes or visible lesions. No palpable masses, nipple discharge or adenopathy bilaterally.  ABDOMEN: Soft. No tenderness or masses. No distention. No hernias palpated. No CVA tenderness.  VULVA: No lesions. Normal external female genitalia.  URETHRAL MEATUS: Normal size and location, no lesions, no prolapse.  URETHRA: No masses, tenderness, or prolapse.  VAGINA: Moist. No lesions or lacerations noted. No abnormal discharge present. No odor present.   CERVIX: No lesions or discharge. No cervical motion tenderness. Strings seen (1cm out)  UTERUS: Normal size, regular shape, mobile, non-tender.  ADNEXA: No tenderness. No fullness or masses palpated in the adnexal regions.   ANUS PERINEUM: Normal.      Diagnosis:  1. Encounter for gynecological examination    2. Pap smear for cervical cancer screening    3. IUD check up    4. Screen for STD (sexually transmitted  disease)        Plan:     Orders Placed This Encounter    C. trachomatis/N. gonorrhoeae by AMP DNA    Liquid-based pap smear, screening       Patient was counseled today on the new ACS guidelines for cervical cytology screening as well as the current recommendations for breast cancer screening. She was counseled to follow up with her PCP for other routine health maintenance. Counseling session lasted approximately 10 minutes, and all her questions were answered.    Follow-up with me in 1 year for routine exam; pap in 3 years. Will need new IUD next year. Will upgrade to Kyleena if she decides to replace.

## 2019-02-06 LAB
C TRACH DNA SPEC QL NAA+PROBE: NOT DETECTED
N GONORRHOEA DNA SPEC QL NAA+PROBE: NOT DETECTED

## 2019-02-20 ENCOUNTER — HOSPITAL ENCOUNTER (OUTPATIENT)
Dept: RADIOLOGY | Facility: HOSPITAL | Age: 29
Discharge: HOME | End: 2019-02-20
Attending: INTERNAL MEDICINE
Payer: COMMERCIAL

## 2019-02-20 ENCOUNTER — OFFICE VISIT (OUTPATIENT)
Dept: INTERNAL MEDICINE | Facility: CLINIC | Age: 29
End: 2019-02-20
Payer: COMMERCIAL

## 2019-02-20 VITALS
WEIGHT: 147.5 LBS | BODY MASS INDEX: 26.13 KG/M2 | HEIGHT: 63 IN | DIASTOLIC BLOOD PRESSURE: 62 MMHG | HEART RATE: 84 BPM | SYSTOLIC BLOOD PRESSURE: 122 MMHG

## 2019-02-20 DIAGNOSIS — Z85.6 H/O ACUTE LYMPHOID LEUKEMIA: ICD-10-CM

## 2019-02-20 DIAGNOSIS — M54.2 CERVICALGIA: ICD-10-CM

## 2019-02-20 DIAGNOSIS — Z00.00 ANNUAL PHYSICAL EXAM: Primary | ICD-10-CM

## 2019-02-20 PROCEDURE — 99395 PR PREVENTIVE VISIT,EST,18-39: ICD-10-PCS | Mod: S$GLB,,, | Performed by: INTERNAL MEDICINE

## 2019-02-20 PROCEDURE — 99395 PREV VISIT EST AGE 18-39: CPT | Mod: S$GLB,,, | Performed by: INTERNAL MEDICINE

## 2019-02-20 PROCEDURE — 99999 PR PBB SHADOW E&M-EST. PATIENT-LVL III: CPT | Mod: PBBFAC,,, | Performed by: INTERNAL MEDICINE

## 2019-02-20 PROCEDURE — 99999 PR PBB SHADOW E&M-EST. PATIENT-LVL III: ICD-10-PCS | Mod: PBBFAC,,, | Performed by: INTERNAL MEDICINE

## 2019-02-20 PROCEDURE — 72040 XR CERVICAL SPINE AP LATERAL: ICD-10-PCS | Mod: 26,,, | Performed by: RADIOLOGY

## 2019-02-20 PROCEDURE — 72040 X-RAY EXAM NECK SPINE 2-3 VW: CPT | Mod: 26,,, | Performed by: RADIOLOGY

## 2019-02-20 PROCEDURE — 72040 X-RAY EXAM NECK SPINE 2-3 VW: CPT | Mod: TC

## 2019-02-20 NOTE — PROGRESS NOTES
"Subjective:       Patient ID: Terri Tavera is a 29 y.o. female.    Chief Complaint: Annual Exam   This is a 29-year-old who presents today for physical.  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  She has continued to follow with her pediatric oncologist annually and plans to make her follow-up soon she has been doing well since her previous treatments without difficulty.  She does have a history of depression anxiety since her previous childhood illness following with an outlying psychiatrist Dr. Hernández and currently on citalopram an occasional Xanax she sees him regularly.  She does have trouble with her neck on occasion with difficulty with range of motion she does a lot of physical work as an occupational therapist.  She reports did have some trouble with infection around the time of her therapy has had slightly low unit immunoglobulins but has not needed treatment       HPI  Review of Systems   Constitutional: Negative for fever.   HENT:        Neck bothers her at times    Respiratory: Negative for cough, shortness of breath and wheezing.    Cardiovascular: Negative for chest pain and palpitations.   Gastrointestinal: Negative for abdominal pain and constipation.   Neurological: Negative for dizziness.       Objective:     Blood pressure 122/62, pulse 84, height 5' 3" (1.6 m), weight 66.9 kg (147 lb 7.8 oz), last menstrual period 01/31/2019.    Physical Exam   Constitutional: No distress.   HENT:   Head: Normocephalic.   Mouth/Throat: Oropharynx is clear and moist.   Eyes: No scleral icterus.   Neck: Neck supple.   Cardiovascular: Normal rate, regular rhythm and normal heart sounds. Exam reveals no gallop and no friction rub. "   No murmur heard.  Pulmonary/Chest: Effort normal and breath sounds normal. No respiratory distress.   Breast : normal no masses or tenderness    Abdominal: Soft. Bowel sounds are normal. She exhibits no mass. There is no tenderness.   Musculoskeletal: She exhibits no edema.   Neurological: She is alert.   Skin: No erythema.   Psychiatric: She has a normal mood and affect.   Vitals reviewed.      Assessment:       1. Annual physical exam    2. Cervicalgia    3. H/O acute lymphoid leukemia        Plan:       Terri was seen today for annual exam.    Diagnoses and all orders for this visit:    Annual physical exam  -     Basic metabolic panel; Future  -     CBC auto differential; Future  -     Hepatic function panel; Future  -     Lipid panel; Future  -     TSH; Future  -     T4, free; Future    Cervicalgia  History of we discussed conservative measures we discussed physical therapy if she would like to do so she will call  -     X-Ray Cervical Spine AP And Lateral; Future    H/O acute lymphoid leukemia  History of she continues to follow with her pediatric oncologist he is status post treatment with chemotherapy along with brain and spinal cord radiation when she was 12  She will call to doc her annual as planned     History of depression anxiety she is following with outlying psychiatrist    She had her flu shot is up-to-date on her Pap and pelvic

## 2019-02-26 ENCOUNTER — LAB VISIT (OUTPATIENT)
Dept: LAB | Facility: HOSPITAL | Age: 29
End: 2019-02-26
Attending: INTERNAL MEDICINE
Payer: COMMERCIAL

## 2019-02-26 DIAGNOSIS — Z00.00 ANNUAL PHYSICAL EXAM: ICD-10-CM

## 2019-02-26 LAB
ALBUMIN SERPL BCP-MCNC: 4 G/DL
ALP SERPL-CCNC: 55 U/L
ALT SERPL W/O P-5'-P-CCNC: 14 U/L
ANION GAP SERPL CALC-SCNC: 7 MMOL/L
AST SERPL-CCNC: 19 U/L
BASOPHILS # BLD AUTO: 0.05 K/UL
BASOPHILS NFR BLD: 0.8 %
BILIRUB DIRECT SERPL-MCNC: 0.5 MG/DL
BILIRUB SERPL-MCNC: 1.4 MG/DL
BUN SERPL-MCNC: 9 MG/DL
CALCIUM SERPL-MCNC: 9.1 MG/DL
CHLORIDE SERPL-SCNC: 107 MMOL/L
CHOLEST SERPL-MCNC: 155 MG/DL
CHOLEST/HDLC SERPL: 2.5 {RATIO}
CO2 SERPL-SCNC: 26 MMOL/L
CREAT SERPL-MCNC: 0.8 MG/DL
DIFFERENTIAL METHOD: ABNORMAL
EOSINOPHIL # BLD AUTO: 0.1 K/UL
EOSINOPHIL NFR BLD: 2 %
ERYTHROCYTE [DISTWIDTH] IN BLOOD BY AUTOMATED COUNT: 12 %
EST. GFR  (AFRICAN AMERICAN): >60 ML/MIN/1.73 M^2
EST. GFR  (NON AFRICAN AMERICAN): >60 ML/MIN/1.73 M^2
GLUCOSE SERPL-MCNC: 101 MG/DL
HCT VFR BLD AUTO: 40.7 %
HDLC SERPL-MCNC: 62 MG/DL
HDLC SERPL: 40 %
HGB BLD-MCNC: 14.3 G/DL
IMM GRANULOCYTES # BLD AUTO: 0.01 K/UL
IMM GRANULOCYTES NFR BLD AUTO: 0.2 %
LDLC SERPL CALC-MCNC: 83.2 MG/DL
LYMPHOCYTES # BLD AUTO: 1.9 K/UL
LYMPHOCYTES NFR BLD: 28.3 %
MCH RBC QN AUTO: 31.2 PG
MCHC RBC AUTO-ENTMCNC: 35.1 G/DL
MCV RBC AUTO: 89 FL
MONOCYTES # BLD AUTO: 0.5 K/UL
MONOCYTES NFR BLD: 8.2 %
NEUTROPHILS # BLD AUTO: 4 K/UL
NEUTROPHILS NFR BLD: 60.5 %
NONHDLC SERPL-MCNC: 93 MG/DL
NRBC BLD-RTO: 0 /100 WBC
PLATELET # BLD AUTO: 219 K/UL
PMV BLD AUTO: 9.3 FL
POTASSIUM SERPL-SCNC: 4.4 MMOL/L
PROT SERPL-MCNC: 6.7 G/DL
RBC # BLD AUTO: 4.58 M/UL
SODIUM SERPL-SCNC: 140 MMOL/L
T4 FREE SERPL-MCNC: 0.91 NG/DL
TRIGL SERPL-MCNC: 49 MG/DL
TSH SERPL DL<=0.005 MIU/L-ACNC: 0.97 UIU/ML
WBC # BLD AUTO: 6.58 K/UL

## 2019-02-26 PROCEDURE — 80048 BASIC METABOLIC PNL TOTAL CA: CPT

## 2019-02-26 PROCEDURE — 36415 COLL VENOUS BLD VENIPUNCTURE: CPT

## 2019-02-26 PROCEDURE — 84443 ASSAY THYROID STIM HORMONE: CPT

## 2019-02-26 PROCEDURE — 84439 ASSAY OF FREE THYROXINE: CPT

## 2019-02-26 PROCEDURE — 80061 LIPID PANEL: CPT

## 2019-02-26 PROCEDURE — 80076 HEPATIC FUNCTION PANEL: CPT

## 2019-02-26 PROCEDURE — 85025 COMPLETE CBC W/AUTO DIFF WBC: CPT

## 2019-03-24 ENCOUNTER — PATIENT MESSAGE (OUTPATIENT)
Dept: OBSTETRICS AND GYNECOLOGY | Facility: CLINIC | Age: 29
End: 2019-03-24

## 2019-05-20 ENCOUNTER — OFFICE VISIT (OUTPATIENT)
Dept: DERMATOLOGY | Facility: CLINIC | Age: 29
End: 2019-05-20
Payer: COMMERCIAL

## 2019-05-20 DIAGNOSIS — L70.0 ACNE VULGARIS: Primary | ICD-10-CM

## 2019-05-20 PROCEDURE — 99999 PR PBB SHADOW E&M-EST. PATIENT-LVL II: ICD-10-PCS | Mod: PBBFAC,,, | Performed by: NURSE PRACTITIONER

## 2019-05-20 PROCEDURE — 99214 OFFICE O/P EST MOD 30 MIN: CPT | Mod: S$GLB,,, | Performed by: NURSE PRACTITIONER

## 2019-05-20 PROCEDURE — 99214 PR OFFICE/OUTPT VISIT, EST, LEVL IV, 30-39 MIN: ICD-10-PCS | Mod: S$GLB,,, | Performed by: NURSE PRACTITIONER

## 2019-05-20 PROCEDURE — 99999 PR PBB SHADOW E&M-EST. PATIENT-LVL II: CPT | Mod: PBBFAC,,, | Performed by: NURSE PRACTITIONER

## 2019-05-20 RX ORDER — TRETINOIN 0.25 MG/G
CREAM TOPICAL
Qty: 30 G | Refills: 2 | Status: SHIPPED | OUTPATIENT
Start: 2019-05-20 | End: 2019-07-19

## 2019-05-20 RX ORDER — DOXYCYCLINE 100 MG/1
100 CAPSULE ORAL DAILY
Qty: 30 CAPSULE | Refills: 1 | Status: SHIPPED | OUTPATIENT
Start: 2019-05-20 | End: 2019-09-16

## 2019-05-20 RX ORDER — DAPSONE 50 MG/G
GEL TOPICAL
Qty: 60 G | Refills: 2 | Status: SHIPPED | OUTPATIENT
Start: 2019-05-20 | End: 2019-07-19

## 2019-05-20 NOTE — PATIENT INSTRUCTIONS

## 2019-05-20 NOTE — PROGRESS NOTES
Subjective:       Patient ID:  Terri Tavera is a 29 y.o. female who presents for   Chief Complaint   Patient presents with    Acne     jawline, X1yr, breakouts, cystic, otc face wash      Acne  - Initial  Affected locations: face and neck  Duration: 1 year  Signs / symptoms: tender and redness  Severity: mild  Timing: constant  Aggravated by: nothing  Treatments tried: Neutrogena wash; changed to alba hawaiian cleansser.        Review of Systems   Genitourinary: Negative for irregular periods (IUD).   Skin: Positive for activity-related sunscreen use. Negative for daily sunscreen use.        Objective:    Physical Exam   Constitutional: She appears well-developed and well-nourished. No distress.   Neurological: She is alert and oriented to person, place, and time. She is not disoriented.   Psychiatric: She has a normal mood and affect.   Skin:   Areas Examined (abnormalities noted in diagram):   Head / Face Inspection Performed  Neck Inspection Performed  Chest / Axilla Inspection Performed  Back Inspection Performed  RUE Inspected  LUE Inspection Performed         Diagram Legend     Erythematous scaling macule/papule c/w actinic keratosis       Vascular papule c/w angioma      Pigmented verrucoid papule/plaque c/w seborrheic keratosis      Yellow umbilicated papule c/w sebaceous hyperplasia      Irregularly shaped tan macule c/w lentigo     1-2 mm smooth white papules consistent with Milia      Movable subcutaneous cyst with punctum c/w epidermal inclusion cyst      Subcutaneous movable cyst c/w pilar cyst      Firm pink to brown papule c/w dermatofibroma      Pedunculated fleshy papule(s) c/w skin tag(s)      Evenly pigmented macule c/w junctional nevus     Mildly variegated pigmented, slightly irregular-bordered macule c/w mildly atypical nevus      Flesh colored to evenly pigmented papule c/w intradermal nevus       Pink pearly papule/plaque c/w basal cell carcinoma      Erythematous hyperkeratotic cursted  plaque c/w SCC      Surgical scar with no sign of skin cancer recurrence      Open and closed comedones      Inflammatory papules and pustules      Verrucoid papule consistent consistent with wart     Erythematous eczematous patches and plaques     Dystrophic onycholytic nail with subungual debris c/w onychomycosis     Umbilicated papule    Erythematous-base heme-crusted tan verrucoid plaque consistent with inflamed seborrheic keratosis     Erythematous Silvery Scaling Plaque c/w Psoriasis     See annotation      Assessment / Plan:        Acne vulgaris  -     tretinoin (AVITA) 0.025 % cream; Apply thin layer all over face nightly  Dispense: 30 g; Refill: 2  -     dapsone (ACZONE) 5 % topical gel; AAA BID prn acne lesion  Dispense: 60 g; Refill: 2  -     doxycycline (VIBRAMYCIN) 100 MG Cap; Take 1 capsule (100 mg total) by mouth once daily.  Dispense: 30 capsule; Refill: 1    Discussed benefits and risks of doxycyline therapy including but not limited to GI discomfort, esophageal irritation/ulceration, and increased sun sensitivity. Patient was counseled to take medicine with meals and at least 1 hour before lying down.     Discussed using pea-sized drop to entire face qhs.  Start applying every 2-3 nights then gradually increase to nightly application as tolerated.  May notice mild redness and irritation    Wash face BID    Dapsone BID prn spot treatment.         No follow-ups on file.

## 2019-06-24 RX ORDER — FLUOXETINE 10 MG/1
CAPSULE ORAL
Qty: 90 CAPSULE | Refills: 1 | Status: SHIPPED | OUTPATIENT
Start: 2019-06-24 | End: 2019-12-17 | Stop reason: SDUPTHER

## 2019-07-19 ENCOUNTER — OFFICE VISIT (OUTPATIENT)
Dept: DERMATOLOGY | Facility: CLINIC | Age: 29
End: 2019-07-19
Payer: COMMERCIAL

## 2019-07-19 DIAGNOSIS — L70.0 ACNE VULGARIS: Primary | ICD-10-CM

## 2019-07-19 PROCEDURE — 99999 PR PBB SHADOW E&M-EST. PATIENT-LVL II: ICD-10-PCS | Mod: PBBFAC,,, | Performed by: NURSE PRACTITIONER

## 2019-07-19 PROCEDURE — 99999 PR PBB SHADOW E&M-EST. PATIENT-LVL II: CPT | Mod: PBBFAC,,, | Performed by: NURSE PRACTITIONER

## 2019-07-19 PROCEDURE — 99213 OFFICE O/P EST LOW 20 MIN: CPT | Mod: S$GLB,,, | Performed by: NURSE PRACTITIONER

## 2019-07-19 PROCEDURE — 99213 PR OFFICE/OUTPT VISIT, EST, LEVL III, 20-29 MIN: ICD-10-PCS | Mod: S$GLB,,, | Performed by: NURSE PRACTITIONER

## 2019-07-19 RX ORDER — TRETINOIN 0.5 MG/G
CREAM TOPICAL
Qty: 30 G | Refills: 2 | Status: SHIPPED | OUTPATIENT
Start: 2019-07-19 | End: 2022-06-30

## 2019-07-19 RX ORDER — DAPSONE 75 MG/G
GEL TOPICAL
Qty: 60 G | Refills: 3 | Status: SHIPPED | OUTPATIENT
Start: 2019-07-19 | End: 2022-06-30

## 2019-07-19 NOTE — PROGRESS NOTES
Subjective:       Patient ID:  Terri Tavera is a 29 y.o. female who presents for   Chief Complaint   Patient presents with    Acne     follow-up; improving      Acne  - Follow-up  Symptom Course: last seen 5/20/19.  Currently using: po doxy x1 month. tretinoin qhs, dapsone 5%  Affected locations: face and neck  Signs / symptoms: redness and tender        Review of Systems   Genitourinary: Negative for irregular periods (IUD).   Skin: Positive for activity-related sunscreen use. Negative for daily sunscreen use.        Objective:    Physical Exam   Constitutional: She appears well-developed and well-nourished. No distress.   Neurological: She is alert and oriented to person, place, and time. She is not disoriented.   Psychiatric: She has a normal mood and affect.   Skin:   Areas Examined (abnormalities noted in diagram):   Head / Face Inspection Performed  Neck Inspection Performed  Chest / Axilla Inspection Performed  Back Inspection Performed  RUE Inspected  LUE Inspection Performed                   Diagram Legend     Erythematous scaling macule/papule c/w actinic keratosis       Vascular papule c/w angioma      Pigmented verrucoid papule/plaque c/w seborrheic keratosis      Yellow umbilicated papule c/w sebaceous hyperplasia      Irregularly shaped tan macule c/w lentigo     1-2 mm smooth white papules consistent with Milia      Movable subcutaneous cyst with punctum c/w epidermal inclusion cyst      Subcutaneous movable cyst c/w pilar cyst      Firm pink to brown papule c/w dermatofibroma      Pedunculated fleshy papule(s) c/w skin tag(s)      Evenly pigmented macule c/w junctional nevus     Mildly variegated pigmented, slightly irregular-bordered macule c/w mildly atypical nevus      Flesh colored to evenly pigmented papule c/w intradermal nevus       Pink pearly papule/plaque c/w basal cell carcinoma      Erythematous hyperkeratotic cursted plaque c/w SCC      Surgical scar with no sign of skin cancer  recurrence      Open and closed comedones      Inflammatory papules and pustules      Verrucoid papule consistent consistent with wart     Erythematous eczematous patches and plaques     Dystrophic onycholytic nail with subungual debris c/w onychomycosis     Umbilicated papule    Erythematous-base heme-crusted tan verrucoid plaque consistent with inflamed seborrheic keratosis     Erythematous Silvery Scaling Plaque c/w Psoriasis     See annotation      Assessment / Plan:        Acne vulgaris  -     tretinoin (RETIN-A) 0.05 % cream; Apply to face QHS. Wash off in morning.  Dispense: 30 g; Refill: 2  -     ACZONE 7.5 % GlwP; AAA face daily - bid  Dispense: 60 g; Refill: 3    increase tretinoin strength  aczone BID  Discussed adding PO spironolactone, patient would like to defer at this time.         Follow up if symptoms worsen or fail to improve.

## 2019-09-16 ENCOUNTER — HOSPITAL ENCOUNTER (EMERGENCY)
Facility: HOSPITAL | Age: 29
Discharge: HOME | End: 2019-09-16
Attending: EMERGENCY MEDICINE
Payer: COMMERCIAL

## 2019-09-16 VITALS
BODY MASS INDEX: 25.69 KG/M2 | HEIGHT: 63 IN | RESPIRATION RATE: 18 BRPM | OXYGEN SATURATION: 99 % | TEMPERATURE: 98 F | DIASTOLIC BLOOD PRESSURE: 77 MMHG | SYSTOLIC BLOOD PRESSURE: 110 MMHG | WEIGHT: 145 LBS | HEART RATE: 90 BPM

## 2019-09-16 DIAGNOSIS — M54.9 BACK PAIN: ICD-10-CM

## 2019-09-16 LAB
B-HCG UR QL: NEGATIVE
CTP QC/QA: YES

## 2019-09-16 PROCEDURE — 99284 EMERGENCY DEPT VISIT MOD MDM: CPT | Mod: ,,, | Performed by: EMERGENCY MEDICINE

## 2019-09-16 PROCEDURE — 99283 EMERGENCY DEPT VISIT LOW MDM: CPT | Mod: 25

## 2019-09-16 PROCEDURE — 25000003 PHARM REV CODE 250: Performed by: EMERGENCY MEDICINE

## 2019-09-16 PROCEDURE — 99284 PR EMERGENCY DEPT VISIT,LEVEL IV: ICD-10-PCS | Mod: ,,, | Performed by: EMERGENCY MEDICINE

## 2019-09-16 RX ORDER — HYDROCODONE BITARTRATE AND ACETAMINOPHEN 5; 325 MG/1; MG/1
1 TABLET ORAL
Status: COMPLETED | OUTPATIENT
Start: 2019-09-16 | End: 2019-09-16

## 2019-09-16 RX ORDER — HYDROCODONE BITARTRATE AND ACETAMINOPHEN 5; 325 MG/1; MG/1
1 TABLET ORAL EVERY 8 HOURS PRN
Qty: 9 TABLET | Refills: 0 | Status: SHIPPED | OUTPATIENT
Start: 2019-09-16 | End: 2019-09-19

## 2019-09-16 RX ORDER — IBUPROFEN 600 MG/1
600 TABLET ORAL EVERY 6 HOURS PRN
Qty: 20 TABLET | Refills: 0 | Status: SHIPPED | OUTPATIENT
Start: 2019-09-16 | End: 2019-09-23

## 2019-09-16 RX ORDER — IBUPROFEN 600 MG/1
600 TABLET ORAL
Status: COMPLETED | OUTPATIENT
Start: 2019-09-16 | End: 2019-09-16

## 2019-09-16 RX ADMIN — HYDROCODONE BITARTRATE AND ACETAMINOPHEN 1 TABLET: 5; 325 TABLET ORAL at 05:09

## 2019-09-16 RX ADMIN — IBUPROFEN 600 MG: 600 TABLET, FILM COATED ORAL at 03:09

## 2019-09-16 NOTE — DISCHARGE INSTRUCTIONS
Diagnosis: Back pain    Take ibuprofen (also called Advil, Motrin) for your pain. This medicine is available over-the-counter in 200 mg tablets.  - You may take 600 mg every 6 hours, or 800 mg every 8 hours as needed   - Do not take more than this amount. Too much ibuprofen can cause kidney problems, bleeding in your stomach, and other serious problems.   - Do not also take naproxen (Aleve) at the same time or on the same day  - If you have heart problems or uncontrolled high blood pressure, you should not take ibuprofen for more than 3 days without discussing with your doctor    If your pain is not controlled with ibuprofen, you may also take Norco (acetaminophen-hydrocodone).  - Take this medication only when needed for breakthrough pain.   - Do not take more than the prescribed amount to control your pain.  - Do not operate machinery, drive, exercise, perform caregiving, make important decisions or perform important tasks while taking Norco. It can make you drowsy or forgetful.  - Norco is addictive in as little as 3 days, so take only as needed.  - Norco can dangerously slow or stop your breathing if you take too much, or if you combine it with alcohol or sedating medicines (including Xanex, Ativan) or illegal drugs.   - Norco can make you constipated (hard to poop), so take fiber supplements and a stool softener while taking this medicine.   - This medication contains acetaminophen (Tylenol). Each pill has 325 mg of acetaminophen. Do not take more than 4,000 mg of acetaminophen within 24 hours as this may lead to liver damage.    Follow-up plan:  · Follow-up with: Primary care doctor within 3 - 5  days  · Follow-up for additional testing and/or evaluation as directed by your primary doctor    Return to the emergency department for reasons including:   - Severe pain not controlled by the pain medicine listed above   - You cannot walk because of pain or weakness  - Fevers (>100.4°F)  - Loss of bowel or bladder  control (dribbling of urine, urinating or pooping on self, or having accidents you wouldn't normally have)  - Not able to urinate  - Numbness of your genital or anal area  - New or worsening weakness or numbness of your arms or legs  - Shortness of breath or chest pain, vomiting with inability to hold down fluids, passing out/unconsciousness, or any other concerning symptoms.

## 2019-09-16 NOTE — ED PROVIDER NOTES
Source of History:  Patient    Chief complaint:  Back Pain (just PTA pt was laying in bed stretching her legs and felt a pop in her mid back area--more on L side than R side. reports pain radiates down to her legs and up her back, unable to turn her neck to the L side)    HPI:  Terri Tavera is a 29 y.o. female with no significant past medical history presenting to emergency department with complaint of left neck and back pain. Patient states that she felt a pop in her left upper thoracic area as she was laying in bed and stretching her legs.  This happened just shortly prior to arrival.  She has no numbness or weakness of the left arm.  No headache or neck pain. She does state that it is difficult for her to twist or move her body because of the discomfort.  She has not taken any medications for pain prior to arrival today.  She denies the possibility of pregnancy.  No previous injury. No low back pain.  No radiation of pain down the legs.  No chest pain or difficulty breathing.    ROS: As per HPI and below:  Review of Systems   Constitutional: Negative for fever.   Respiratory: Negative for shortness of breath.    Cardiovascular: Negative for chest pain.   Musculoskeletal: Positive for back pain. Negative for falls and neck pain.   Skin: Negative for rash.       Review of patient's allergies indicates:   Allergen Reactions    Vitamin k analogues     Duricef [cefadroxil] Hives     Hives and fever    Bactrim [sulfamethoxazole-trimethoprim] Nausea And Vomiting       No current facility-administered medications on file prior to encounter.      Current Outpatient Medications on File Prior to Encounter   Medication Sig Dispense Refill    citalopram (CELEXA) 40 MG tablet   1    ACZONE 7.5 % GlwP AAA face daily - bid 60 g 3    ALPRAZolam (XANAX) 0.25 MG tablet       fluticasone (FLONASE) 50 mcg/actuation nasal spray 2 sprays (100 mcg total) by Each Nare route once daily. 1 Bottle 0    tretinoin (RETIN-A) 0.05 %  cream Apply to face QHS. Wash off in morning. 30 g 2    [DISCONTINUED] doxycycline (VIBRAMYCIN) 100 MG Cap Take 1 capsule (100 mg total) by mouth once daily. 30 capsule 1       PMH:  As per HPI and below:  Past Medical History:   Diagnosis Date    ALL (acute lymphoblastic leukemia)     Allergy     Anxiety     Depression     Fever blister     History of cholecystectomy     Hypogammaglobulinaemia, unspecified     Irritable bowel syndrome      Past Surgical History:   Procedure Laterality Date    BONE MARROW BIOPSY      Central port and subsequent removal      CHOLECYSTECTOMY  8/11    lap    INCISION AND DRAINAGE OF WOUND      [employer]    INTRATHECAL PUMP IMPLANTATION      for chemo    TONSILLECTOMY         Social History     Socioeconomic History    Marital status: Single     Spouse name: Not on file    Number of children: Not on file    Years of education: Not on file    Highest education level: Not on file   Occupational History    Not on file   Social Needs    Financial resource strain: Not on file    Food insecurity:     Worry: Not on file     Inability: Not on file    Transportation needs:     Medical: Not on file     Non-medical: Not on file   Tobacco Use    Smoking status: Never Smoker    Smokeless tobacco: Never Used   Substance and Sexual Activity    Alcohol use: Yes     Alcohol/week: 1.2 oz     Types: 2 Glasses of wine per week    Drug use: No    Sexual activity: Not Currently   Lifestyle    Physical activity:     Days per week: Not on file     Minutes per session: Not on file    Stress: Not on file   Relationships    Social connections:     Talks on phone: Not on file     Gets together: Not on file     Attends [employer] service: Not on file     Active member of club or organization: Not on file     Attends meetings of clubs or organizations: Not on file     Relationship status: Not on file   Other Topics Concern    Are you pregnant or think you may be? Not Asked     Breast-feeding Not Asked   Social History Narrative    Not on file       Family History   Problem Relation Age of Onset    Ulcerative colitis Father     Heart disease Father     Diabetes Father     Hyperlipidemia Father     Cataracts Father     Hyperlipidemia Mother     Hashimoto's thyroiditis Mother     Ulcerative colitis Paternal Aunt     Diabetes Maternal Grandmother     Cancer Maternal Grandfather         lung cancer     Ulcerative colitis Paternal Grandmother     Cancer Paternal Grandmother         esophageal cancer     Celiac disease Neg Hx     Crohn's disease Neg Hx     Ovarian cancer Neg Hx     Breast cancer Neg Hx     Colon cancer Neg Hx     Melanoma Neg Hx     Amblyopia Neg Hx     Blindness Neg Hx     Glaucoma Neg Hx     Macular degeneration Neg Hx     Retinal detachment Neg Hx     Strabismus Neg Hx        Physical Exam:    Vitals:    09/16/19 0241   BP: 110/77   Pulse: 90   Resp: 18   Temp: 98.2 °F (36.8 °C)     Gen: No acute distress. Uncomfortable.  Mental Status:  Alert and oriented x 3.  Appropriate, conversant.  Skin: Warm, dry. No rashes seen.  Pulm: Clear to auscultation bilaterally.  No increased work of breathing  CV: Regular rate and rhythm.  Normal peripheral perfusion  MSK: Good range of motion all joints.  No deformities. No spasm or tenderness of the SCM.  Tenderness to palpation of the left paraspinal and trapezius muscle.  Left shoulder with normal range of motion, no deformity, no skin change.  Distal neurovascular exam is intact of the left upper extremity including 2+ radial pulse, normal sensation and motor in the hand.  Neuro:  No focal neuro deficit, speech normal    Initial Impression:  29-year-old female presenting to emergency department with complaint of upper thoracic and paraspinal pain.    Laboratory Studies:  Labs Reviewed   PREGNANCY TEST, URINE RAPID     Chart reviewed.    Imaging Results          X-Ray Thoracic Spine AP Lateral (Final result)   Result time 09/16/19 04:13:59    Final result by Abran Buckner MD (09/16/19 04:13:59)                 Impression:      No acute thoracic vertebral fracture.      Electronically signed by: Abran Buckner MD  Date:    09/16/2019  Time:    04:13             Narrative:    EXAMINATION:  XR THORACIC SPINE AP LATERAL    CLINICAL HISTORY:  Dorsalgia, unspecified    TECHNIQUE:  AP and lateral views of the thoracic spine.    COMPARISON:  None.    FINDINGS:  Alignment: Alignment is maintained.    Vertebrae: Vertebral body heights are maintained.  No suspicious appearing lytic or blastic lesions.    Discs and facets: Disc heights are maintained.    Miscellaneous: No additional findings.                                Medications Given:  Medications   ibuprofen tablet 600 mg (600 mg Oral Given 9/16/19 0308)   HYDROcodone-acetaminophen 5-325 mg per tablet 1 tablet (1 tablet Oral Given 9/16/19 0500)     MDM:    29 y.o. female with left upper thoracic and paraspinal discomfort.  She is afebrile, stable, nontoxic well hydrated well-appearing and neuro intact. I am not suspicious for any neurologic cardiac or respiratory abnormality today.  This is likely musculoskeletal.    Obtained x-ray to evaluate for any bony change or disc space narrowing, x-ray is unremarkable.     Patient received ibuprofen.  She declined Norco for her pain.  Pain is mildly improved.    Diagnostic Impression:    1. Back pain      Patient understands the plan and is in agreement, verbalized understanding, questions answered, return precautions discussed at bedside    Gabrielle Bauer MD  Emergency Medicine           Gabrielle Bauer MD  09/16/19 [id number]

## 2019-09-16 NOTE — ED TRIAGE NOTES
"Pt reports stretching in bed/extending body and felt a "pop" in the mid-thoracic spine. Has intense pain 9/10 just left of thoracic spine. Denies tingling or numbness. Pain with shoulder flexion >90 degrees, neck flexion/extension. Patient unable to rotate neck to left. Had pain radiating to L side of head that has since subsided. Patient unable to sit without pain. Patient's body is tense and she is standing against wall. Denies taking any medication. Patient is physical therapist.  "

## 2019-10-16 ENCOUNTER — OFFICE VISIT (OUTPATIENT)
Dept: INTERNAL MEDICINE | Facility: CLINIC | Age: 29
End: 2019-10-16
Payer: COMMERCIAL

## 2019-10-16 VITALS
BODY MASS INDEX: 26.45 KG/M2 | HEART RATE: 80 BPM | HEIGHT: 63 IN | WEIGHT: 149.25 LBS | TEMPERATURE: 98 F | DIASTOLIC BLOOD PRESSURE: 66 MMHG | SYSTOLIC BLOOD PRESSURE: 110 MMHG

## 2019-10-16 DIAGNOSIS — Z92.3 HX OF RADIATION THERAPY: ICD-10-CM

## 2019-10-16 DIAGNOSIS — Z85.6 HX OF ACUTE LYMPHOID LEUKEMIA IN REMISSION: ICD-10-CM

## 2019-10-16 DIAGNOSIS — M54.2 NECK PAIN: Primary | ICD-10-CM

## 2019-10-16 DIAGNOSIS — M54.6 THORACIC SPINE PAIN: ICD-10-CM

## 2019-10-16 PROCEDURE — 99999 PR PBB SHADOW E&M-EST. PATIENT-LVL IV: CPT | Mod: PBBFAC,,, | Performed by: INTERNAL MEDICINE

## 2019-10-16 PROCEDURE — 3008F PR BODY MASS INDEX (BMI) DOCUMENTED: ICD-10-PCS | Mod: CPTII,S$GLB,, | Performed by: INTERNAL MEDICINE

## 2019-10-16 PROCEDURE — 99999 PR PBB SHADOW E&M-EST. PATIENT-LVL IV: ICD-10-PCS | Mod: PBBFAC,,, | Performed by: INTERNAL MEDICINE

## 2019-10-16 PROCEDURE — 3008F BODY MASS INDEX DOCD: CPT | Mod: CPTII,S$GLB,, | Performed by: INTERNAL MEDICINE

## 2019-10-16 PROCEDURE — 99214 PR OFFICE/OUTPT VISIT, EST, LEVL IV, 30-39 MIN: ICD-10-PCS | Mod: S$GLB,,, | Performed by: INTERNAL MEDICINE

## 2019-10-16 PROCEDURE — 99214 OFFICE O/P EST MOD 30 MIN: CPT | Mod: S$GLB,,, | Performed by: INTERNAL MEDICINE

## 2019-10-16 RX ORDER — ALPRAZOLAM 0.5 MG/1
TABLET ORAL
Refills: 0 | COMMUNITY
Start: 2019-07-12 | End: 2022-04-18

## 2019-10-16 RX ORDER — METHOCARBAMOL 500 MG/1
500 TABLET, FILM COATED ORAL EVERY 8 HOURS PRN
Qty: 30 TABLET | Refills: 1 | Status: SHIPPED | OUTPATIENT
Start: 2019-10-16 | End: 2020-01-08 | Stop reason: SDUPTHER

## 2019-10-16 NOTE — PROGRESS NOTES
"Subjective:       Patient ID: Terri Tavera is a 29 y.o. female.    Chief Complaint: Neck Pain (both syptoms have been going on for a few months, but recently worsen for the past 3 weeks) and Back Pain (upper back)   This is a 29-year-old who presents today with neck and upper back discomfort reports she has had problems on occasion with her neck mildly and intermittently but she had episode about a month ago where she was stretching in bed and turned over she developed discomfort to the neck and upper thoracic region she had decreased range of motion of her neck and felt a popping sensation.  She was unable to turn her neck to the left and decreased rom went to er at that time Patient reports since that time she has been taking anti-inflammatories with some improvement but continues to have discomfort she did go to [facility] physical therapy for 2 weeks.  She reports he did give her some improvement but she continued to have pain with her prior history of cancer and radiation treatments she was concerned and would like to do additional imaging she had some x-rays which showed no significant abnormalities.  She does have some radiation on occasion into the left his shoulder but no numbness or weakness in the arm.  She feels she has decreased range of motion of the neck and discomfort in the midthoracic area as well.  She denies shortness of breath or chest discomfort.  She denies leg or lower back pain or swelling.    HPI  Review of Systems   Musculoskeletal: Positive for neck pain.        Upper back pain        Objective:     Blood pressure 110/66, pulse 80, temperature 98.2 °F (36.8 °C), height 5' 3" (1.6 m), weight 67.7 kg (149 lb 4 oz).    Physical Exam   Constitutional: No distress.   HENT:   Head: Normocephalic.   Mouth/Throat: Oropharynx is clear and moist.   Neck some disocmfort decreased rom    Eyes: No scleral icterus.   Neck: Neck supple.   Cardiovascular: Normal rate, regular rhythm and normal heart " sounds. Exam reveals no gallop and no friction rub.   No murmur heard.  Pulmonary/Chest: Effort normal and breath sounds normal. No respiratory distress.   Abdominal: Soft. Bowel sounds are normal. She exhibits no mass. There is no tenderness.   Musculoskeletal: She exhibits no edema.   Neurological: She is alert. No cranial nerve deficit. Coordination normal.   Skin: No erythema.   Vitals reviewed.      Assessment:       1. Neck pain    2. Thoracic spine pain    3. Hx of radiation therapy    4. Hx of acute lymphoid leukemia in remission        Plan:       Terri was seen today for neck pain and back pain.    Diagnoses and all orders for this visit:    Neck pain  -     MRI Cervical Spine Without Contrast; Future    Thoracic spine pain  -     MRI Thoracic Spine Without Contrast; Future    Hx of radiation therapy  -     MRI Cervical Spine Without Contrast; Future  -     MRI Thoracic Spine Without Contrast; Future    Hx of acute lymphoid leukemia in remission  -     MRI Cervical Spine Without Contrast; Future  -     MRI Thoracic Spine Without Contrast; Future    Plan with history to proceed with additional imaging continue conservative measures an additional physical therapy encouraged patient plans to  [facility]le on her own at [facility]     Discussed with patient she continue anti-inflammatory medication with precautions discussed and trial of  -     methocarbamol (ROBAXIN) 500 MG Tab; Take 1 tablet (500 mg total) by mouth every 8 (eight) hours as needed (spasm/pain).    Call if no impromvent is

## 2019-10-21 ENCOUNTER — HOSPITAL ENCOUNTER (OUTPATIENT)
Dept: RADIOLOGY | Facility: HOSPITAL | Age: 29
Discharge: HOME | End: 2019-10-21
Attending: INTERNAL MEDICINE
Payer: COMMERCIAL

## 2019-10-21 DIAGNOSIS — Z92.3 HX OF RADIATION THERAPY: ICD-10-CM

## 2019-10-21 DIAGNOSIS — Z85.6 HX OF ACUTE LYMPHOID LEUKEMIA IN REMISSION: ICD-10-CM

## 2019-10-21 DIAGNOSIS — M54.6 THORACIC SPINE PAIN: ICD-10-CM

## 2019-10-21 DIAGNOSIS — M54.2 NECK PAIN: ICD-10-CM

## 2019-10-21 PROCEDURE — 72141 MRI CERVICAL SPINE WITHOUT CONTRAST: ICD-10-PCS | Mod: 26,,, | Performed by: RADIOLOGY

## 2019-10-21 PROCEDURE — 72146 MRI CHEST SPINE W/O DYE: CPT | Mod: 26,,, | Performed by: RADIOLOGY

## 2019-10-21 PROCEDURE — 72146 MRI CHEST SPINE W/O DYE: CPT | Mod: TC

## 2019-10-21 PROCEDURE — 72146 MRI THORACIC SPINE WITHOUT CONTRAST: ICD-10-PCS | Mod: 26,,, | Performed by: RADIOLOGY

## 2019-10-21 PROCEDURE — 72141 MRI NECK SPINE W/O DYE: CPT | Mod: 26,,, | Performed by: RADIOLOGY

## 2019-10-21 PROCEDURE — 72141 MRI NECK SPINE W/O DYE: CPT | Mod: TC

## 2019-10-22 DIAGNOSIS — M47.814 OSTEOARTHRITIS OF THORACIC SPINE, UNSPECIFIED SPINAL OSTEOARTHRITIS COMPLICATION STATUS: ICD-10-CM

## 2019-10-22 DIAGNOSIS — M54.2 NECK PAIN: Primary | ICD-10-CM

## 2019-10-22 DIAGNOSIS — M54.6 THORACIC SPINE PAIN: ICD-10-CM

## 2019-10-22 PROBLEM — M47.24 OSTEOARTHRITIS OF SPINE WITH RADICULOPATHY, THORACIC REGION: Status: ACTIVE | Noted: 2019-10-22

## 2019-12-17 RX ORDER — FLUOXETINE 10 MG/1
10 CAPSULE ORAL DAILY
Qty: 90 CAPSULE | Refills: 0 | Status: SHIPPED | OUTPATIENT
Start: 2019-12-17 | End: 2020-03-23

## 2019-12-29 ENCOUNTER — WALK IN (OUTPATIENT)
Dept: URGENT CARE | Age: 29
End: 2019-12-29

## 2019-12-29 VITALS
TEMPERATURE: 98 F | WEIGHT: 180.23 LBS | HEIGHT: 62 IN | BODY MASS INDEX: 33.17 KG/M2 | OXYGEN SATURATION: 98 % | HEART RATE: 86 BPM | SYSTOLIC BLOOD PRESSURE: 148 MMHG | RESPIRATION RATE: 20 BRPM | DIASTOLIC BLOOD PRESSURE: 82 MMHG

## 2019-12-29 DIAGNOSIS — J40 BRONCHITIS: ICD-10-CM

## 2019-12-29 DIAGNOSIS — H66.91 RIGHT OTITIS MEDIA, UNSPECIFIED OTITIS MEDIA TYPE: Primary | ICD-10-CM

## 2019-12-29 PROCEDURE — 99214 OFFICE O/P EST MOD 30 MIN: CPT | Performed by: PHYSICIAN ASSISTANT

## 2019-12-29 RX ORDER — ALBUTEROL SULFATE 90 UG/1
2 AEROSOL, METERED RESPIRATORY (INHALATION) EVERY 4 HOURS PRN
Qty: 1 INHALER | Refills: 0 | Status: SHIPPED | OUTPATIENT
Start: 2019-12-29 | End: 2020-10-14

## 2019-12-29 RX ORDER — AZITHROMYCIN 250 MG/1
TABLET, FILM COATED ORAL
Qty: 6 TABLET | Refills: 0 | Status: SHIPPED | OUTPATIENT
Start: 2019-12-29 | End: 2020-01-03

## 2019-12-29 RX ORDER — BENZONATATE 100 MG/1
100 CAPSULE ORAL 3 TIMES DAILY PRN
Qty: 20 CAPSULE | Refills: 0 | Status: SHIPPED | OUTPATIENT
Start: 2019-12-29 | End: 2020-10-14

## 2019-12-29 ASSESSMENT — ENCOUNTER SYMPTOMS
ADENOPATHY: 0
COUGH: 1
HEADACHES: 0
SHORTNESS OF BREATH: 1
VOMITING: 0
EYE DISCHARGE: 0
FEVER: 1
SORE THROAT: 0

## 2020-01-08 ENCOUNTER — OFFICE VISIT (OUTPATIENT)
Dept: INTERNAL MEDICINE | Facility: CLINIC | Age: 30
End: 2020-01-08
Payer: COMMERCIAL

## 2020-01-08 VITALS
HEART RATE: 77 BPM | DIASTOLIC BLOOD PRESSURE: 62 MMHG | SYSTOLIC BLOOD PRESSURE: 102 MMHG | WEIGHT: 151.69 LBS | OXYGEN SATURATION: 98 % | BODY MASS INDEX: 26.88 KG/M2 | HEIGHT: 63 IN

## 2020-01-08 DIAGNOSIS — N64.4 BREAST PAIN, LEFT: Primary | ICD-10-CM

## 2020-01-08 DIAGNOSIS — M47.24 OSTEOARTHRITIS OF SPINE WITH RADICULOPATHY, THORACIC REGION: ICD-10-CM

## 2020-01-08 PROCEDURE — 99214 PR OFFICE/OUTPT VISIT, EST, LEVL IV, 30-39 MIN: ICD-10-PCS | Mod: S$GLB,,, | Performed by: INTERNAL MEDICINE

## 2020-01-08 PROCEDURE — 3008F BODY MASS INDEX DOCD: CPT | Mod: CPTII,S$GLB,, | Performed by: INTERNAL MEDICINE

## 2020-01-08 PROCEDURE — 99214 OFFICE O/P EST MOD 30 MIN: CPT | Mod: S$GLB,,, | Performed by: INTERNAL MEDICINE

## 2020-01-08 PROCEDURE — 99999 PR PBB SHADOW E&M-EST. PATIENT-LVL III: ICD-10-PCS | Mod: PBBFAC,,, | Performed by: INTERNAL MEDICINE

## 2020-01-08 PROCEDURE — 3008F PR BODY MASS INDEX (BMI) DOCUMENTED: ICD-10-PCS | Mod: CPTII,S$GLB,, | Performed by: INTERNAL MEDICINE

## 2020-01-08 PROCEDURE — 99999 PR PBB SHADOW E&M-EST. PATIENT-LVL III: CPT | Mod: PBBFAC,,, | Performed by: INTERNAL MEDICINE

## 2020-01-08 RX ORDER — METHOCARBAMOL 500 MG/1
500 TABLET, FILM COATED ORAL EVERY 8 HOURS PRN
Qty: 30 TABLET | Refills: 0
Start: 2020-01-08 | End: 2021-02-26

## 2020-01-08 NOTE — PROGRESS NOTES
Answers for HPI/ROS submitted by the patient on 1/7/2020   activity change: No  unexpected weight change: No  neck pain: Yes  hearing loss: No  rhinorrhea: No  trouble swallowing: No  eye discharge: No  visual disturbance: No  chest tightness: No  wheezing: No  chest pain: Yes  palpitations: No  blood in stool: No  constipation: No  vomiting: No  diarrhea: No  polydipsia: No  polyuria: No  difficulty urinating: No  hematuria: No  menstrual problem: No  dysuria: No  joint swelling: No  arthralgias: No  headaches: No  weakness: No  confusion: No  dysphoric mood: No  Subjective:       Patient ID: Terri Tavera is a 29 y.o. female.    Chief Complaint: Breast Pain   This is a 29 she does not any specific injury is week or she decided that she would change bras and she has been on she did some improvement reports that she did not have any symptoms yesterday but then again today she started she has been having it on off the last few weeks.  She does have breast discomfort on occasion when she has her cycle she has not noticed any lumps or bumps but does have discomfort in the is in her left side when she presses on it or moves in certain positions.  She denies chest pain or shortness of breath she reports that since last visit her upper back pain has improved she did have a small disc in the thoracic region for which she has been done some physical therapy and will use a muscle relaxer on occasion as needed.    HPI  Review of Systems   Constitutional: Negative for activity change and unexpected weight change.   HENT: Negative for hearing loss, rhinorrhea and trouble swallowing.    Eyes: Negative for discharge and visual disturbance.   Respiratory: Negative for chest tightness, shortness of breath and wheezing.    Cardiovascular: Negative for palpitations.   Gastrointestinal: Negative for blood in stool, constipation, diarrhea and vomiting.   Endocrine: Negative for polydipsia and polyuria.   Genitourinary: Negative for  difficulty urinating, dysuria, hematuria and menstrual problem.   Musculoskeletal: Positive for back pain and neck pain. Negative for arthralgias and joint swelling.        Left breast pain     Back pain improved    Neurological: Negative for weakness and headaches.   Psychiatric/Behavioral: Negative for confusion and dysphoric mood.       Objective:      Physical Exam   Constitutional: No distress.   HENT:   Head: Normocephalic.   Mouth/Throat: Oropharynx is clear and moist.   Eyes: No scleral icterus.   Neck: Neck supple.   Cardiovascular: Normal rate, regular rhythm and normal heart sounds. Exam reveals no gallop and no friction rub.   No murmur heard.  Pulmonary/Chest: Effort normal and breath sounds normal. No respiratory distress.   Some discomfort left breast to palpation   sria   No rashes    Abdominal: Soft. Bowel sounds are normal. She exhibits no mass. There is no tenderness.   Musculoskeletal: She exhibits no edema.   Neurological: She is alert.   Skin: No erythema.   Psychiatric: She has a normal mood and affect.   Vitals reviewed.      Assessment:       1. Breast pain, left    2. Osteoarthritis of spine with radiculopathy, thoracic region        Plan:       Terri was seen today for breast pain.    Diagnoses and all orders for this visit:    Breast pain, left  Discussed with patient conservative measures supportive bra line continue without underwire since that has given her some relief she can use short-term anti-inflammatory if needed is especially around the time for cycle we also discussed reduction in caffeine would proceed with further imaging  -     Mammo Digital Diagnostic Left w/ [facility]; Future    For her thoracic back pain and disc she is doing better has done some physical therapy and she has her Robaxin on hand call if refills needed  -     methocarbamol (ROBAXIN) 500 MG Tab; Take 1 tablet (500 mg total) by mouth every 8 (eight) hours as needed (spasm/pain).    Mammogram and review discussed  with pt

## 2020-01-09 ENCOUNTER — HOSPITAL ENCOUNTER (OUTPATIENT)
Dept: RADIOLOGY | Facility: HOSPITAL | Age: 30
Discharge: HOME | End: 2020-01-09
Attending: INTERNAL MEDICINE
Payer: COMMERCIAL

## 2020-01-09 DIAGNOSIS — N64.4 BREAST PAIN, LEFT: ICD-10-CM

## 2020-01-09 PROCEDURE — 76642 US BREAST LEFT LIMITED: ICD-10-PCS | Mod: 26,LT,, | Performed by: RADIOLOGY

## 2020-01-09 PROCEDURE — 76642 ULTRASOUND BREAST LIMITED: CPT | Mod: 26,LT,, | Performed by: RADIOLOGY

## 2020-01-09 PROCEDURE — 77062 BREAST TOMOSYNTHESIS BI: CPT | Mod: 26,,, | Performed by: RADIOLOGY

## 2020-01-09 PROCEDURE — 77066 DX MAMMO INCL CAD BI: CPT | Mod: TC,PO

## 2020-01-09 PROCEDURE — 77062 MAMMO DIGITAL DIAGNOSTIC BILAT WITH TOMOSYNTHESIS_CAD: ICD-10-PCS | Mod: 26,,, | Performed by: RADIOLOGY

## 2020-01-09 PROCEDURE — 77066 DX MAMMO INCL CAD BI: CPT | Mod: 26,,, | Performed by: RADIOLOGY

## 2020-01-09 PROCEDURE — 77066 MAMMO DIGITAL DIAGNOSTIC BILAT WITH TOMOSYNTHESIS_CAD: ICD-10-PCS | Mod: 26,,, | Performed by: RADIOLOGY

## 2020-02-09 ENCOUNTER — PATIENT MESSAGE (OUTPATIENT)
Dept: OBSTETRICS AND GYNECOLOGY | Facility: CLINIC | Age: 30
End: 2020-02-09

## 2020-02-20 ENCOUNTER — TELEPHONE (OUTPATIENT)
Dept: OBSTETRICS AND GYNECOLOGY | Facility: CLINIC | Age: 30
End: 2020-02-20

## 2020-02-20 DIAGNOSIS — Z30.433 ENCOUNTER FOR REMOVAL AND REINSERTION OF INTRAUTERINE CONTRACEPTIVE DEVICE (IUD): Primary | ICD-10-CM

## 2020-03-04 ENCOUNTER — PATIENT MESSAGE (OUTPATIENT)
Dept: OBSTETRICS AND GYNECOLOGY | Facility: CLINIC | Age: 30
End: 2020-03-04

## 2020-03-09 ENCOUNTER — OFFICE VISIT (OUTPATIENT)
Dept: URGENT CARE | Facility: CLINIC | Age: 30
End: 2020-03-09
Payer: COMMERCIAL

## 2020-03-09 VITALS
RESPIRATION RATE: 18 BRPM | OXYGEN SATURATION: 99 % | HEART RATE: 77 BPM | HEIGHT: 63 IN | DIASTOLIC BLOOD PRESSURE: 74 MMHG | SYSTOLIC BLOOD PRESSURE: 111 MMHG | BODY MASS INDEX: 26.75 KG/M2 | WEIGHT: 151 LBS | TEMPERATURE: 98 F

## 2020-03-09 DIAGNOSIS — B00.1 RECURRENT COLD SORES: Primary | ICD-10-CM

## 2020-03-09 PROCEDURE — 99214 PR OFFICE/OUTPT VISIT, EST, LEVL IV, 30-39 MIN: ICD-10-PCS | Mod: S$GLB,,, | Performed by: NURSE PRACTITIONER

## 2020-03-09 PROCEDURE — 99214 OFFICE O/P EST MOD 30 MIN: CPT | Mod: S$GLB,,, | Performed by: NURSE PRACTITIONER

## 2020-03-09 RX ORDER — VALACYCLOVIR HYDROCHLORIDE 1 G/1
2000 TABLET, FILM COATED ORAL 2 TIMES DAILY
Qty: 4 TABLET | Refills: 0 | Status: SHIPPED | OUTPATIENT
Start: 2020-03-09 | End: 2020-07-28

## 2020-03-09 NOTE — PROGRESS NOTES
"Subjective:       Patient ID: Terri Tavera is a 30 y.o. female.    Vitals:  height is 5' 3" (1.6 m) and weight is 68.5 kg (151 lb). Her temperature is 98 °F (36.7 °C). Her blood pressure is 111/74 and her pulse is 77. Her respiration is 18 and oxygen saturation is 99%.     Chief Complaint: Blister (upper LIP 2 DAYS NOW )    Patient presents with recurrent cold sore to left upper lip and lower towards corner.  Came on two weeks ago and resolved then came back yesterday.  Used otc meds with no relief.  She is not on suppressive therapy.  Denies fever or chills or URI symptoms.      Other   This is a recurrent (BLISTER (PT HAD SAME PROBLEM 2 WEEKS AGO) problem. The current episode started yesterday. The problem has been gradually worsening. Pertinent negatives include no abdominal pain, anorexia, arthralgias, change in bowel habit, chest pain, chills, congestion, coughing, diaphoresis, fatigue, fever, headaches, joint swelling, myalgias, nausea, neck pain, numbness, rash, sore throat, swollen glands, urinary symptoms, vertigo, visual change, vomiting or weakness. Nothing aggravates the symptoms. Treatments tried: OTC FEVER BLISTER CREAM  The treatment provided no relief.       Constitution: Negative for chills, sweating, fatigue and fever.   HENT: Negative for congestion and sore throat.    Neck: Negative for neck pain and painful lymph nodes.   Cardiovascular: Negative for chest pain and leg swelling.   Eyes: Negative for double vision and blurred vision.   Respiratory: Negative for cough and shortness of breath.    Gastrointestinal: Negative for abdominal pain, nausea, vomiting and diarrhea.   Genitourinary: Negative for dysuria, frequency, urgency and history of kidney stones.   Musculoskeletal: Negative for joint pain, joint swelling, muscle cramps and muscle ache.   Skin: Positive for lesion. Negative for color change, pale, rash and bruising.   Allergic/Immunologic: Negative for seasonal allergies. "   Neurological: Negative for dizziness, history of vertigo, light-headedness, passing out, headaches and numbness.   Hematologic/Lymphatic: Negative for swollen lymph nodes.   Psychiatric/Behavioral: Negative for nervous/anxious, sleep disturbance and depression. The patient is not nervous/anxious.        Objective:      Physical Exam   Constitutional: She is oriented to person, place, and time. She appears well-developed and well-nourished. She is cooperative.  Non-toxic appearance. She does not have a sickly appearance. She does not appear ill. No distress.   HENT:   Head: Normocephalic and atraumatic.   Right Ear: Hearing, tympanic membrane, external ear and ear canal normal.   Left Ear: Hearing, tympanic membrane, external ear and ear canal normal.   Nose: Nose normal. No mucosal edema, rhinorrhea or nasal deformity. No epistaxis. Right sinus exhibits no maxillary sinus tenderness and no frontal sinus tenderness. Left sinus exhibits no maxillary sinus tenderness and no frontal sinus tenderness.   Mouth/Throat: Uvula is midline, oropharynx is clear and moist and mucous membranes are normal. Oral lesions present. No trismus in the jaw. Normal dentition. No uvula swelling. No oropharyngeal exudate, posterior oropharyngeal edema or posterior oropharyngeal erythema.       Eyes: Conjunctivae and lids are normal. No scleral icterus.   Neck: Trachea normal, full passive range of motion without pain and phonation normal. Neck supple. No neck rigidity. No edema and no erythema present.   Cardiovascular: Normal rate, regular rhythm, normal heart sounds, intact distal pulses and normal pulses.   Pulmonary/Chest: Effort normal and breath sounds normal. No respiratory distress. She has no decreased breath sounds. She has no rhonchi.   Abdominal: Normal appearance.   Musculoskeletal: Normal range of motion. She exhibits no edema or deformity.   Neurological: She is alert and oriented to person, place, and time. She exhibits  normal muscle tone. Coordination normal.   Skin: Skin is warm, dry, intact, not diaphoretic and not pale.   Psychiatric: She has a normal mood and affect. Her speech is normal and behavior is normal. Judgment and thought content normal. Cognition and memory are normal.   Nursing note and vitals reviewed.        Assessment:       1. Recurrent cold sores        Plan:         Recurrent cold sores  -     valACYclovir (VALTREX) 1000 MG tablet; Take 2 tablets (2,000 mg total) by mouth 2 (two) times daily. for 1 day  Dispense: 4 tablet; Refill: 0      Patient Instructions     Valtrex as directed.  See handout sheet.  Follow up with your PCP if symptoms persist or return to discuss suppressive therapy as discussed today.  Return here or go to the ER for worsening symptoms.  Herpes: Caring for Sores  Good hygiene matters when you have herpes. Take care of your sores to speed healing. Neglected sores can lead to other infections.     Warm baths can ease itching and burning caused by sores.   To ease your symptoms  · Take any medicines as directed.  · Take acetaminophen or ibuprofen for pain.  · Take warm or cool baths to relieve itching of sores. And dont share towels when you have a sore.  · Urinate in a tub of warm water to prevent burning. This works for women with genital herpes.  · Dont wear tight clothes or nylon underwear. They can trap moisture, cause chafing, and prevent sores from healing.  To speed healing  · Wash the sores with mild soap and water. And wash your hands after you touch a sore.  · Dry the affected area completely by patting a towel over it. Don't rub. Don't share towels.  · Dont bandage sores. The dry air helps them heal.  · Avoid ointments unless they are prescribed. They retain moisture and may cause other infections.  · Dont pick at the sores. This can slow healing.  · Dont touch your eyes when you have a sore. The virus may travel from your fingertips to your eyes.  Resources  [employer] STI Hotline  124.223.3220  www.[place] Incd.org  Centers for Disease Control and Prevention  588.329.4780  www.cdc.gov/std   Date Last Reviewed: 2017 [employer]. [address] [place] [address]. All rights reserved. This information is not intended as a substitute for professional medical care. Always follow your healthcare professional's instructions.        Understanding Cold Sores  Cold sores are small blisters or sores on the lip or sometimes inside the mouth. Many people get them from time to time. Cold sores usually are not serious, and they usually heal in a week or two. They are caused by 2 related viruses, herpes simplex type 1 and 2. These viruses spread very easily. Many people have one or both of these viruses in their body. More than 4 in every 5 people are infected with herpes simplex type 1. Once you have the virus that causes cold sores, it stays in your body for the rest of your life. But it can be inactive for long periods.  What causes a cold sore?  Cold sores are usually caused by herpes simplex virus type 1. Less often, they are caused by herpes simplex virus type 2. Herpes simplex virus type 2 is the more common cause of genital sores. The herpes viruses can enter the body through a break in the skin such as a scrape. Or they may enter through mucous membranes such as the lips or mouth. Some ways to get the viruses include:  · Kissing someone who has a cold sore  · Sharing a drinking glass, eating utensils, or lip balm with someone who has a cold sore  · Having oral sex with someone who has a cold sore  A  baby can also get the infection at birth.  If you have a herpes virus, you can to pass it along even when you dont have a sore.  Cold sores flare up occasionally. Things that can cause an outbreak include:  · Sun exposure  · Fever  · Stress or exhaustion  · Menstruation  · Skin irritation  · Another unrelated Illness such  as pneumonia, urinary infection, or cancer  What are the symptoms of a cold sore?  Symptoms can include:  · A blister-like sore or cluster of sores. These often occur at the edge of the lips but may appear inside the mouth.  · Skin redness around the sores.  · Pain or itching in the area of the outbreak. Often the pain or itching develops 12 to 24 hours before the sore become visible.  · Flu-like symptoms, including swollen glands, headache, body ache, or fever. These typically occur only at the time of the first infection.  Cold sores may also occur on fingers. They may rarely infect the eyes, a serious possible complication.  Some people have symptoms a day or two before an outbreak. They may feel tenderness, burning, itching, or tingling before a cold sore appears. Cold sores tend to come back in the same area that they first appeared.  How are cold sores treated?  Treatment for cold sores focuses on relieving and shortening symptoms. For people with frequent outbreaks, treatment works to decrease how often future episodes.  Treatments may include:  · Prescription or over-the-counter pain medicines. These can help with discomfort, especially if sores are inside the mouth.  · Antiviral medicines. These may be pills that are taken by mouth or a cream to apply to sores. They may help shorten an outbreak and reduce the severity of symptoms.  They may be used to help prevent future outbreaks if you have disabling recurrent infections.  · Self-care such as extra rest and drinking more fluids. These may help relieve the flu-like symptoms of a first outbreak.  How are cold sores diagnosed?  Your healthcare provider makes the diagnosis mainly by looking at the sores and doing a clinical exam.  This may be confirmed by swab tests or blood tests.  How can I prevent cold sores?  You can help reduce the spread of the herpes viruses that cause cold sores. This can help both you and others avoid getting cold sores. Follow these  tips:  · Do not kiss others if you have a cold sore. Also avoid kissing someone with a cold sore.  · Do not share eating utensils, lip balm, razors, or towels with someone who has a cold sore.  · Wash your hands after touching the area of a cold sore. The herpes virus can be carried from your face to your hands when you touch the area of a cold sore. When this happens, wash your hands thoroughly, for at least 20 seconds. When you cant wash with soap and water, use an alcohol-based hand .  · Disinfect things you touch often, such as phones and keyboards.    · If you feel a cold sore coming on, do the same things you would do when a cold sore is present to avoid spreading the virus.  · Use condoms to help prevent passing on the viruses through sex.  What are the possible complications of a cold sore?  Cold sores usually go away by themselves within 2 weeks. For most people cold sores are not serious. The viruses that cause cold sores can cause more serious illness, though. People who have a weak immune system may get more serious infections from herpes viruses. These include people being treated for cancer or who have HIV disease. Babies may also become very ill from a herpes infection.      When should I call my healthcare provider?  Call your healthcare provider right away if you have any of these:  · Fever of 100.4°F (38°C) or higher, or as directed  · Pain that gets worse  · You cannot eat or drink because of painful sores  · Symptoms dont get better within 5 to 7 days  · Blisters spread beyond the mouth or lip to areas on the chest, arms, face, or legs   Date Last Reviewed: 3/28/2016  © 2165-6052 [employer]. [address] [place] [address], [place] [address]. All rights reserved. This information is not intended as a substitute for professional medical care. Always follow your healthcare professional's instructions.

## 2020-03-09 NOTE — PATIENT INSTRUCTIONS
Valtrex as directed.  See handout sheet.  Follow up with your PCP if symptoms persist or return to discuss suppressive therapy as discussed today.  Return here or go to the ER for worsening symptoms.  Herpes: Caring for Sores  Good hygiene matters when you have herpes. Take care of your sores to speed healing. Neglected sores can lead to other infections.     Warm baths can ease itching and burning caused by sores.   To ease your symptoms  · Take any medicines as directed.  · Take acetaminophen or ibuprofen for pain.  · Take warm or cool baths to relieve itching of sores. And dont share towels when you have a sore.  · Urinate in a tub of warm water to prevent burning. This works for women with genital herpes.  · Dont wear tight clothes or nylon underwear. They can trap moisture, cause chafing, and prevent sores from healing.  To speed healing  · Wash the sores with mild soap and water. And wash your hands after you touch a sore.  · Dry the affected area completely by patting a towel over it. Don't rub. Don't share towels.  · Dont bandage sores. The dry air helps them heal.  · Avoid ointments unless they are prescribed. They retain moisture and may cause other infections.  · Dont pick at the sores. This can slow healing.  · Dont touch your eyes when you have a sore. The virus may travel from your fingertips to your eyes.  Resources  [employer] STI Hotline  838.438.9639  www.ashastd.org  Centers for Disease Control and Prevention  535.100.3997  www.cdc.gov/std   Date Last Reviewed: 1/1/2017 © 2000-2017 [employer]. [address] [place] [address], [place] [address]. All rights reserved. This information is not intended as a substitute for professional medical care. Always follow your healthcare professional's instructions.        Understanding Cold Sores  Cold sores are small blisters or sores on the lip or sometimes inside the mouth. Many people get them from time to time. Cold  sores usually are not serious, and they usually heal in a week or two. They are caused by 2 related viruses, herpes simplex type 1 and 2. These viruses spread very easily. Many people have one or both of these viruses in their body. More than 4 in every 5 people are infected with herpes simplex type 1. Once you have the virus that causes cold sores, it stays in your body for the rest of your life. But it can be inactive for long periods.  What causes a cold sore?  Cold sores are usually caused by herpes simplex virus type 1. Less often, they are caused by herpes simplex virus type 2. Herpes simplex virus type 2 is the more common cause of genital sores. The herpes viruses can enter the body through a break in the skin such as a scrape. Or they may enter through mucous membranes such as the lips or mouth. Some ways to get the viruses include:  · Kissing someone who has a cold sore  · Sharing a drinking glass, eating utensils, or lip balm with someone who has a cold sore  · Having oral sex with someone who has a cold sore  A  baby can also get the infection at birth.  If you have a herpes virus, you can to pass it along even when you dont have a sore.  Cold sores flare up occasionally. Things that can cause an outbreak include:  · Sun exposure  · Fever  · Stress or exhaustion  · Menstruation  · Skin irritation  · Another unrelated Illness such as pneumonia, urinary infection, or cancer  What are the symptoms of a cold sore?  Symptoms can include:  · A blister-like sore or cluster of sores. These often occur at the edge of the lips but may appear inside the mouth.  · Skin redness around the sores.  · Pain or itching in the area of the outbreak. Often the pain or itching develops 12 to 24 hours before the sore become visible.  · Flu-like symptoms, including swollen glands, headache, body ache, or fever. These typically occur only at the time of the first infection.  Cold sores may also occur on fingers. They may  rarely infect the eyes, a serious possible complication.  Some people have symptoms a day or two before an outbreak. They may feel tenderness, burning, itching, or tingling before a cold sore appears. Cold sores tend to come back in the same area that they first appeared.  How are cold sores treated?  Treatment for cold sores focuses on relieving and shortening symptoms. For people with frequent outbreaks, treatment works to decrease how often future episodes.  Treatments may include:  · Prescription or over-the-counter pain medicines. These can help with discomfort, especially if sores are inside the mouth.  · Antiviral medicines. These may be pills that are taken by mouth or a cream to apply to sores. They may help shorten an outbreak and reduce the severity of symptoms.  They may be used to help prevent future outbreaks if you have disabling recurrent infections.  · Self-care such as extra rest and drinking more fluids. These may help relieve the flu-like symptoms of a first outbreak.  How are cold sores diagnosed?  Your healthcare provider makes the diagnosis mainly by looking at the sores and doing a clinical exam.  This may be confirmed by swab tests or blood tests.  How can I prevent cold sores?  You can help reduce the spread of the herpes viruses that cause cold sores. This can help both you and others avoid getting cold sores. Follow these tips:  · Do not kiss others if you have a cold sore. Also avoid kissing someone with a cold sore.  · Do not share eating utensils, lip balm, razors, or towels with someone who has a cold sore.  · Wash your hands after touching the area of a cold sore. The herpes virus can be carried from your face to your hands when you touch the area of a cold sore. When this happens, wash your hands thoroughly, for at least 20 seconds. When you cant wash with soap and water, use an alcohol-based hand .  · Disinfect things you touch often, such as phones and keyboards.    · If  you feel a cold sore coming on, do the same things you would do when a cold sore is present to avoid spreading the virus.  · Use condoms to help prevent passing on the viruses through sex.  What are the possible complications of a cold sore?  Cold sores usually go away by themselves within 2 weeks. For most people cold sores are not serious. The viruses that cause cold sores can cause more serious illness, though. People who have a weak immune system may get more serious infections from herpes viruses. These include people being treated for cancer or who have HIV disease. Babies may also become very ill from a herpes infection.      When should I call my healthcare provider?  Call your healthcare provider right away if you have any of these:  · Fever of 100.4°F (38°C) or higher, or as directed  · Pain that gets worse  · You cannot eat or drink because of painful sores  · Symptoms dont get better within 5 to 7 days  · Blisters spread beyond the mouth or lip to areas on the chest, arms, face, or legs   Date Last Reviewed: 3/28/2016  © 2614-1517 [employer]. [address] [place] [address], [place] [address]. All rights reserved. This information is not intended as a substitute for professional medical care. Always follow your healthcare professional's instructions.

## 2020-03-19 ENCOUNTER — PATIENT MESSAGE (OUTPATIENT)
Dept: OBSTETRICS AND GYNECOLOGY | Facility: CLINIC | Age: 30
End: 2020-03-19

## 2020-03-23 RX ORDER — FLUOXETINE 10 MG/1
10 CAPSULE ORAL DAILY
Qty: 90 CAPSULE | Refills: 0 | Status: SHIPPED | OUTPATIENT
Start: 2020-03-23 | End: 2020-06-05 | Stop reason: SDUPTHER

## 2020-03-30 ENCOUNTER — E-ADVICE (OUTPATIENT)
Dept: INTERNAL MEDICINE | Age: 30
End: 2020-03-30

## 2020-03-30 DIAGNOSIS — K21.9 GASTROESOPHAGEAL REFLUX DISEASE, ESOPHAGITIS PRESENCE NOT SPECIFIED: ICD-10-CM

## 2020-03-30 DIAGNOSIS — R12 HEARTBURN: Primary | ICD-10-CM

## 2020-03-30 RX ORDER — PANTOPRAZOLE SODIUM 40 MG/1
40 TABLET, DELAYED RELEASE ORAL DAILY
Qty: 90 TABLET | Refills: 0 | Status: SHIPPED | OUTPATIENT
Start: 2020-03-30 | End: 2020-06-25

## 2020-04-01 ENCOUNTER — TELEPHONE (OUTPATIENT)
Dept: ADMISSION | Age: 30
End: 2020-04-01

## 2020-04-02 ENCOUNTER — CLINICAL SUPPORT (OUTPATIENT)
Dept: INTERNAL MEDICINE | Facility: CLINIC | Age: 30
End: 2020-04-02
Payer: COMMERCIAL

## 2020-04-02 DIAGNOSIS — R05.9 COUGH: Primary | ICD-10-CM

## 2020-04-02 DIAGNOSIS — R05.9 COUGH: ICD-10-CM

## 2020-04-02 DIAGNOSIS — R07.89 CHEST TIGHTNESS: ICD-10-CM

## 2020-04-02 DIAGNOSIS — R19.7 DIARRHEA, UNSPECIFIED TYPE: ICD-10-CM

## 2020-04-02 PROCEDURE — U0002 COVID-19 LAB TEST NON-CDC: HCPCS

## 2020-04-03 ENCOUNTER — TELEPHONE (OUTPATIENT)
Dept: OBSTETRICS AND GYNECOLOGY | Facility: CLINIC | Age: 30
End: 2020-04-03

## 2020-04-03 ENCOUNTER — TELEPHONE (OUTPATIENT)
Dept: FAMILY MEDICINE | Facility: CLINIC | Age: 30
End: 2020-04-03

## 2020-04-03 LAB — SARS-COV-2 RNA RESP QL NAA+PROBE: NOT DETECTED

## 2020-04-03 NOTE — TELEPHONE ENCOUNTER
----- Message from Juan Weiner sent at 4/3/2020  8:59 AM CDT -----  Contact: Patient  Type:  Patient Returning Call    Who Called:  Patient  Who Left Message for Patient:  unknown  Does the patient know what this is regarding?:  Unknown, no note/message, may be incorrect office  Best Call Back Number:  [phone]  Additional Information:  NA

## 2020-05-27 ENCOUNTER — TELEPHONE (OUTPATIENT)
Dept: GASTROENTEROLOGY | Age: 30
End: 2020-05-27

## 2020-06-02 ENCOUNTER — APPOINTMENT (OUTPATIENT)
Dept: GASTROENTEROLOGY | Age: 30
End: 2020-06-02
Attending: INTERNAL MEDICINE

## 2020-06-05 RX ORDER — FLUOXETINE 10 MG/1
10 CAPSULE ORAL DAILY
Qty: 14 CAPSULE | Refills: 0 | Status: SHIPPED | OUTPATIENT
Start: 2020-06-05 | End: 2020-06-25

## 2020-06-19 ENCOUNTER — PATIENT MESSAGE (OUTPATIENT)
Dept: OBSTETRICS AND GYNECOLOGY | Facility: CLINIC | Age: 30
End: 2020-06-19

## 2020-06-19 DIAGNOSIS — Z30.433 ENCOUNTER FOR REMOVAL AND REINSERTION OF INTRAUTERINE CONTRACEPTIVE DEVICE (IUD): Primary | ICD-10-CM

## 2020-06-23 ENCOUNTER — TELEPHONE (OUTPATIENT)
Dept: OBSTETRICS AND GYNECOLOGY | Facility: CLINIC | Age: 30
End: 2020-06-23

## 2020-06-23 DIAGNOSIS — Z30.433 ENCOUNTER FOR REMOVAL AND REINSERTION OF INTRAUTERINE CONTRACEPTIVE DEVICE (IUD): Primary | ICD-10-CM

## 2020-06-23 NOTE — TELEPHONE ENCOUNTER
----- Message from Greg Stock sent at 6/23/2020  2:12 PM CDT -----  Patient is calling in to speak w/ staff in regards to removal and replacement of IUD. Patient contact number [phone]

## 2020-06-25 DIAGNOSIS — R12 HEARTBURN: ICD-10-CM

## 2020-06-25 RX ORDER — FLUOXETINE 10 MG/1
10 CAPSULE ORAL DAILY
Qty: 90 CAPSULE | Refills: 0 | Status: SHIPPED | OUTPATIENT
Start: 2020-06-25 | End: 2020-09-24

## 2020-06-25 RX ORDER — PANTOPRAZOLE SODIUM 40 MG/1
TABLET, DELAYED RELEASE ORAL
Qty: 90 TABLET | Refills: 0 | Status: SHIPPED | OUTPATIENT
Start: 2020-06-25 | End: 2021-09-30 | Stop reason: ALTCHOICE

## 2020-07-24 ENCOUNTER — NURSE TRIAGE (OUTPATIENT)
Dept: ADMINISTRATIVE | Facility: CLINIC | Age: 30
End: 2020-07-24

## 2020-07-24 NOTE — TELEPHONE ENCOUNTER
Pt states that she was exposed to COVID + person while working. Pt has no symptoms at this time. Pt has questions about testing.  Pt advise per protocol and verbalized understanding.    Reason for Disposition   [1] COVID-19 exposure AND [2] NO symptoms   [1] COVID-19 EXPOSURE (Close Contact) within last 14 days AND [2] needs COVID-19 lab test to return to work AND [3] NO symptoms    Additional Information   Negative: SEVERE difficulty breathing (e.g., struggling for each breath, speaks in single words)   Negative: Difficult to awaken or acting confused (e.g., disoriented, slurred speech)   Negative: Bluish (or gray) lips or face now   Negative: Shock suspected (e.g., cold/pale/clammy skin, too weak to stand, low BP, rapid pulse)   Negative: Sounds like a life-threatening emergency to the triager   Negative: COVID-19 has been diagnosed by a healthcare provider (HCP)   Negative: COVID-19 lab test positive   Negative: [1] Symptoms of COVID-19 (e.g., cough, fever, SOB, or others) AND [2] lives in an area with community spread   Negative: [1] Symptoms of COVID-19 (e.g., cough, fever, SOB, or others) AND [2] within 14 days of EXPOSURE (close contact) with diagnosed or suspected COVID-19 patient   Negative: [1] Symptoms of COVID-19 (e.g., cough, fever, SOB, or others) AND [2] within 14 days of travel from high-risk area for COVID-19 community spread (identified by CDC)   Negative: [1] Difficulty breathing (shortness of breath) occurs AND [2] onset > 14 days after COVID-19 EXPOSURE (Close Contact) AND [3] no community spread where patient lives   Negative: [1] Dry cough occurs AND [2] onset > 14 days after COVID-19 EXPOSURE AND [3] no community spread where patient lives   Negative: [1] Wet cough (i.e., white-yellow, yellow, green, or kinza colored sputum) AND [2] onset > 14 days after COVID-19 EXPOSURE AND [3] no community spread where patient lives   Negative: [1] Common cold symptoms AND [2] onset > 14  days after COVID-19 EXPOSURE AND [3] no community spread where patient lives    Protocols used: CORONAVIRUS (COVID-19) DIAGNOSED OR [id number], CORONAVIRUS (COVID-19) EXPOSURE-A-AH

## 2020-07-28 ENCOUNTER — PROCEDURE VISIT (OUTPATIENT)
Dept: OBSTETRICS AND GYNECOLOGY | Facility: CLINIC | Age: 30
End: 2020-07-28
Payer: COMMERCIAL

## 2020-07-28 VITALS — DIASTOLIC BLOOD PRESSURE: 72 MMHG | SYSTOLIC BLOOD PRESSURE: 104 MMHG

## 2020-07-28 DIAGNOSIS — Z30.433 ENCOUNTER FOR REMOVAL AND REINSERTION OF INTRAUTERINE CONTRACEPTIVE DEVICE (IUD): Primary | ICD-10-CM

## 2020-07-28 LAB
B-HCG UR QL: NEGATIVE
CTP QC/QA: YES

## 2020-07-28 PROCEDURE — 58301 PR REMOVE, INTRAUTERINE DEVICE: ICD-10-PCS | Mod: S$GLB,,, | Performed by: OBSTETRICS & GYNECOLOGY

## 2020-07-28 PROCEDURE — 58300 PR INSERT INTRAUTERINE DEVICE: ICD-10-PCS | Mod: 59,S$GLB,, | Performed by: OBSTETRICS & GYNECOLOGY

## 2020-07-28 PROCEDURE — 58301 REMOVE INTRAUTERINE DEVICE: CPT | Mod: S$GLB,,, | Performed by: OBSTETRICS & GYNECOLOGY

## 2020-07-28 PROCEDURE — 58300 INSERT INTRAUTERINE DEVICE: CPT | Mod: 59,S$GLB,, | Performed by: OBSTETRICS & GYNECOLOGY

## 2020-07-28 RX ORDER — CITALOPRAM 40 MG/1
40 TABLET, FILM COATED ORAL DAILY
COMMUNITY
Start: 2020-04-02

## 2020-07-28 RX ORDER — ALPRAZOLAM 0.25 MG/1
0.25 TABLET ORAL
COMMUNITY
Start: 2020-04-02 | End: 2022-06-30

## 2020-07-28 RX ORDER — DULOXETIN HYDROCHLORIDE 20 MG/1
20 CAPSULE, DELAYED RELEASE ORAL DAILY
COMMUNITY
Start: 2020-07-09 | End: 2023-02-27

## 2020-07-28 NOTE — PROCEDURES
Procedures     DATE: 2020    TIME: 10:00 AM    PROCEDURES:      1. Belkys removal   2. Mirena insertion    INDICATION: Terri Tavera is a 30 y.o. female who presents for removal of  IUD and replacement of a new IUD.      PATIENT CONSENT: She was counseled on the risks, benefits, indications, and alternatives to IUD use. The patient was advised of minimal risks of bleeding and pain associated with IUD removal and she agrees to proceed. She understands that with IUD insertion there is a risk of bleeding, infection, and uterine perforation.  All questions were answered and consents signed.     LABS: UPT is negative.     Cervical cultures were not performed.    ANESTHESIA: NONE    PROCEDURE NOTE:  *TIME OUT PERFORMED.*    The cervix was visualized with a speculum.  IUD strings were  visualized at the os. IUD removed with gentle traction.      A single tooth tenaculum was placed on the anterior lip of the cervix.  The uterus sounds to 7cm using sterile technique.  A Mirena was loaded and placed high in the uterine fundus without difficulty using sterile technique.  The string was was then cut.  The tenaculum and speculum were removed.    COMPLICATIONS: None    PATIENT DISPOSITION: The patient tolerated the procedure well.    ASSESSMENT:  1. Contraceptive Management / Removal IUD. V25.0.  2. Contraceptive Management/IUD insertion. V25.0    Post IUD placement counseling:  Manage post IUD placement pain with NSAIDS, Tylenol or Rx per [facility].  IUD danger signs and how to check for strings were discussed.  The IUD needs to be removed in 5 years for Mirena and 10 years for Copper IUD.    Follow up:  With me in 4 weeks for string check    Counseling lasted approximately 15 minutes and all her questions were answered.    Chiara Olivarez MD 2020 1:16 PM

## 2020-09-24 RX ORDER — FLUOXETINE 10 MG/1
10 CAPSULE ORAL DAILY
Qty: 30 CAPSULE | Refills: 0 | Status: SHIPPED | OUTPATIENT
Start: 2020-09-24 | End: 2020-10-22 | Stop reason: SDUPTHER

## 2020-10-14 ENCOUNTER — OFFICE VISIT (OUTPATIENT)
Dept: OBGYN | Age: 30
End: 2020-10-14

## 2020-10-14 ENCOUNTER — LAB SERVICES (OUTPATIENT)
Dept: LAB | Age: 30
End: 2020-10-14

## 2020-10-14 VITALS
BODY MASS INDEX: 34.23 KG/M2 | WEIGHT: 186 LBS | SYSTOLIC BLOOD PRESSURE: 126 MMHG | DIASTOLIC BLOOD PRESSURE: 82 MMHG | HEIGHT: 62 IN

## 2020-10-14 DIAGNOSIS — Z30.42 DEPO-PROVERA CONTRACEPTIVE STATUS: ICD-10-CM

## 2020-10-14 DIAGNOSIS — Z01.419 WELL WOMAN EXAM WITH ROUTINE GYNECOLOGICAL EXAM: Primary | ICD-10-CM

## 2020-10-14 DIAGNOSIS — Z11.3 SCREENING FOR VENEREAL DISEASE (VD): ICD-10-CM

## 2020-10-14 DIAGNOSIS — Z71.7 ENCOUNTER FOR HIV COUNSELING: ICD-10-CM

## 2020-10-14 DIAGNOSIS — Z12.4 SCREENING FOR MALIGNANT NEOPLASM OF CERVIX: ICD-10-CM

## 2020-10-14 LAB
HBV SURFACE AG SER QL: NEGATIVE
HCV AB SER QL: NEGATIVE
HIV 1+2 AB+HIV1 P24 AG SERPL QL IA: NONREACTIVE

## 2020-10-14 PROCEDURE — 87389 HIV-1 AG W/HIV-1&-2 AB AG IA: CPT | Performed by: INTERNAL MEDICINE

## 2020-10-14 PROCEDURE — 87491 CHLMYD TRACH DNA AMP PROBE: CPT | Performed by: INTERNAL MEDICINE

## 2020-10-14 PROCEDURE — 87624 HPV HI-RISK TYP POOLED RSLT: CPT | Performed by: INTERNAL MEDICINE

## 2020-10-14 PROCEDURE — 99385 PREV VISIT NEW AGE 18-39: CPT | Performed by: OBSTETRICS & GYNECOLOGY

## 2020-10-14 PROCEDURE — 96372 THER/PROPH/DIAG INJ SC/IM: CPT | Performed by: OBSTETRICS & GYNECOLOGY

## 2020-10-14 PROCEDURE — 87340 HEPATITIS B SURFACE AG IA: CPT | Performed by: INTERNAL MEDICINE

## 2020-10-14 PROCEDURE — 87661 TRICHOMONAS VAGINALIS AMPLIF: CPT | Performed by: INTERNAL MEDICINE

## 2020-10-14 PROCEDURE — 88175 CYTOPATH C/V AUTO FLUID REDO: CPT | Performed by: PATHOLOGY

## 2020-10-14 PROCEDURE — 87591 N.GONORRHOEAE DNA AMP PROB: CPT | Performed by: INTERNAL MEDICINE

## 2020-10-14 PROCEDURE — 36415 COLL VENOUS BLD VENIPUNCTURE: CPT | Performed by: INTERNAL MEDICINE

## 2020-10-14 PROCEDURE — 86592 SYPHILIS TEST NON-TREP QUAL: CPT | Performed by: INTERNAL MEDICINE

## 2020-10-14 PROCEDURE — 86803 HEPATITIS C AB TEST: CPT | Performed by: INTERNAL MEDICINE

## 2020-10-14 RX ORDER — MEDROXYPROGESTERONE ACETATE 150 MG/ML
150 INJECTION, SUSPENSION INTRAMUSCULAR ONCE
Status: COMPLETED | OUTPATIENT
Start: 2020-10-14 | End: 2020-10-14

## 2020-10-14 RX ADMIN — MEDROXYPROGESTERONE ACETATE 150 MG: 150 INJECTION, SUSPENSION INTRAMUSCULAR at 15:21

## 2020-10-15 LAB — RPR SER QL: NONREACTIVE

## 2020-10-16 LAB
C TRACH RRNA CVX QL NAA+PROBE: NEGATIVE
Lab: NORMAL
N GONORRHOEA RRNA CVX QL NAA+PROBE: NEGATIVE
T VAGINALIS RRNA CVX QL NAA+PROBE: NEGATIVE

## 2020-10-22 ENCOUNTER — OFFICE VISIT (OUTPATIENT)
Dept: INTERNAL MEDICINE | Age: 30
End: 2020-10-22

## 2020-10-22 VITALS
BODY MASS INDEX: 33.86 KG/M2 | HEART RATE: 66 BPM | WEIGHT: 184 LBS | HEIGHT: 62 IN | OXYGEN SATURATION: 99 % | DIASTOLIC BLOOD PRESSURE: 74 MMHG | SYSTOLIC BLOOD PRESSURE: 110 MMHG

## 2020-10-22 DIAGNOSIS — F41.9 ANXIETY: ICD-10-CM

## 2020-10-22 DIAGNOSIS — Z23 NEED FOR VACCINATION: Primary | ICD-10-CM

## 2020-10-22 DIAGNOSIS — K50.019 CROHN'S DISEASE OF SMALL INTESTINE WITH COMPLICATION (CMD): ICD-10-CM

## 2020-10-22 DIAGNOSIS — Z00.00 ANNUAL PHYSICAL EXAM: ICD-10-CM

## 2020-10-22 DIAGNOSIS — R12 HEARTBURN: ICD-10-CM

## 2020-10-22 PROCEDURE — 99395 PREV VISIT EST AGE 18-39: CPT | Performed by: INTERNAL MEDICINE

## 2020-10-22 PROCEDURE — 90715 TDAP VACCINE 7 YRS/> IM: CPT | Performed by: INTERNAL MEDICINE

## 2020-10-22 PROCEDURE — 90471 IMMUNIZATION ADMIN: CPT | Performed by: INTERNAL MEDICINE

## 2020-10-22 RX ORDER — FLUOXETINE 10 MG/1
10 CAPSULE ORAL DAILY
Qty: 90 CAPSULE | Refills: 3 | Status: SHIPPED | OUTPATIENT
Start: 2020-10-22 | End: 2021-11-10

## 2020-10-22 ASSESSMENT — PATIENT HEALTH QUESTIONNAIRE - PHQ9
2. FEELING DOWN, DEPRESSED OR HOPELESS: NOT AT ALL
CLINICAL INTERPRETATION OF PHQ2 SCORE: NO FURTHER SCREENING NEEDED
SUM OF ALL RESPONSES TO PHQ9 QUESTIONS 1 AND 2: 0
CLINICAL INTERPRETATION OF PHQ9 SCORE: NO FURTHER SCREENING NEEDED
SUM OF ALL RESPONSES TO PHQ9 QUESTIONS 1 AND 2: 0
1. LITTLE INTEREST OR PLEASURE IN DOING THINGS: NOT AT ALL

## 2020-10-28 ENCOUNTER — TELEPHONE (OUTPATIENT)
Dept: OBGYN | Age: 30
End: 2020-10-28

## 2020-10-28 LAB
CASE RPRT: ABNORMAL
CYTOLOGY CVX/VAG STUDY: ABNORMAL
CYTOLOGY CVX/VAG STUDY: ABNORMAL
GEN CATEG CVX/VAG CYTO-IMP: ABNORMAL
HPV16+18+45 E6+E7MRNA CVX NAA+PROBE: POSITIVE
Lab: ABNORMAL
PAP EDUCATIONAL NOTE: ABNORMAL
SPECIMEN ADEQUACY: ABNORMAL

## 2020-11-06 ENCOUNTER — OFFICE VISIT (OUTPATIENT)
Dept: URGENT CARE | Facility: CLINIC | Age: 30
End: 2020-11-06
Payer: COMMERCIAL

## 2020-11-06 VITALS
DIASTOLIC BLOOD PRESSURE: 66 MMHG | BODY MASS INDEX: 26.75 KG/M2 | WEIGHT: 151 LBS | SYSTOLIC BLOOD PRESSURE: 113 MMHG | HEART RATE: 87 BPM | RESPIRATION RATE: 16 BRPM | TEMPERATURE: 98 F | HEIGHT: 63 IN | OXYGEN SATURATION: 97 %

## 2020-11-06 DIAGNOSIS — R05.9 COUGH: ICD-10-CM

## 2020-11-06 DIAGNOSIS — Z20.822 CLOSE EXPOSURE TO COVID-19 VIRUS: Primary | ICD-10-CM

## 2020-11-06 LAB
CTP QC/QA: YES
SARS-COV-2 RDRP RESP QL NAA+PROBE: NEGATIVE

## 2020-11-06 PROCEDURE — U0002: ICD-10-PCS | Mod: QW,S$GLB,, | Performed by: NURSE PRACTITIONER

## 2020-11-06 PROCEDURE — U0002 COVID-19 LAB TEST NON-CDC: HCPCS | Mod: QW,S$GLB,, | Performed by: NURSE PRACTITIONER

## 2020-11-06 PROCEDURE — 99214 PR OFFICE/OUTPT VISIT, EST, LEVL IV, 30-39 MIN: ICD-10-PCS | Mod: S$GLB,,, | Performed by: NURSE PRACTITIONER

## 2020-11-06 PROCEDURE — 99214 OFFICE O/P EST MOD 30 MIN: CPT | Mod: S$GLB,,, | Performed by: NURSE PRACTITIONER

## 2020-11-06 NOTE — LETTER
[id number] [facility] [employer], [address] ? [place] [address] ? Phone [phone] ? Fax             Return to Work/School    Patient: Terri Tavera  YOB: 1990   Date: 11/06/2020      To Whom It May Concern:     Terri Tavera was in contact with/seen in my office on 11/06/2020. COVID-19 is present in our communities across the state. Not all patients are eligible or appropriate to be tested. In this situation, your employee meets the following criteria:     Terri Tavera has met the criteria for COVID-19 testing and has a Negative result. According to CDC guidelines, patient required to quarantine for 14 days due to recent exposure. Patient was unmasked and less than 6ft apart greater than 15mins.  She can return to work once they are asymptomatic for 24 hours without the use of fever reducing medications AND at least 14 days from the start of symptoms.     If you have any questions or concerns, or if I can be of further assistance, please do not hesitate to contact me.     Sincerely,    Cindy Dwyer NP

## 2020-11-07 NOTE — PATIENT INSTRUCTIONS
CDC Testing and Quarantine Guidelines for Exposure:  A close exposure is defined as anyone who had a masked or an unmasked exposure to a known COVID -19 positive person, at less than 6 ft for more than 15 minutes. If your exposure meets this definition, then you are required to quarantine for 14 days per the CDC. They now recommend that a test can be performed if you are asymptomatic (someone who does not have any symptoms), and a test should be done if you develop symptoms after an exposure as described above.  If you meet the definition of a close exposure, it does not matter whether or not you are asymptomatic or symptomatic - A NEGATIVE TEST DOES NOT GET YOU OUT OF 14 DAYS OF QUARANTINE!  Please note that if you are asymptomatic and wait more than 4 days to test after an exposure, you risk lengthening your quarantine. This is because if you test positive as an asymptomatic, your isolation is 10 days from the date of the positive test, not the date of exposure. So for example, if you test positive as an asymptomatic on day 7 from exposure, you have now extended your 14 day quarantine to a 17 day isolation.  If your exposure does not meet the above definition, you may return to your normal activities including social distancing, wearing masks, and frequent handwashing.      Instructions for Patients with Confirmed or Suspected COVID-19     Stay home and stay away from family members and friends. The CDC says, you can leave home after these three things have happened: 1) You have had no fever for at least 72 hours (that is three full days of no fever without the use of medicine that reduces fevers) 2) AND other symptoms have improved (for example, when your cough or shortness of breath have improved) 3) AND at least 10 days have passed since your symptoms first appeared.   Separate yourself from other people and animals in your home.   Call ahead before visiting your doctor.   Wear a facemask.   Cover your  coughs and sneezes.   Wash your hands often with soap and water; hand  can be used, too.   Avoid sharing personal household items.   Wipe down surfaces used daily.   Monitor your symptoms. Seek prompt medical attention if your illness is worsening (e.g., difficulty breathing).    Before seeking care, call your healthcare provider.   If you have a medical emergency and need to call 911, notify the dispatch personnel that you have, or are being evaluated for COVID-19. If possible, put on a facemask before emergency medical services arrive.        Recommended precautions for household members, intimate partners, and caregivers in a home setting of a patient with symptomatic laboratory-confirmed COVID-19 or a patient under investigation.  Household members, intimate partners, and caregivers in the home setting awaiting tests results have close contact with a person with symptomatic, laboratory-confirmed COVID-19 or a person under investigation. Close contacts should monitor their health; they should call their provider right away if they develop symptoms suggestive of COVID-19 (e.g., fever, cough, shortness of breath).    Close contacts should also follow these recommendations:   Make sure that you understand and can help the patient follow their provider's instructions for medication(s) and care. You should help the patient with basic needs in the home and provide support for getting groceries, prescriptions, and other personal needs.   Monitor the patient's symptoms. If the patient is getting sicker, call his or her healthcare provider and tell them that the patient has laboratory-confirmed COVID-19. If the patient has a medical emergency and you need to call 911, notify the dispatch personnel that the patient has, or is being evaluated for COVID-19.   Household members should stay in another room or be  from the patient. Household members should use a separate bedroom and bathroom, if  available.   Prohibit visitors.   Household members should care for any pets in the home.   Make sure that shared spaces in the home have good air flow, such as by an air conditioner or an opened window, weather permitting.   Perform hand hygiene frequently. Wash your hands often with soap and water for at least 20 seconds or use an alcohol-based hand  (that contains > 60% alcohol) covering all surfaces of your hands and rubbing them together until they feel dry. Soap and water should be used preferentially.   Avoid touching your eyes, nose, and mouth.   The patient should wear a facemask. If the patient is not able to wear a facemask (for example, because it causes trouble breathing), caregivers should wear a mask when they are in the same room as the patient.   Wear a disposable facemask and gloves when you touch or have contact with the patient's blood, stool, or body fluids, such as saliva, sputum, nasal mucus, vomit, urine.  o Throw out disposable facemasks and gloves after using them. Do not reuse.  o When removing personal protective equipment, first remove and dispose of gloves. Then, immediately clean your hands with soap and water or alcohol-based hand . Next, remove and dispose of facemask, and immediately clean your hands again with soap and water or alcohol-based hand .   You should not share dishes, drinking glasses, cups, eating utensils, towels, bedding, or other items with the patient. After the patient uses these items, you should wash them thoroughly (see below Wash laundry thoroughly).   Clean all high-touch surfaces, such as counters, tabletops, doorknobs, bathroom fixtures, toilets, phones, keyboards, tablets, and bedside tables, every day. Also, clean any surfaces that may have blood, stool, or body fluids on them.   Use a household cleaning spray or wipe, according to the label instructions. Labels contain instructions for safe and effective use of the  cleaning product including precautions you should take when applying the product, such as wearing gloves and making sure you have good ventilation during use of the product.   Wash laundry thoroughly.  o Immediately remove and wash clothes or bedding that have blood, stool, or body fluids on them.  o Wear disposable gloves while handling soiled items and keep soiled items away from your body. Clean your hands (with soap and water or an alcohol-based hand ) immediately after removing your gloves.  o Read and follow directions on labels of laundry or clothing items and detergent. In general, using a normal laundry detergent according to washing machine instructions and dry thoroughly using the warmest temperatures recommended on the clothing label.   Place all used disposable gloves, facemasks, and other contaminated items in a lined container before disposing of them with other household waste. Clean your hands (with soap and water or an alcohol-based hand ) immediately after handling these items. Soap and water should be used preferentially if hands are visibly dirty.   Discuss any additional questions with your state or local health department or healthcare provider. Check available hours when contacting your local health department.    For more information see CDC link below.      https://www.cdc.gov/coronavirus/2019-ncov/hcp/guidance-prevent-spread.html#precautions        Sources:  [facility], [facility] and [facility]    If you were prescribed a narcotic or controlled medication, do not drive or operate heavy equipment or machinery while taking these medications.  You must understand that you've received an Urgent Care treatment only and that you may be released before all your medical problems are known or treated. You, the patient, will arrange for follow up care as instructed.  Follow up with your PCP or specialty clinic as directed within 2-5 days if not improved or as  needed.  You can call [phone] to schedule an appointment with the appropriate provider.  If your condition worsens we recommend that you receive another evaluation at the emergency room immediately or contact your primary medical clinics after hours call service to discuss your concerns.  Please return here or go to the Emergency Department for any concerns or worsening of condition.

## 2020-11-07 NOTE — PROGRESS NOTES
"Subjective:       Patient ID: Terri Tavera is a 30 y.o. female.    Vitals:  height is 5' 3" (1.6 m) and weight is 68.5 kg (151 lb). Her temperature is 97.6 °F (36.4 °C). Her blood pressure is 113/66 and her pulse is 87. Her respiration is 16 and oxygen saturation is 97%.     Chief Complaint: COVID-19 Concerns, Cough, and Sore Throat    30 yr old female presents to the Urgent Care with concerns for Covid. 7 out of 20 people so far has tested positive for covid at a party she attended over the weekend. Patient reports diarrhea, cough, congestion and sore throat at this time. Patient denies any CP, SOB, abdominal pain, or fever at this time.     Cough  This is a new problem. The current episode started in the past 7 days (tuesday). The problem has been unchanged. Associated symptoms include myalgias, nasal congestion, postnasal drip and a sore throat. Pertinent negatives include no chest pain, chills, ear congestion, ear pain, eye redness, fever, headaches, heartburn, hemoptysis, rash, rhinorrhea, shortness of breath, sweats, weight loss or wheezing. Nothing aggravates the symptoms. Treatments tried: took advil. The treatment provided no relief.       Constitution: Negative for chills, sweating, fatigue and fever.   HENT: Positive for postnasal drip and sore throat. Negative for ear pain, drooling, sinus pain, sinus pressure, trouble swallowing and voice change.    Neck: Negative for painful lymph nodes.   Cardiovascular: Negative for chest pain and sob on exertion.   Eyes: Negative for eye redness.   Respiratory: Positive for cough. Negative for chest tightness, sputum production, bloody sputum, COPD, shortness of breath, stridor, wheezing and asthma.    Gastrointestinal: Negative for nausea and heartburn.   Genitourinary: Negative for dysuria.   Musculoskeletal: Positive for muscle ache. Negative for pain.   Skin: Negative for rash.   Allergic/Immunologic: Negative for seasonal allergies and asthma.   Neurological: " Negative for headaches, disorientation and altered mental status.   Hematologic/Lymphatic: Negative for swollen lymph nodes.   Psychiatric/Behavioral: Negative for altered mental status, disorientation and confusion.       Objective:      Physical Exam   Constitutional: She is oriented to person, place, and time. She appears well-developed. She is cooperative.  Non-toxic appearance. She does not appear ill. No distress.   HENT:   Head: Normocephalic and atraumatic.   Ears:   Right Ear: Hearing, tympanic membrane, abnromal external ear and ear canal normal.   Left Ear: Hearing, tympanic membrane, abnormal external ear and ear canal normal.   Nose: Mucosal edema and rhinorrhea present. No nasal deformity. No epistaxis. Right sinus exhibits no maxillary sinus tenderness and no frontal sinus tenderness. Left sinus exhibits no maxillary sinus tenderness and no frontal sinus tenderness.   Mouth/Throat: Uvula is midline, oropharynx is clear and moist and mucous membranes are normal. No trismus in the jaw. Normal dentition. No uvula swelling. No oropharyngeal exudate, posterior oropharyngeal edema or posterior oropharyngeal erythema. Tonsils are 2+ on the right. Tonsils are 2+ on the left. No tonsillar exudate.   Eyes: Pupils are equal, round, and reactive to light. Conjunctivae, EOM and lids are normal. No scleral icterus.   Neck: Trachea normal, full passive range of motion without pain and phonation normal. Neck supple. No neck rigidity. No edema and no erythema present.   Cardiovascular: Normal rate, regular rhythm, normal heart sounds and normal pulses.   Pulses:       Radial pulses are 2+ on the right side and 2+ on the left side.   Pulmonary/Chest: Effort normal and breath sounds normal. No accessory muscle usage. No respiratory distress. She has no decreased breath sounds. She has no rhonchi.   Musculoskeletal: Normal range of motion.         General: No deformity.   Lymphadenopathy:        Head (right side): No  tonsillar, no preauricular and no posterior auricular adenopathy present.        Head (left side): No tonsillar, no preauricular and no posterior auricular adenopathy present.   Neurological: She is alert and oriented to person, place, and time. She exhibits normal muscle tone. Coordination and gait normal.   Skin: Skin is warm, dry, intact, not diaphoretic and not pale. Capillary refill takes less than 2 seconds. Psychiatric: Her speech is normal and behavior is normal. Judgment and thought content normal.   Nursing note and vitals reviewed.        Assessment:       1. Close exposure to COVID-19 virus    2. Cough        Results for orders placed or performed in visit on 11/06/20   POCT COVID-19 Rapid Screening   Result Value Ref Range    POC Rapid COVID Negative Negative     Acceptable Yes        Plan:         Close exposure to COVID-19 virus    Cough  -     POCT COVID-19 Rapid Screening      Patient Instructions   CDC Testing and Quarantine Guidelines for Exposure:  A close exposure is defined as anyone who had a masked or an unmasked exposure to a known COVID -19 positive person, at less than 6 ft for more than 15 minutes. If your exposure meets this definition, then you are required to quarantine for 14 days per the CDC. They now recommend that a test can be performed if you are asymptomatic (someone who does not have any symptoms), and a test should be done if you develop symptoms after an exposure as described above.  If you meet the definition of a close exposure, it does not matter whether or not you are asymptomatic or symptomatic - A NEGATIVE TEST DOES NOT GET YOU OUT OF 14 DAYS OF QUARANTINE!  Please note that if you are asymptomatic and wait more than 4 days to test after an exposure, you risk lengthening your quarantine. This is because if you test positive as an asymptomatic, your isolation is 10 days from the date of the positive test, not the date of exposure. So for example, if you  test positive as an asymptomatic on day 7 from exposure, you have now extended your 14 day quarantine to a 17 day isolation.  If your exposure does not meet the above definition, you may return to your normal activities including social distancing, wearing masks, and frequent handwashing.      Instructions for Patients with Confirmed or Suspected COVID-19     Stay home and stay away from family members and friends. The CDC says, you can leave home after these three things have happened: 1) You have had no fever for at least 72 hours (that is three full days of no fever without the use of medicine that reduces fevers) 2) AND other symptoms have improved (for example, when your cough or shortness of breath have improved) 3) AND at least 10 days have passed since your symptoms first appeared.   Separate yourself from other people and animals in your home.   Call ahead before visiting your doctor.   Wear a facemask.   Cover your coughs and sneezes.   Wash your hands often with soap and water; hand  can be used, too.   Avoid sharing personal household items.   Wipe down surfaces used daily.   Monitor your symptoms. Seek prompt medical attention if your illness is worsening (e.g., difficulty breathing).    Before seeking care, call your healthcare provider.   If you have a medical emergency and need to call 911, notify the dispatch personnel that you have, or are being evaluated for COVID-19. If possible, put on a facemask before emergency medical services arrive.        Recommended precautions for household members, intimate partners, and caregivers in a home setting of a patient with symptomatic laboratory-confirmed COVID-19 or a patient under investigation.  Household members, intimate partners, and caregivers in the home setting awaiting tests results have close contact with a person with symptomatic, laboratory-confirmed COVID-19 or a person under investigation. Close contacts should monitor their  health; they should call their provider right away if they develop symptoms suggestive of COVID-19 (e.g., fever, cough, shortness of breath).    Close contacts should also follow these recommendations:   Make sure that you understand and can help the patient follow their provider's instructions for medication(s) and care. You should help the patient with basic needs in the home and provide support for getting groceries, prescriptions, and other personal needs.   Monitor the patient's symptoms. If the patient is getting sicker, call his or her healthcare provider and tell them that the patient has laboratory-confirmed COVID-19. If the patient has a medical emergency and you need to call 911, notify the dispatch personnel that the patient has, or is being evaluated for COVID-19.   Household members should stay in another room or be  from the patient. Household members should use a separate bedroom and bathroom, if available.   Prohibit visitors.   Household members should care for any pets in the home.   Make sure that shared spaces in the home have good air flow, such as by an air conditioner or an opened window, weather permitting.   Perform hand hygiene frequently. Wash your hands often with soap and water for at least 20 seconds or use an alcohol-based hand  (that contains > 60% alcohol) covering all surfaces of your hands and rubbing them together until they feel dry. Soap and water should be used preferentially.   Avoid touching your eyes, nose, and mouth.   The patient should wear a facemask. If the patient is not able to wear a facemask (for example, because it causes trouble breathing), caregivers should wear a mask when they are in the same room as the patient.   Wear a disposable facemask and gloves when you touch or have contact with the patient's blood, stool, or body fluids, such as saliva, sputum, nasal mucus, vomit, urine.  o Throw out disposable facemasks and gloves after  using them. Do not reuse.  o When removing personal protective equipment, first remove and dispose of gloves. Then, immediately clean your hands with soap and water or alcohol-based hand . Next, remove and dispose of facemask, and immediately clean your hands again with soap and water or alcohol-based hand .   You should not share dishes, drinking glasses, cups, eating utensils, towels, bedding, or other items with the patient. After the patient uses these items, you should wash them thoroughly (see below Wash laundry thoroughly).   Clean all high-touch surfaces, such as counters, tabletops, doorknobs, bathroom fixtures, toilets, phones, keyboards, tablets, and bedside tables, every day. Also, clean any surfaces that may have blood, stool, or body fluids on them.   Use a household cleaning spray or wipe, according to the label instructions. Labels contain instructions for safe and effective use of the cleaning product including precautions you should take when applying the product, such as wearing gloves and making sure you have good ventilation during use of the product.   Wash laundry thoroughly.  o Immediately remove and wash clothes or bedding that have blood, stool, or body fluids on them.  o Wear disposable gloves while handling soiled items and keep soiled items away from your body. Clean your hands (with soap and water or an alcohol-based hand ) immediately after removing your gloves.  o Read and follow directions on labels of laundry or clothing items and detergent. In general, using a normal laundry detergent according to washing machine instructions and dry thoroughly using the warmest temperatures recommended on the clothing label.   Place all used disposable gloves, facemasks, and other contaminated items in a lined container before disposing of them with other household waste. Clean your hands (with soap and water or an alcohol-based hand ) immediately after  handling these items. Soap and water should be used preferentially if hands are visibly dirty.   Discuss any additional questions with your state or local health department or healthcare provider. Check available hours when contacting your local health department.    For more information see CDC link below.      https://www.cdc.gov/coronavirus/2019-ncov/hcp/guidance-prevent-spread.html#precautions        Sources:  [facility], [place] [facility] and [facility]    If you were prescribed a narcotic or controlled medication, do not drive or operate heavy equipment or machinery while taking these medications.  You must understand that you've received an Urgent Care treatment only and that you may be released before all your medical problems are known or treated. You, the patient, will arrange for follow up care as instructed.  Follow up with your PCP or specialty clinic as directed within 2-5 days if not improved or as needed.  You can call [phone] to schedule an appointment with the appropriate provider.  If your condition worsens we recommend that you receive another evaluation at the emergency room immediately or contact your primary medical clinics after hours call service to discuss your concerns.  Please return here or go to the Emergency Department for any concerns or worsening of condition.

## 2020-11-08 ENCOUNTER — TELEPHONE (OUTPATIENT)
Dept: URGENT CARE | Facility: CLINIC | Age: 30
End: 2020-11-08

## 2020-11-13 ENCOUNTER — OFFICE VISIT (OUTPATIENT)
Dept: OBGYN | Age: 30
End: 2020-11-13

## 2020-11-13 VITALS — DIASTOLIC BLOOD PRESSURE: 82 MMHG | BODY MASS INDEX: 33.69 KG/M2 | SYSTOLIC BLOOD PRESSURE: 120 MMHG | WEIGHT: 184.2 LBS

## 2020-11-13 DIAGNOSIS — R87.810 ASCUS WITH POSITIVE HIGH RISK HPV CERVICAL: Primary | ICD-10-CM

## 2020-11-13 DIAGNOSIS — R87.610 ASCUS WITH POSITIVE HIGH RISK HPV CERVICAL: Primary | ICD-10-CM

## 2020-11-13 PROCEDURE — 57454 BX/CURETT OF CERVIX W/SCOPE: CPT | Performed by: OBSTETRICS & GYNECOLOGY

## 2020-11-13 PROCEDURE — 88305 TISSUE EXAM BY PATHOLOGIST: CPT | Performed by: PATHOLOGY

## 2020-11-17 LAB
ASR DISCLAIMER: NORMAL
CASE RPRT: NORMAL
CLINICAL INFO: NORMAL
PATH REPORT.FINAL DX SPEC: NORMAL
PATH REPORT.GROSS SPEC: NORMAL
PATH REPORT.MICROSCOPIC SPEC OTHER STN: NORMAL

## 2020-11-18 ENCOUNTER — TELEPHONE (OUTPATIENT)
Dept: OBGYN | Age: 30
End: 2020-11-18

## 2020-12-31 ENCOUNTER — OFFICE VISIT (OUTPATIENT)
Dept: URGENT CARE | Facility: CLINIC | Age: 30
End: 2020-12-31
Payer: COMMERCIAL

## 2020-12-31 VITALS
SYSTOLIC BLOOD PRESSURE: 127 MMHG | HEIGHT: 63 IN | WEIGHT: 150 LBS | TEMPERATURE: 98 F | BODY MASS INDEX: 26.58 KG/M2 | OXYGEN SATURATION: 99 % | RESPIRATION RATE: 20 BRPM | DIASTOLIC BLOOD PRESSURE: 61 MMHG | HEART RATE: 86 BPM

## 2020-12-31 DIAGNOSIS — R05.9 COUGH: ICD-10-CM

## 2020-12-31 DIAGNOSIS — J06.9 UPPER RESPIRATORY TRACT INFECTION, UNSPECIFIED TYPE: ICD-10-CM

## 2020-12-31 DIAGNOSIS — Z20.822 EXPOSURE TO COVID-19 VIRUS: Primary | ICD-10-CM

## 2020-12-31 LAB
CTP QC/QA: YES
SARS-COV-2 RDRP RESP QL NAA+PROBE: NEGATIVE

## 2020-12-31 PROCEDURE — 1125F AMNT PAIN NOTED PAIN PRSNT: CPT | Mod: S$GLB,,, | Performed by: NURSE PRACTITIONER

## 2020-12-31 PROCEDURE — 1125F PR PAIN SEVERITY QUANTIFIED, PAIN PRESENT: ICD-10-PCS | Mod: S$GLB,,, | Performed by: NURSE PRACTITIONER

## 2020-12-31 PROCEDURE — U0002 COVID-19 LAB TEST NON-CDC: HCPCS | Mod: QW,S$GLB,, | Performed by: NURSE PRACTITIONER

## 2020-12-31 PROCEDURE — U0002: ICD-10-PCS | Mod: QW,S$GLB,, | Performed by: NURSE PRACTITIONER

## 2020-12-31 PROCEDURE — 3008F PR BODY MASS INDEX (BMI) DOCUMENTED: ICD-10-PCS | Mod: CPTII,S$GLB,, | Performed by: NURSE PRACTITIONER

## 2020-12-31 PROCEDURE — 99214 PR OFFICE/OUTPT VISIT, EST, LEVL IV, 30-39 MIN: ICD-10-PCS | Mod: S$GLB,,, | Performed by: NURSE PRACTITIONER

## 2020-12-31 PROCEDURE — 99214 OFFICE O/P EST MOD 30 MIN: CPT | Mod: S$GLB,,, | Performed by: NURSE PRACTITIONER

## 2020-12-31 PROCEDURE — 3008F BODY MASS INDEX DOCD: CPT | Mod: CPTII,S$GLB,, | Performed by: NURSE PRACTITIONER

## 2020-12-31 RX ORDER — BENZONATATE 100 MG/1
100 CAPSULE ORAL 3 TIMES DAILY PRN
Qty: 30 CAPSULE | Refills: 1 | Status: SHIPPED | OUTPATIENT
Start: 2020-12-31 | End: 2021-12-31

## 2020-12-31 NOTE — LETTER
[address], [address] ? [place], [address] ? Phone [phone] ? Fax             Return to Work/School    Patient: Terri Tavera  YOB: 1990   Date: 12/31/2020      To Whom It May Concern:     Terri Tavera was in contact with/seen in my office on 12/31/2020. COVID-19 is present in our communities across the state. Not all patients are eligible or appropriate to be tested. In this situation, your employee meets the following criteria:      Terri Tavera has met the criteria for COVID-19 testing and has a NEGATIVE result. CDC Testing and Quarantine Guidelines for patients with exposure to a known-positive COVID-19 person:    A close exposure is defined as anyone who has had an exposure (masked or unmasked) to a known COVID -19 positive person within 6 ft for longer than 15 minutes. If your exposure meets this definition you are required by CDC guidelines to quarantine for at least 7-10 days from time of exposure. The CDC states that a test can be performed for an asymptomatic patient (someone who does not have any symptoms) after a close exposure, and that a test should be done if you develop symptoms after a close exposure as described above. Specifically, you can test at day 5 or later if asymptomatic, in order to get released from quarantine on day 7 or later. If you develop symptoms sooner, you should test when your symptoms start.    If you meet the definition of a close exposure, it will not matter whether you are experiencing symptoms- a quarantine for at least 7-10 days after a close exposure is required by CDC guidelines.    Please note, if you decide to test as an asymptomatic during your quarantine and you are positive, you will be restarting your quarantine and moving from a possible 10 day quarantine (if you do not test), to a 11 day or greater quarantine.    The CDC also suggests people still monitor for symptoms for a full 14 days and remember that the shorter  "quarantine options do not replace initial CDC guidance. The CDC continues to recommend quarantining for 14 days as the best way to reduce risk for spreading COVID-19 - however, this is only a recommendation.    If your exposure does not meet the above definition, you can return to your normal daily activities to include social distancing, wearing a mask and frequent handwashing.   NEGATIVE COVID TEST    You have tested negative for COVID-19 today. If you did not have a close exposure (as defined below) you can return to your normal daily activities to include social distancing, wearing a mask and frequent handwashing.    A "close exposure" is defined as anyone who has had an exposure (masked or unmasked) to a known COVID -19 positive person within 6 ft for longer than 15 minutes. If your exposure meets this definition, you are required by CDC guidelines to quarantine for at least 7-10 days from time of exposure.    The CDC states that a test can be performed for an asymptomatic patient (someone who does not have any symptoms) after a close exposure, and that a test should be done if you develop symptoms after a close exposure as described above.    Specifically, you can test at day 5 or later if asymptomatic in order to get released from quarantine on day 7 or later. If you develop symptoms sooner, you should test when your symptoms start.    If you developed symptoms since the exposure, and your test was negative today and less than 5 days from your exposure, you still have to quarantine for 7-10 days from the date of the exposure.    The 7-10 day quarantine begins from the day you were exposed, not the day of your test. For example, if your exposure was on a Monday, and you waited until Friday of the same week to get tested and it was negative, your 7-10 day quarantine begins from that Monday, not the Friday you tested negative.    Please note, if you decide to test as an asymptomatic during your " quarantine and you are positive, you will be restarting your quarantine and moving from a possible 10 day quarantine (if you do not test), to a 11 day or greater quarantine.     If you have any questions or concerns, or if I can be of further assistance, please do not hesitate to contact me.     Sincerely,    David Gonzalez, NP

## 2020-12-31 NOTE — PATIENT INSTRUCTIONS
 CDC Testing and Quarantine Guidelines for patients with exposure to a known-positive COVID-19 person:    A close exposure is defined as anyone who has had an exposure (masked or unmasked) to a known COVID -19 positive person within 6 ft for longer than 15 minutes. If your exposure meets this definition you are required by CDC guidelines to quarantine for at least 7-10 days from time of exposure. The CDC states that a test can be performed for an asymptomatic patient (someone who does not have any symptoms) after a close exposure, and that a test should be done if you develop symptoms after a close exposure as described above. Specifically, you can test at day 5 or later if asymptomatic, in order to get released from quarantine on day 7 or later. If you develop symptoms sooner, you should test when your symptoms start.    If you meet the definition of a close exposure, it will not matter whether you are experiencing symptoms- a quarantine for at least 7-10 days after a close exposure is required by CDC guidelines.    Please note, if you decide to test as an asymptomatic during your quarantine and you are positive, you will be restarting your quarantine and moving from a possible 10 day quarantine (if you do not test), to a 11 day or greater quarantine.    The CDC also suggests people still monitor for symptoms for a full 14 days and remember that the shorter quarantine options do not replace initial CDC guidance. The CDC continues to recommend quarantining for 14 days as the best way to reduce risk for spreading COVID-19 - however, this is only a recommendation.    If your exposure does not meet the above definition, you can return to your normal daily activities to include social distancing, wearing a mask and frequent handwashing.   NEGATIVE COVID TEST    You have tested negative for COVID-19 today. If you did not have a close exposure (as defined below) you can return to your normal daily activities to  "include social distancing, wearing a mask and frequent handwashing.    A "close exposure" is defined as anyone who has had an exposure (masked or unmasked) to a known COVID -19 positive person within 6 ft for longer than 15 minutes. If your exposure meets this definition, you are required by CDC guidelines to quarantine for at least 7-10 days from time of exposure.    The CDC states that a test can be performed for an asymptomatic patient (someone who does not have any symptoms) after a close exposure, and that a test should be done if you develop symptoms after a close exposure as described above.    Specifically, you can test at day 5 or later if asymptomatic in order to get released from quarantine on day 7 or later. If you develop symptoms sooner, you should test when your symptoms start.    If you developed symptoms since the exposure, and your test was negative today and less than 5 days from your exposure, you still have to quarantine for 7-10 days from the date of the exposure.    The 7-10 day quarantine begins from the day you were exposed, not the day of your test. For example, if your exposure was on a Monday, and you waited until Friday of the same week to get tested and it was negative, your 7-10 day quarantine begins from that Monday, not the Friday you tested negative.    Please note, if you decide to test as an asymptomatic during your quarantine and you are positive, you will be restarting your quarantine and moving from a possible 10 day quarantine (if you do not test), to a 11 day or greater quarantine.                                                               URI   If your condition worsens or fails to improve we recommend that you receive another evaluation at the ER immediately or contact your PCP to discuss your concerns or return here. You must understand that you've received an urgent care treatment only and that you may be released before all your medical problems are known or " "treated. You the patient will arrange for follouwp care as instructed.   If we discussed that I think your illness is viral, it will not respond to antibiotics and will last 5-7 days. If we discussed "wait and see" antibiotics and if over the next few days the symptoms worsen start the antibiotics I have given you.   -  If you are female and on BCP and do take the antibiotics, use additional methods to prevent pregnancy while on the antibiotics and for one cycle after.   -  Flonase (fluticasone) is a nasal spray which is available over the counter and may help with your symptoms.   -  Zyrtec D, Claritin D or Allegra D can also help with symptoms of congestion and drainage.   -  If you have hypertension avoid using the "D" which is the decongestant.  Instead you can use Coricidin HBP for cold and cough symptoms.    -  If you just have drainage you can take plain Zyrtec, Claritin or Allegra   -  If you just have a congested feeling you can take pseudoephedrine (unless you have high blood pressure) which you have to sign for behind the counter. Do not buy the phenylephrine which is on the shelf as it is not effective   -  Rest and fluids are also important.   -  Tylenol or ibuprofen can also be used as directed for pain unless you have an allergy to them or medical condition such as stomach ulcers, kidney or liver disease or blood thinners etc for which you should not be taking these type of medications.   -  If you are flying in the next few days Afrin nose drops for the airplane flight upon take off and landing may help. Other than at those times refrain from using afrin.   - If you were prescribed a narcotic do not drive or operate heavy machinery while taking these medications.     Cough   If your condition worsens or fails to improve we recommend that you receive another evaluation at the ER immediately or contact your PCP to discuss your concerns or return here. You must understand that you've received an urgent " care treatment only and that you may be released before all your medical problems are known or treated. You the patient will arrange for follouwp care as instructed. .  Rest and fluids are important  Can use honey with maynor to soothe your throat  Take prescription cough meds (pills) as prescribed; take over the counter cough syrup at night as needed for cough.  Do not take both the prescribed cough pills and syrup at the same time - wait at least 6-8 hrs between dosages.   -  Flonase (fluticasone) is a nasal spray which is available over the counter and may help with your symptoms.   -  If you just have drainage you can take plain Zyrtec, Claritin or Allegra   -  Tylenol or ibuprofen can also be used as directed for pain unless you have an allergy to them or medical condition such as stomach ulcers, kidney or liver disease or blood thinners etc for which you should not be taking these type of medications.   Please follow up with your primary care doctor or specialist in the next 48-72hrs as needed and if no improvement  If you  smoke, please stop smoking.      Viral Upper Respiratory Illness (Adult)  You have a viral upper respiratory illness (URI), which is another term for the common cold. This illness is contagious during the first few days. It is spread through the air by coughing and sneezing. It may also be spread by direct contact (touching the sick person and then touching your own eyes, nose, or mouth). Frequent handwashing will decrease risk of spread. Most viral illnesses go away within 7 to 10 days with rest and simple home remedies. Sometimes the illness may last for several weeks. Antibiotics will not kill a virus, and they are generally not prescribed for this condition.    Home care  · If symptoms are severe, rest at home for the first 2 to 3 days. When you resume activity, don't let yourself get too tired.  · Avoid being exposed to cigarette smoke (yours or others).  · You may use acetaminophen  or ibuprofen to control pain and fever, unless another medicine was prescribed. (Note: If you have chronic liver or kidney disease, have ever had a stomach ulcer or gastrointestinal bleeding, or are taking blood-thinning medicines, talk with your healthcare provider before using these medicines.) Aspirin should never be given to anyone under 18 years of age who is ill with a viral infection or fever. It may cause severe liver or brain damage.  · Your appetite may be poor, so a light diet is fine. Avoid dehydration by drinking 6 to 8 glasses of fluids per day (water, soft drinks, juices, tea, or soup). Extra fluids will help loosen secretions in the nose and lungs.  · Over-the-counter cold medicines will not shorten the length of time youre sick, but they may be helpful for the following symptoms: cough, sore throat, and nasal and sinus congestion. (Note: Do not use decongestants if you have high blood pressure.)  Follow-up care  Follow up with your healthcare provider, or as advised.  When to seek medical advice  Call your healthcare provider right away if any of these occur:  · Cough with lots of colored sputum (mucus)  · Severe headache; face, neck, or ear pain  · Difficulty swallowing due to throat pain  · Fever of 100.4°F (38°C)  Call 911, or get immediate medical care  Call emergency services right away if any of these occur:  · Chest pain, shortness of breath, wheezing, or difficulty breathing  · Coughing up blood  · Inability to swallow due to throat pain  Date Last Reviewed: 9/13/2015  © 1044-0910 [employer]. [address] [place] [address], [place] [address]. All rights reserved. This information is not intended as a substitute for professional medical care. Always follow your healthcare professional's instructions.        Cough, Chronic, Uncertain Cause (Adult)    Everyone has had a cough as part of the common cold, flu, or bronchitis. This kind of cough occurs along with an achy feeling, low-grade  fever, nasal and sinus congestion, and a scratchy or sore throat. This usually gets better in 2 to 3 weeks. A cough that lasts longer than 3 weeks may be due to other causes.  If your cough does not improve over the next 2 weeks, further testing may be needed. Follow up with your healthcare provider as advised. Cough suppressants may be recommended. Based on your exam today, the exact cause of your cough is not certain. Below are some common causes for persistent cough.  Smokers cough  Smokers cough doesnt go away. If you continue to smoke, it only gets worse. The cough is from irritation in the air passages. Talk to your healthcare provider about quitting. Medicines or nicotine-replacement products, like gum or the patch, may make quitting easier.  Postnasal drip  A cough that is worse at night may be due to postnasal drip. Excess mucus in the nose drains from the back of your nose to your throat. This triggers the cough reflex. Postnasal drip may be due to a sinus infection or allergy. Common allergens include dust, tobacco smoke (both inhaled and secondhand smoke), environmental pollutants, pollen, mold, pets, cleaning agents, room deodorizers, and chemical fumes. Over-the-counter antihistamines or decongestants may be helpful for allergies. A sinus infection may requires antibiotic treatment. See your healthcare provider if symptoms continue.  Medicines  Certain prescribed medicines can cause a chronic cough in some people:  · ACE inhibitors for high blood pressure. These include benazepril, captopril, enalapril, fosinopril, lisinopril, quinapril, ramipril, and others.  · Beta-blockers for high blood pressure and other conditions. These include propranolol, atenolol, metoprolol, nadolol, and others.  Let your healthcare provider know if you are taking any of these.  Asthma  Cough may be the only sign of mild asthma. You may have tests to find out if asthma is causing your cough. You may also take asthma  medicine on a trial basis.  Acid reflux (heartburn, GERD)  The esophagus is the tube that carries food from the mouth to the stomach. A valve at its lower end prevents stomach acids from flowing upward. If this valve does not work properly, acid from the stomach enters the esophagus. This may cause a burning pain in the upper abdomen or lower chest, belching, or cough. Symptoms are often worse when lying flat. Avoid eating or drinking before bedtime. Try using extra pillows to raise your upper body, or place 4-inch blocks under the head of your bed. You may try an over-the-counter antacid or an acid-blocking medicine such as famotidine, cimetidine, ranitidine, esomeprazole, lansoprazole, or omeprazole. Stronger medicines for this condition can be prescribed by your healthcare provider.  Follow-up care  Follow up with your healthcare provider, or as advised, if your cough does not improve. Further testing may be needed.  Note: If an X-ray was taken, a specialist will review it. You will be notified of any new findings that may affect your care.  When to seek medical advice  Call your healthcare provider right away if any of these occur:  · Mild wheezing or difficulty breathing  · Fever of 100.4ºF (38ºC) or higher, or as directed by your healthcare provider  · Unexpected weight loss  · Coughing up large amounts of colored sputum  · Night sweats (sheets and pajamas get soaking wet)  Call 911, or get immediate medical care  Contact emergency services right away if any of these occur:  · Coughing up blood  · Moderate to severe trouble breathing or wheezing  Date Last Reviewed: 9/13/2015 © 2000-2017 [employer]. [address] [place] [address], [place] [address]. All rights reserved. This information is not intended as a substitute for professional medical care. Always follow your healthcare professional's instructions.

## 2020-12-31 NOTE — PROGRESS NOTES
"Subjective:       Patient ID: Terri Tavera is a 30 y.o. female.    Vitals:  height is 5' 3" (1.6 m) and weight is 68 kg (150 lb). Her tympanic temperature is 98.2 °F (36.8 °C). Her blood pressure is 127/61 and her pulse is 86. Her respiration is 20 and oxygen saturation is 99%.     Chief Complaint: URI    Exposed to covid last night.    URI   This is a new problem. The current episode started in the past 7 days (sunday). The problem has been gradually worsening. Associated symptoms include congestion, coughing, neck pain, sinus pain and a sore throat. She has tried decongestant and antihistamine for the symptoms. The treatment provided mild relief.       Constitution: Positive for chills.   HENT: Positive for congestion, sinus pain and sore throat.    Neck: Positive for neck pain.   Eyes: Negative.    Respiratory: Positive for cough.    Gastrointestinal: Negative.    Genitourinary: Negative.    Allergic/Immunologic: Negative.    Neurological: Negative.        Objective:      Physical Exam   Constitutional: She is oriented to person, place, and time. She appears well-developed. She is cooperative.  Non-toxic appearance. She does not appear ill. No distress.   HENT:   Head: Normocephalic and atraumatic.   Ears:   Right Ear: Hearing, tympanic membrane, external ear and ear canal normal.   Left Ear: Hearing, tympanic membrane, external ear and ear canal normal.   Nose: Nose normal. No mucosal edema, rhinorrhea or nasal deformity. No epistaxis. Right sinus exhibits no maxillary sinus tenderness and no frontal sinus tenderness. Left sinus exhibits no maxillary sinus tenderness and no frontal sinus tenderness.   Mouth/Throat: Uvula is midline and mucous membranes are normal. Mucous membranes are moist. No trismus in the jaw. Normal dentition. No uvula swelling. Posterior oropharyngeal erythema present. No oropharyngeal exudate or posterior oropharyngeal edema. Oropharynx is clear.       Eyes: Pupils are equal, round, and " reactive to light. Conjunctivae and lids are normal. No scleral icterus. extraocular movement intact  Neck: Trachea normal, full passive range of motion without pain and phonation normal. Neck supple. No neck rigidity. No edema and no erythema present.   Cardiovascular: Normal rate, regular rhythm, S1 normal, S2 normal, normal heart sounds and normal pulses. Exam reveals no decreased pulses.   Pulmonary/Chest: Effort normal and breath sounds normal. No accessory muscle usage. No respiratory distress. She has no decreased breath sounds. She has no wheezes. She has no rhonchi. She has no rales.   Abdominal: Normal appearance.   Musculoskeletal: Normal range of motion.         General: No deformity.   Neurological: She is alert and oriented to person, place, and time. She exhibits normal muscle tone. Coordination normal.   Skin: Skin is warm, dry, intact, not diaphoretic and not pale. Psychiatric: Her speech is normal and behavior is normal. Judgment and thought content normal.   Nursing note and vitals reviewed.        Results for orders placed or performed in visit on 12/31/20   POCT COVID-19 Rapid Screening   Result Value Ref Range    POC Rapid COVID Negative Negative     Acceptable Yes       Assessment:       1. Exposure to COVID-19 virus    2. Cough    3. Upper respiratory tract infection, unspecified type        Plan:         Exposure to COVID-19 virus    Cough  -     POCT COVID-19 Rapid Screening  -     benzonatate (TESSALON PERLES) 100 MG capsule; Take 1 capsule (100 mg total) by mouth 3 (three) times daily as needed for Cough.  Dispense: 30 capsule; Refill: 1    Upper respiratory tract infection, unspecified type      Patient Instructions    CDC Testing and Quarantine Guidelines for patients with exposure to a known-positive COVID-19 person:    A close exposure is defined as anyone who has had an exposure (masked or unmasked) to a known COVID -19 positive person within 6 ft for longer than  "15 minutes. If your exposure meets this definition you are required by CDC guidelines to quarantine for at least 7-10 days from time of exposure. The CDC states that a test can be performed for an asymptomatic patient (someone who does not have any symptoms) after a close exposure, and that a test should be done if you develop symptoms after a close exposure as described above. Specifically, you can test at day 5 or later if asymptomatic, in order to get released from quarantine on day 7 or later. If you develop symptoms sooner, you should test when your symptoms start.    If you meet the definition of a close exposure, it will not matter whether you are experiencing symptoms- a quarantine for at least 7-10 days after a close exposure is required by CDC guidelines.    Please note, if you decide to test as an asymptomatic during your quarantine and you are positive, you will be restarting your quarantine and moving from a possible 10 day quarantine (if you do not test), to a 11 day or greater quarantine.    The CDC also suggests people still monitor for symptoms for a full 14 days and remember that the shorter quarantine options do not replace initial CDC guidance. The CDC continues to recommend quarantining for 14 days as the best way to reduce risk for spreading COVID-19 - however, this is only a recommendation.    If your exposure does not meet the above definition, you can return to your normal daily activities to include social distancing, wearing a mask and frequent handwashing.   NEGATIVE COVID TEST    You have tested negative for COVID-19 today. If you did not have a close exposure (as defined below) you can return to your normal daily activities to include social distancing, wearing a mask and frequent handwashing.    A "close exposure" is defined as anyone who has had an exposure (masked or unmasked) to a known COVID -19 positive person within 6 ft for longer than 15 minutes. If your exposure meets " "this definition, you are required by CDC guidelines to quarantine for at least 7-10 days from time of exposure.    The CDC states that a test can be performed for an asymptomatic patient (someone who does not have any symptoms) after a close exposure, and that a test should be done if you develop symptoms after a close exposure as described above.    Specifically, you can test at day 5 or later if asymptomatic in order to get released from quarantine on day 7 or later. If you develop symptoms sooner, you should test when your symptoms start.    If you developed symptoms since the exposure, and your test was negative today and less than 5 days from your exposure, you still have to quarantine for 7-10 days from the date of the exposure.    The 7-10 day quarantine begins from the day you were exposed, not the day of your test. For example, if your exposure was on a Monday, and you waited until Friday of the same week to get tested and it was negative, your 7-10 day quarantine begins from that Monday, not the Friday you tested negative.    Please note, if you decide to test as an asymptomatic during your quarantine and you are positive, you will be restarting your quarantine and moving from a possible 10 day quarantine (if you do not test), to a 11 day or greater quarantine.                                                               URI   If your condition worsens or fails to improve we recommend that you receive another evaluation at the ER immediately or contact your PCP to discuss your concerns or return here. You must understand that you've received an urgent care treatment only and that you may be released before all your medical problems are known or treated. You the patient will arrange for follw care as instructed.   If we discussed that I think your illness is viral, it will not respond to antibiotics and will last 5-7 days. If we discussed "wait and see" antibiotics and if over the next few days the " "symptoms worsen start the antibiotics I have given you.   -  If you are female and on BCP and do take the antibiotics, use additional methods to prevent pregnancy while on the antibiotics and for one cycle after.   -  Flonase (fluticasone) is a nasal spray which is available over the counter and may help with your symptoms.   -  Zyrtec D, Claritin D or Allegra D can also help with symptoms of congestion and drainage.   -  If you have hypertension avoid using the "D" which is the decongestant.  Instead you can use Coricidin HBP for cold and cough symptoms.    -  If you just have drainage you can take plain Zyrtec, Claritin or Allegra   -  If you just have a congested feeling you can take pseudoephedrine (unless you have high blood pressure) which you have to sign for behind the counter. Do not buy the phenylephrine which is on the shelf as it is not effective   -  Rest and fluids are also important.   -  Tylenol or ibuprofen can also be used as directed for pain unless you have an allergy to them or medical condition such as stomach ulcers, kidney or liver disease or blood thinners etc for which you should not be taking these type of medications.   -  If you are flying in the next few days Afrin nose drops for the airplane flight upon take off and landing may help. Other than at those times refrain from using afrin.   - If you were prescribed a narcotic do not drive or operate heavy machinery while taking these medications.     Cough   If your condition worsens or fails to improve we recommend that you receive another evaluation at the ER immediately or contact your PCP to discuss your concerns or return here. You must understand that you've received an urgent care treatment only and that you may be released before all your medical problems are known or treated. You the patient will arrange for follouwp care as instructed. .  Rest and fluids are important  Can use honey with maynor to soothe your throat  Take " prescription cough meds (pills) as prescribed; take over the counter cough syrup at night as needed for cough.  Do not take both the prescribed cough pills and syrup at the same time - wait at least 6-8 hrs between dosages.   -  Flonase (fluticasone) is a nasal spray which is available over the counter and may help with your symptoms.   -  If you just have drainage you can take plain Zyrtec, Claritin or Allegra   -  Tylenol or ibuprofen can also be used as directed for pain unless you have an allergy to them or medical condition such as stomach ulcers, kidney or liver disease or blood thinners etc for which you should not be taking these type of medications.   Please follow up with your primary care doctor or specialist in the next 48-72hrs as needed and if no improvement  If you  smoke, please stop smoking.      Viral Upper Respiratory Illness (Adult)  You have a viral upper respiratory illness (URI), which is another term for the common cold. This illness is contagious during the first few days. It is spread through the air by coughing and sneezing. It may also be spread by direct contact (touching the sick person and then touching your own eyes, nose, or mouth). Frequent handwashing will decrease risk of spread. Most viral illnesses go away within 7 to 10 days with rest and simple home remedies. Sometimes the illness may last for several weeks. Antibiotics will not kill a virus, and they are generally not prescribed for this condition.    Home care  · If symptoms are severe, rest at home for the first 2 to 3 days. When you resume activity, don't let yourself get too tired.  · Avoid being exposed to cigarette smoke (yours or others).  · You may use acetaminophen or ibuprofen to control pain and fever, unless another medicine was prescribed. (Note: If you have chronic liver or kidney disease, have ever had a stomach ulcer or gastrointestinal bleeding, or are taking blood-thinning medicines, talk with your  healthcare provider before using these medicines.) Aspirin should never be given to anyone under 18 years of age who is ill with a viral infection or fever. It may cause severe liver or brain damage.  · Your appetite may be poor, so a light diet is fine. Avoid dehydration by drinking 6 to 8 glasses of fluids per day (water, soft drinks, juices, tea, or soup). Extra fluids will help loosen secretions in the nose and lungs.  · Over-the-counter cold medicines will not shorten the length of time youre sick, but they may be helpful for the following symptoms: cough, sore throat, and nasal and sinus congestion. (Note: Do not use decongestants if you have high blood pressure.)  Follow-up care  Follow up with your healthcare provider, or as advised.  When to seek medical advice  Call your healthcare provider right away if any of these occur:  · Cough with lots of colored sputum (mucus)  · Severe headache; face, neck, or ear pain  · Difficulty swallowing due to throat pain  · Fever of 100.4°F (38°C)  Call 911, or get immediate medical care  Call emergency services right away if any of these occur:  · Chest pain, shortness of breath, wheezing, or difficulty breathing  · Coughing up blood  · Inability to swallow due to throat pain  Date Last Reviewed: 9/13/2015  © 9496-6282 [employer]. [address] [place] [address]. All rights reserved. This information is not intended as a substitute for professional medical care. Always follow your healthcare professional's instructions.        Cough, Chronic, Uncertain Cause (Adult)    Everyone has had a cough as part of the common cold, flu, or bronchitis. This kind of cough occurs along with an achy feeling, low-grade fever, nasal and sinus congestion, and a scratchy or sore throat. This usually gets better in 2 to 3 weeks. A cough that lasts longer than 3 weeks may be due to other causes.  If your cough does not improve over the next 2 weeks, further testing  may be needed. Follow up with your healthcare provider as advised. Cough suppressants may be recommended. Based on your exam today, the exact cause of your cough is not certain. Below are some common causes for persistent cough.  Smokers cough  Smokers cough doesnt go away. If you continue to smoke, it only gets worse. The cough is from irritation in the air passages. Talk to your healthcare provider about quitting. Medicines or nicotine-replacement products, like gum or the patch, may make quitting easier.  Postnasal drip  A cough that is worse at night may be due to postnasal drip. Excess mucus in the nose drains from the back of your nose to your throat. This triggers the cough reflex. Postnasal drip may be due to a sinus infection or allergy. Common allergens include dust, tobacco smoke (both inhaled and secondhand smoke), environmental pollutants, pollen, mold, pets, cleaning agents, room deodorizers, and chemical fumes. Over-the-counter antihistamines or decongestants may be helpful for allergies. A sinus infection may requires antibiotic treatment. See your healthcare provider if symptoms continue.  Medicines  Certain prescribed medicines can cause a chronic cough in some people:  · ACE inhibitors for high blood pressure. These include benazepril, captopril, enalapril, fosinopril, lisinopril, quinapril, ramipril, and others.  · Beta-blockers for high blood pressure and other conditions. These include propranolol, atenolol, metoprolol, nadolol, and others.  Let your healthcare provider know if you are taking any of these.  Asthma  Cough may be the only sign of mild asthma. You may have tests to find out if asthma is causing your cough. You may also take asthma medicine on a trial basis.  Acid reflux (heartburn, GERD)  The esophagus is the tube that carries food from the mouth to the stomach. A valve at its lower end prevents stomach acids from flowing upward. If this valve does not work properly, acid from  the stomach enters the esophagus. This may cause a burning pain in the upper abdomen or lower chest, belching, or cough. Symptoms are often worse when lying flat. Avoid eating or drinking before bedtime. Try using extra pillows to raise your upper body, or place 4-inch blocks under the head of your bed. You may try an over-the-counter antacid or an acid-blocking medicine such as famotidine, cimetidine, ranitidine, esomeprazole, lansoprazole, or omeprazole. Stronger medicines for this condition can be prescribed by your healthcare provider.  Follow-up care  Follow up with your healthcare provider, or as advised, if your cough does not improve. Further testing may be needed.  Note: If an X-ray was taken, a specialist will review it. You will be notified of any new findings that may affect your care.  When to seek medical advice  Call your healthcare provider right away if any of these occur:  · Mild wheezing or difficulty breathing  · Fever of 100.4ºF (38ºC) or higher, or as directed by your healthcare provider  · Unexpected weight loss  · Coughing up large amounts of colored sputum  · Night sweats (sheets and pajamas get soaking wet)  Call 911, or get immediate medical care  Contact emergency services right away if any of these occur:  · Coughing up blood  · Moderate to severe trouble breathing or wheezing  Date Last Reviewed: 9/13/2015  © 6681-9298 [employer]. [address] [place] [address], [place] [address]. All rights reserved. This information is not intended as a substitute for professional medical care. Always follow your healthcare professional's instructions.

## 2021-01-03 ENCOUNTER — TELEPHONE (OUTPATIENT)
Dept: URGENT CARE | Facility: CLINIC | Age: 31
End: 2021-01-03

## 2021-01-03 RX ORDER — METHYLPREDNISOLONE 4 MG/1
TABLET ORAL
Qty: 1 PACKAGE | Refills: 0 | Status: SHIPPED | OUTPATIENT
Start: 2021-01-03 | End: 2022-04-18

## 2021-01-03 RX ORDER — BROMPHENIRAMINE MALEATE, PSEUDOEPHEDRINE HYDROCHLORIDE, AND DEXTROMETHORPHAN HYDROBROMIDE 2; 30; 10 MG/5ML; MG/5ML; MG/5ML
10 SYRUP ORAL EVERY 4 HOURS PRN
Qty: 120 ML | Refills: 0 | Status: SHIPPED | OUTPATIENT
Start: 2021-01-03 | End: 2021-01-13

## 2021-01-05 ENCOUNTER — NURSE ONLY (OUTPATIENT)
Dept: OBGYN | Age: 31
End: 2021-01-05

## 2021-01-05 DIAGNOSIS — Z30.42 DEPO-PROVERA CONTRACEPTIVE STATUS: Primary | ICD-10-CM

## 2021-01-05 PROCEDURE — 96372 THER/PROPH/DIAG INJ SC/IM: CPT | Performed by: OBSTETRICS & GYNECOLOGY

## 2021-01-05 RX ORDER — MEDROXYPROGESTERONE ACETATE 150 MG/ML
150 INJECTION, SUSPENSION INTRAMUSCULAR ONCE
Status: COMPLETED | OUTPATIENT
Start: 2021-01-05 | End: 2021-01-05

## 2021-01-05 RX ADMIN — MEDROXYPROGESTERONE ACETATE 150 MG: 150 INJECTION, SUSPENSION INTRAMUSCULAR at 16:15

## 2021-01-06 ENCOUNTER — PATIENT MESSAGE (OUTPATIENT)
Dept: INTERNAL MEDICINE | Facility: CLINIC | Age: 31
End: 2021-01-06

## 2021-01-08 ENCOUNTER — HOSPITAL ENCOUNTER (OUTPATIENT)
Dept: RADIOLOGY | Facility: CLINIC | Age: 31
Discharge: HOME | End: 2021-01-08
Attending: STUDENT IN AN ORGANIZED HEALTH CARE EDUCATION/TRAINING PROGRAM
Payer: COMMERCIAL

## 2021-01-08 ENCOUNTER — OFFICE VISIT (OUTPATIENT)
Dept: URGENT CARE | Facility: CLINIC | Age: 31
End: 2021-01-08
Payer: COMMERCIAL

## 2021-01-08 VITALS
DIASTOLIC BLOOD PRESSURE: 60 MMHG | TEMPERATURE: 98 F | SYSTOLIC BLOOD PRESSURE: 119 MMHG | OXYGEN SATURATION: 97 % | HEART RATE: 84 BPM | RESPIRATION RATE: 16 BRPM

## 2021-01-08 DIAGNOSIS — J40 BRONCHITIS: Primary | ICD-10-CM

## 2021-01-08 DIAGNOSIS — Z20.822 SUSPECTED COVID-19 VIRUS INFECTION: ICD-10-CM

## 2021-01-08 DIAGNOSIS — J06.9 VIRAL URI WITH COUGH: ICD-10-CM

## 2021-01-08 LAB
CTP QC/QA: YES
SARS-COV-2 RDRP RESP QL NAA+PROBE: NEGATIVE

## 2021-01-08 PROCEDURE — 71046 X-RAY EXAM CHEST 2 VIEWS: CPT | Mod: S$GLB,,, | Performed by: RADIOLOGY

## 2021-01-08 PROCEDURE — 99214 PR OFFICE/OUTPT VISIT, EST, LEVL IV, 30-39 MIN: ICD-10-PCS | Mod: S$GLB,,, | Performed by: STUDENT IN AN ORGANIZED HEALTH CARE EDUCATION/TRAINING PROGRAM

## 2021-01-08 PROCEDURE — 99214 OFFICE O/P EST MOD 30 MIN: CPT | Mod: S$GLB,,, | Performed by: STUDENT IN AN ORGANIZED HEALTH CARE EDUCATION/TRAINING PROGRAM

## 2021-01-08 PROCEDURE — U0002 COVID-19 LAB TEST NON-CDC: HCPCS | Mod: QW,S$GLB,, | Performed by: STUDENT IN AN ORGANIZED HEALTH CARE EDUCATION/TRAINING PROGRAM

## 2021-01-08 PROCEDURE — U0002: ICD-10-PCS | Mod: QW,S$GLB,, | Performed by: STUDENT IN AN ORGANIZED HEALTH CARE EDUCATION/TRAINING PROGRAM

## 2021-01-08 PROCEDURE — 71046 XR CHEST PA AND LATERAL: ICD-10-PCS | Mod: S$GLB,,, | Performed by: RADIOLOGY

## 2021-01-08 RX ORDER — IPRATROPIUM BROMIDE 42 UG/1
2 SPRAY, METERED NASAL 3 TIMES DAILY
Qty: 15 ML | Refills: 0 | Status: SHIPPED | OUTPATIENT
Start: 2021-01-08 | End: 2021-01-13

## 2021-01-08 RX ORDER — ALBUTEROL SULFATE 90 UG/1
2-4 AEROSOL, METERED RESPIRATORY (INHALATION) EVERY 6 HOURS PRN
Qty: 6.7 G | Refills: 0 | Status: SHIPPED | OUTPATIENT
Start: 2021-01-08 | End: 2021-01-22

## 2021-02-02 ENCOUNTER — PATIENT OUTREACH (OUTPATIENT)
Dept: ADMINISTRATIVE | Facility: OTHER | Age: 31
End: 2021-02-02

## 2021-02-03 ENCOUNTER — OFFICE VISIT (OUTPATIENT)
Dept: PODIATRY | Facility: CLINIC | Age: 31
End: 2021-02-03
Payer: COMMERCIAL

## 2021-02-03 VITALS
BODY MASS INDEX: 26.58 KG/M2 | SYSTOLIC BLOOD PRESSURE: 110 MMHG | WEIGHT: 150 LBS | DIASTOLIC BLOOD PRESSURE: 61 MMHG | HEART RATE: 88 BPM | HEIGHT: 63 IN

## 2021-02-03 DIAGNOSIS — M79.674 TOE PAIN, RIGHT: ICD-10-CM

## 2021-02-03 DIAGNOSIS — L60.0 INGROWN NAIL: Primary | ICD-10-CM

## 2021-02-03 DIAGNOSIS — L03.031 PARONYCHIA, TOE, RIGHT: ICD-10-CM

## 2021-02-03 PROCEDURE — 99999 PR PBB SHADOW E&M-EST. PATIENT-LVL IV: ICD-10-PCS | Mod: PBBFAC,,, | Performed by: PODIATRIST

## 2021-02-03 PROCEDURE — 11750 NAIL REMOVAL: ICD-10-PCS | Mod: T5,S$GLB,, | Performed by: PODIATRIST

## 2021-02-03 PROCEDURE — 11750 EXCISION NAIL&NAIL MATRIX: CPT | Mod: T5,S$GLB,, | Performed by: PODIATRIST

## 2021-02-03 PROCEDURE — 99214 PR OFFICE/OUTPT VISIT, EST, LEVL IV, 30-39 MIN: ICD-10-PCS | Mod: 25,S$GLB,, | Performed by: PODIATRIST

## 2021-02-03 PROCEDURE — 99999 PR PBB SHADOW E&M-EST. PATIENT-LVL IV: CPT | Mod: PBBFAC,,, | Performed by: PODIATRIST

## 2021-02-03 PROCEDURE — 1126F AMNT PAIN NOTED NONE PRSNT: CPT | Mod: S$GLB,,, | Performed by: PODIATRIST

## 2021-02-03 PROCEDURE — 3008F BODY MASS INDEX DOCD: CPT | Mod: CPTII,S$GLB,, | Performed by: PODIATRIST

## 2021-02-03 PROCEDURE — 3008F PR BODY MASS INDEX (BMI) DOCUMENTED: ICD-10-PCS | Mod: CPTII,S$GLB,, | Performed by: PODIATRIST

## 2021-02-03 PROCEDURE — 99214 OFFICE O/P EST MOD 30 MIN: CPT | Mod: 25,S$GLB,, | Performed by: PODIATRIST

## 2021-02-03 PROCEDURE — 1126F PR PAIN SEVERITY QUANTIFIED, NO PAIN PRESENT: ICD-10-PCS | Mod: S$GLB,,, | Performed by: PODIATRIST

## 2021-02-03 RX ORDER — TOBRAMYCIN 3 MG/ML
SOLUTION/ DROPS OPHTHALMIC
Qty: 5 ML | Refills: 3 | Status: SHIPPED | OUTPATIENT
Start: 2021-02-03 | End: 2022-02-02

## 2021-02-23 ENCOUNTER — OFFICE VISIT (OUTPATIENT)
Dept: PODIATRY | Facility: CLINIC | Age: 31
End: 2021-02-23
Payer: COMMERCIAL

## 2021-02-23 VITALS
BODY MASS INDEX: 26.58 KG/M2 | DIASTOLIC BLOOD PRESSURE: 71 MMHG | SYSTOLIC BLOOD PRESSURE: 106 MMHG | HEIGHT: 63 IN | WEIGHT: 150 LBS | HEART RATE: 79 BPM

## 2021-02-23 DIAGNOSIS — Z98.890 POST-OPERATIVE STATE: Primary | ICD-10-CM

## 2021-02-23 DIAGNOSIS — M79.672 FOOT PAIN, LEFT: ICD-10-CM

## 2021-02-23 DIAGNOSIS — B07.0 VERRUCA PLANTARIS: ICD-10-CM

## 2021-02-23 PROCEDURE — 99213 OFFICE O/P EST LOW 20 MIN: CPT | Mod: 25,S$GLB,, | Performed by: PODIATRIST

## 2021-02-23 PROCEDURE — 1126F AMNT PAIN NOTED NONE PRSNT: CPT | Mod: S$GLB,,, | Performed by: PODIATRIST

## 2021-02-23 PROCEDURE — 3008F BODY MASS INDEX DOCD: CPT | Mod: CPTII,S$GLB,, | Performed by: PODIATRIST

## 2021-02-23 PROCEDURE — 17110 DESTRUCTION B9 LES UP TO 14: CPT | Mod: S$GLB,,, | Performed by: PODIATRIST

## 2021-02-23 PROCEDURE — 17110 PR DESTRUCTION BENIGN LESIONS UP TO 14: ICD-10-PCS | Mod: S$GLB,,, | Performed by: PODIATRIST

## 2021-02-23 PROCEDURE — 1126F PR PAIN SEVERITY QUANTIFIED, NO PAIN PRESENT: ICD-10-PCS | Mod: S$GLB,,, | Performed by: PODIATRIST

## 2021-02-23 PROCEDURE — 99213 PR OFFICE/OUTPT VISIT, EST, LEVL III, 20-29 MIN: ICD-10-PCS | Mod: 25,S$GLB,, | Performed by: PODIATRIST

## 2021-02-23 PROCEDURE — 99999 PR PBB SHADOW E&M-EST. PATIENT-LVL IV: CPT | Mod: PBBFAC,,, | Performed by: PODIATRIST

## 2021-02-23 PROCEDURE — 99999 PR PBB SHADOW E&M-EST. PATIENT-LVL IV: ICD-10-PCS | Mod: PBBFAC,,, | Performed by: PODIATRIST

## 2021-02-23 PROCEDURE — 3008F PR BODY MASS INDEX (BMI) DOCUMENTED: ICD-10-PCS | Mod: CPTII,S$GLB,, | Performed by: PODIATRIST

## 2021-04-05 ENCOUNTER — PATIENT MESSAGE (OUTPATIENT)
Dept: ADMINISTRATIVE | Facility: HOSPITAL | Age: 31
End: 2021-04-05

## 2021-05-17 ENCOUNTER — OFFICE VISIT (OUTPATIENT)
Dept: OBSTETRICS AND GYNECOLOGY | Facility: CLINIC | Age: 31
End: 2021-05-17
Payer: COMMERCIAL

## 2021-05-17 VITALS
BODY MASS INDEX: 26.21 KG/M2 | SYSTOLIC BLOOD PRESSURE: 110 MMHG | HEIGHT: 63 IN | DIASTOLIC BLOOD PRESSURE: 70 MMHG | WEIGHT: 147.94 LBS

## 2021-05-17 DIAGNOSIS — Z01.419 ENCOUNTER FOR GYNECOLOGICAL EXAMINATION: Primary | ICD-10-CM

## 2021-05-17 DIAGNOSIS — Z30.431 IUD CHECK UP: ICD-10-CM

## 2021-05-17 DIAGNOSIS — Z12.4 PAP SMEAR FOR CERVICAL CANCER SCREENING: ICD-10-CM

## 2021-05-17 DIAGNOSIS — Z11.51 ENCOUNTER FOR SCREENING FOR HUMAN PAPILLOMAVIRUS (HPV): ICD-10-CM

## 2021-05-17 PROCEDURE — 3008F BODY MASS INDEX DOCD: CPT | Mod: CPTII,S$GLB,, | Performed by: OBSTETRICS & GYNECOLOGY

## 2021-05-17 PROCEDURE — 88175 CYTOPATH C/V AUTO FLUID REDO: CPT | Performed by: OBSTETRICS & GYNECOLOGY

## 2021-05-17 PROCEDURE — 1126F AMNT PAIN NOTED NONE PRSNT: CPT | Mod: S$GLB,,, | Performed by: OBSTETRICS & GYNECOLOGY

## 2021-05-17 PROCEDURE — 99999 PR PBB SHADOW E&M-EST. PATIENT-LVL III: CPT | Mod: PBBFAC,,, | Performed by: OBSTETRICS & GYNECOLOGY

## 2021-05-17 PROCEDURE — 99395 PR PREVENTIVE VISIT,EST,18-39: ICD-10-PCS | Mod: S$GLB,,, | Performed by: OBSTETRICS & GYNECOLOGY

## 2021-05-17 PROCEDURE — 1126F PR PAIN SEVERITY QUANTIFIED, NO PAIN PRESENT: ICD-10-PCS | Mod: S$GLB,,, | Performed by: OBSTETRICS & GYNECOLOGY

## 2021-05-17 PROCEDURE — 3008F PR BODY MASS INDEX (BMI) DOCUMENTED: ICD-10-PCS | Mod: CPTII,S$GLB,, | Performed by: OBSTETRICS & GYNECOLOGY

## 2021-05-17 PROCEDURE — 87624 HPV HI-RISK TYP POOLED RSLT: CPT | Performed by: OBSTETRICS & GYNECOLOGY

## 2021-05-17 PROCEDURE — 99395 PREV VISIT EST AGE 18-39: CPT | Mod: S$GLB,,, | Performed by: OBSTETRICS & GYNECOLOGY

## 2021-05-17 PROCEDURE — 99999 PR PBB SHADOW E&M-EST. PATIENT-LVL III: ICD-10-PCS | Mod: PBBFAC,,, | Performed by: OBSTETRICS & GYNECOLOGY

## 2021-05-20 LAB
FINAL PATHOLOGIC DIAGNOSIS: NORMAL
Lab: NORMAL

## 2021-05-21 VITALS
DIASTOLIC BLOOD PRESSURE: 70 MMHG | BODY MASS INDEX: 29.08 KG/M2 | DIASTOLIC BLOOD PRESSURE: 62 MMHG | OXYGEN SATURATION: 100 % | HEART RATE: 82 BPM | RESPIRATION RATE: 20 BRPM | HEART RATE: 74 BPM | HEIGHT: 62 IN | RESPIRATION RATE: 20 BRPM | SYSTOLIC BLOOD PRESSURE: 120 MMHG | TEMPERATURE: 99.6 F | HEIGHT: 62 IN | TEMPERATURE: 98.5 F | WEIGHT: 158 LBS | WEIGHT: 160 LBS | OXYGEN SATURATION: 98 % | BODY MASS INDEX: 29.44 KG/M2 | SYSTOLIC BLOOD PRESSURE: 118 MMHG

## 2021-05-25 LAB
HPV HR 12 DNA SPEC QL NAA+PROBE: NEGATIVE
HPV16 AG SPEC QL: NEGATIVE
HPV18 DNA SPEC QL NAA+PROBE: NEGATIVE

## 2021-07-07 ENCOUNTER — PATIENT MESSAGE (OUTPATIENT)
Dept: ADMINISTRATIVE | Facility: HOSPITAL | Age: 31
End: 2021-07-07

## 2021-07-27 ENCOUNTER — CLINICAL SUPPORT (OUTPATIENT)
Dept: URGENT CARE | Facility: CLINIC | Age: 31
End: 2021-07-27
Payer: COMMERCIAL

## 2021-07-27 DIAGNOSIS — Z20.822 CONTACT WITH AND (SUSPECTED) EXPOSURE TO COVID-19: Primary | ICD-10-CM

## 2021-07-27 LAB
CTP QC/QA: YES
SARS-COV-2 RDRP RESP QL NAA+PROBE: NEGATIVE

## 2021-07-27 PROCEDURE — 99211 PR OFFICE/OUTPT VISIT, EST, LEVL I: ICD-10-PCS | Mod: S$GLB,,, | Performed by: NURSE PRACTITIONER

## 2021-07-27 PROCEDURE — U0002 COVID-19 LAB TEST NON-CDC: HCPCS | Mod: QW,S$GLB,, | Performed by: NURSE PRACTITIONER

## 2021-07-27 PROCEDURE — 99211 OFF/OP EST MAY X REQ PHY/QHP: CPT | Mod: S$GLB,,, | Performed by: NURSE PRACTITIONER

## 2021-07-27 PROCEDURE — U0002: ICD-10-PCS | Mod: QW,S$GLB,, | Performed by: NURSE PRACTITIONER

## 2021-09-06 ENCOUNTER — PATIENT MESSAGE (OUTPATIENT)
Dept: OBSTETRICS AND GYNECOLOGY | Facility: CLINIC | Age: 31
End: 2021-09-06

## 2021-09-30 ENCOUNTER — OFFICE VISIT (OUTPATIENT)
Dept: INTERNAL MEDICINE | Age: 31
End: 2021-09-30

## 2021-09-30 ENCOUNTER — E-ADVICE (OUTPATIENT)
Dept: INTERNAL MEDICINE | Age: 31
End: 2021-09-30

## 2021-09-30 VITALS
BODY MASS INDEX: 33.13 KG/M2 | WEIGHT: 180 LBS | SYSTOLIC BLOOD PRESSURE: 118 MMHG | OXYGEN SATURATION: 98 % | HEART RATE: 92 BPM | DIASTOLIC BLOOD PRESSURE: 84 MMHG | HEIGHT: 62 IN

## 2021-09-30 DIAGNOSIS — J06.9 UPPER RESPIRATORY TRACT INFECTION, UNSPECIFIED TYPE: Primary | ICD-10-CM

## 2021-09-30 PROCEDURE — 99213 OFFICE O/P EST LOW 20 MIN: CPT | Performed by: INTERNAL MEDICINE

## 2021-09-30 RX ORDER — ALBUTEROL SULFATE 90 UG/1
2 AEROSOL, METERED RESPIRATORY (INHALATION) EVERY 4 HOURS PRN
Qty: 1 EACH | Refills: 0 | Status: SHIPPED | OUTPATIENT
Start: 2021-09-30

## 2021-09-30 RX ORDER — PREDNISONE 20 MG/1
TABLET ORAL
Qty: 10 TABLET | Refills: 0 | Status: SHIPPED | OUTPATIENT
Start: 2021-09-30 | End: 2022-12-18 | Stop reason: ALTCHOICE

## 2021-09-30 RX ORDER — COVID-19 TEST SPECIMEN COLLECT
MISCELLANEOUS MISCELLANEOUS
COMMUNITY
Start: 2021-09-28 | End: 2021-09-30 | Stop reason: ALTCHOICE

## 2021-09-30 RX ORDER — AZITHROMYCIN 250 MG/1
TABLET, FILM COATED ORAL
Qty: 6 TABLET | Refills: 0 | Status: SHIPPED | OUTPATIENT
Start: 2021-09-30 | End: 2021-10-05

## 2021-09-30 ASSESSMENT — PATIENT HEALTH QUESTIONNAIRE - PHQ9
1. LITTLE INTEREST OR PLEASURE IN DOING THINGS: NOT AT ALL
SUM OF ALL RESPONSES TO PHQ9 QUESTIONS 1 AND 2: 0
CLINICAL INTERPRETATION OF PHQ9 SCORE: NO FURTHER SCREENING NEEDED
CLINICAL INTERPRETATION OF PHQ2 SCORE: NO FURTHER SCREENING NEEDED
2. FEELING DOWN, DEPRESSED OR HOPELESS: NOT AT ALL
SUM OF ALL RESPONSES TO PHQ9 QUESTIONS 1 AND 2: 0

## 2021-10-04 ENCOUNTER — PATIENT MESSAGE (OUTPATIENT)
Dept: ADMINISTRATIVE | Facility: HOSPITAL | Age: 31
End: 2021-10-04

## 2021-10-29 ENCOUNTER — OFFICE VISIT (OUTPATIENT)
Dept: OBSTETRICS AND GYNECOLOGY | Facility: CLINIC | Age: 31
End: 2021-10-29
Payer: COMMERCIAL

## 2021-10-29 VITALS
WEIGHT: 158.31 LBS | DIASTOLIC BLOOD PRESSURE: 70 MMHG | HEIGHT: 63 IN | SYSTOLIC BLOOD PRESSURE: 116 MMHG | BODY MASS INDEX: 28.05 KG/M2

## 2021-10-29 DIAGNOSIS — Z30.432 ENCOUNTER FOR REMOVAL OF INTRAUTERINE CONTRACEPTIVE DEVICE: Primary | ICD-10-CM

## 2021-10-29 PROCEDURE — 3078F PR MOST RECENT DIASTOLIC BLOOD PRESSURE < 80 MM HG: ICD-10-PCS | Mod: CPTII,S$GLB,, | Performed by: OBSTETRICS & GYNECOLOGY

## 2021-10-29 PROCEDURE — 1159F PR MEDICATION LIST DOCUMENTED IN MEDICAL RECORD: ICD-10-PCS | Mod: CPTII,S$GLB,, | Performed by: OBSTETRICS & GYNECOLOGY

## 2021-10-29 PROCEDURE — 1159F MED LIST DOCD IN RCRD: CPT | Mod: CPTII,S$GLB,, | Performed by: OBSTETRICS & GYNECOLOGY

## 2021-10-29 PROCEDURE — 3074F PR MOST RECENT SYSTOLIC BLOOD PRESSURE < 130 MM HG: ICD-10-PCS | Mod: CPTII,S$GLB,, | Performed by: OBSTETRICS & GYNECOLOGY

## 2021-10-29 PROCEDURE — 99999 PR PBB SHADOW E&M-EST. PATIENT-LVL III: ICD-10-PCS | Mod: PBBFAC,,, | Performed by: OBSTETRICS & GYNECOLOGY

## 2021-10-29 PROCEDURE — 58301 REMOVE INTRAUTERINE DEVICE: CPT | Mod: S$GLB,,, | Performed by: OBSTETRICS & GYNECOLOGY

## 2021-10-29 PROCEDURE — 3074F SYST BP LT 130 MM HG: CPT | Mod: CPTII,S$GLB,, | Performed by: OBSTETRICS & GYNECOLOGY

## 2021-10-29 PROCEDURE — 3078F DIAST BP <80 MM HG: CPT | Mod: CPTII,S$GLB,, | Performed by: OBSTETRICS & GYNECOLOGY

## 2021-10-29 PROCEDURE — 3008F BODY MASS INDEX DOCD: CPT | Mod: CPTII,S$GLB,, | Performed by: OBSTETRICS & GYNECOLOGY

## 2021-10-29 PROCEDURE — 3008F PR BODY MASS INDEX (BMI) DOCUMENTED: ICD-10-PCS | Mod: CPTII,S$GLB,, | Performed by: OBSTETRICS & GYNECOLOGY

## 2021-10-29 PROCEDURE — 58301 PR REMOVE, INTRAUTERINE DEVICE: ICD-10-PCS | Mod: S$GLB,,, | Performed by: OBSTETRICS & GYNECOLOGY

## 2021-10-29 PROCEDURE — 99499 UNLISTED E&M SERVICE: CPT | Mod: S$GLB,,, | Performed by: OBSTETRICS & GYNECOLOGY

## 2021-10-29 PROCEDURE — 99999 PR PBB SHADOW E&M-EST. PATIENT-LVL III: CPT | Mod: PBBFAC,,, | Performed by: OBSTETRICS & GYNECOLOGY

## 2021-10-29 PROCEDURE — 99499 NO LOS: ICD-10-PCS | Mod: S$GLB,,, | Performed by: OBSTETRICS & GYNECOLOGY

## 2021-10-29 RX ORDER — FLUCONAZOLE 150 MG/1
TABLET ORAL
Qty: 2 TABLET | Refills: 0 | Status: SHIPPED | OUTPATIENT
Start: 2021-10-29 | End: 2022-04-18

## 2021-11-10 RX ORDER — FLUOXETINE 10 MG/1
CAPSULE ORAL
Qty: 90 CAPSULE | Refills: 0 | Status: SHIPPED | OUTPATIENT
Start: 2021-11-10 | End: 2022-02-09

## 2021-12-10 ENCOUNTER — PATIENT MESSAGE (OUTPATIENT)
Dept: OBSTETRICS AND GYNECOLOGY | Facility: CLINIC | Age: 31
End: 2021-12-10
Payer: COMMERCIAL

## 2022-01-06 ENCOUNTER — E-ADVICE (OUTPATIENT)
Dept: INTERNAL MEDICINE | Age: 32
End: 2022-01-06

## 2022-01-23 ENCOUNTER — PATIENT MESSAGE (OUTPATIENT)
Dept: DERMATOLOGY | Facility: CLINIC | Age: 32
End: 2022-01-23
Payer: COMMERCIAL

## 2022-01-26 ENCOUNTER — PATIENT MESSAGE (OUTPATIENT)
Dept: ADMINISTRATIVE | Facility: HOSPITAL | Age: 32
End: 2022-01-26
Payer: COMMERCIAL

## 2022-02-02 ENCOUNTER — OFFICE VISIT (OUTPATIENT)
Dept: PODIATRY | Facility: CLINIC | Age: 32
End: 2022-02-02
Payer: COMMERCIAL

## 2022-02-02 VITALS
WEIGHT: 153 LBS | HEIGHT: 63 IN | BODY MASS INDEX: 27.11 KG/M2 | SYSTOLIC BLOOD PRESSURE: 115 MMHG | HEART RATE: 91 BPM | DIASTOLIC BLOOD PRESSURE: 57 MMHG

## 2022-02-02 DIAGNOSIS — L03.031 PARONYCHIA, TOE, RIGHT: ICD-10-CM

## 2022-02-02 DIAGNOSIS — M79.674 TOE PAIN, RIGHT: Primary | ICD-10-CM

## 2022-02-02 DIAGNOSIS — L60.0 INGROWN NAIL: ICD-10-CM

## 2022-02-02 PROCEDURE — 3074F PR MOST RECENT SYSTOLIC BLOOD PRESSURE < 130 MM HG: ICD-10-PCS | Mod: CPTII,S$GLB,, | Performed by: PODIATRIST

## 2022-02-02 PROCEDURE — 3008F PR BODY MASS INDEX (BMI) DOCUMENTED: ICD-10-PCS | Mod: CPTII,S$GLB,, | Performed by: PODIATRIST

## 2022-02-02 PROCEDURE — 3008F BODY MASS INDEX DOCD: CPT | Mod: CPTII,S$GLB,, | Performed by: PODIATRIST

## 2022-02-02 PROCEDURE — 3078F PR MOST RECENT DIASTOLIC BLOOD PRESSURE < 80 MM HG: ICD-10-PCS | Mod: CPTII,S$GLB,, | Performed by: PODIATRIST

## 2022-02-02 PROCEDURE — 1159F MED LIST DOCD IN RCRD: CPT | Mod: CPTII,S$GLB,, | Performed by: PODIATRIST

## 2022-02-02 PROCEDURE — 1159F PR MEDICATION LIST DOCUMENTED IN MEDICAL RECORD: ICD-10-PCS | Mod: CPTII,S$GLB,, | Performed by: PODIATRIST

## 2022-02-02 PROCEDURE — 99999 PR PBB SHADOW E&M-EST. PATIENT-LVL III: CPT | Mod: PBBFAC,,, | Performed by: PODIATRIST

## 2022-02-02 PROCEDURE — 3078F DIAST BP <80 MM HG: CPT | Mod: CPTII,S$GLB,, | Performed by: PODIATRIST

## 2022-02-02 PROCEDURE — 99214 PR OFFICE/OUTPT VISIT, EST, LEVL IV, 30-39 MIN: ICD-10-PCS | Mod: S$GLB,,, | Performed by: PODIATRIST

## 2022-02-02 PROCEDURE — 3074F SYST BP LT 130 MM HG: CPT | Mod: CPTII,S$GLB,, | Performed by: PODIATRIST

## 2022-02-02 PROCEDURE — 1160F RVW MEDS BY RX/DR IN RCRD: CPT | Mod: CPTII,S$GLB,, | Performed by: PODIATRIST

## 2022-02-02 PROCEDURE — 1160F PR REVIEW ALL MEDS BY PRESCRIBER/CLIN PHARMACIST DOCUMENTED: ICD-10-PCS | Mod: CPTII,S$GLB,, | Performed by: PODIATRIST

## 2022-02-02 PROCEDURE — 99999 PR PBB SHADOW E&M-EST. PATIENT-LVL III: ICD-10-PCS | Mod: PBBFAC,,, | Performed by: PODIATRIST

## 2022-02-02 PROCEDURE — 99214 OFFICE O/P EST MOD 30 MIN: CPT | Mod: S$GLB,,, | Performed by: PODIATRIST

## 2022-02-02 RX ORDER — TOBRAMYCIN 3 MG/ML
SOLUTION/ DROPS OPHTHALMIC
Qty: 5 ML | Refills: 3 | Status: SHIPPED | OUTPATIENT
Start: 2022-02-02 | End: 2022-04-18

## 2022-02-02 NOTE — PROGRESS NOTES
Subjective:      Patient ID: Terri Tavera is a 31 y.o. female.    Chief Complaint: Ingrown Toenail (Right Great toenail)    Sharp throbbing pain with redness medial right hallux border indicated with index finger. Gradual onset, worsening over past several days, aggravated by increased weight bearing, shoe gear, pressure.  No previous medical treatment.  OTC pain med not helping.  Prior matricectomy about 1 year ago worked well until the past few days.    Review of Systems   Constitutional: Negative for chills, diaphoresis, fever, malaise/fatigue and night sweats.   Cardiovascular: Negative for claudication, cyanosis, leg swelling and syncope.   Skin: Positive for nail changes. Negative for color change, dry skin, rash, suspicious lesions and unusual hair distribution.   Musculoskeletal: Negative for falls, joint pain, joint swelling, muscle cramps, muscle weakness and stiffness.   Gastrointestinal: Negative for constipation, diarrhea, nausea and vomiting.   Neurological: Negative for brief paralysis, disturbances in coordination, focal weakness, numbness, paresthesias, sensory change and tremors.           Objective:      Physical Exam  Constitutional:       General: She is not in acute distress.     Appearance: She is well-developed and well-nourished. She is not diaphoretic.   Cardiovascular:      Pulses:           Popliteal pulses are 2+ on the right side and 2+ on the left side.        Dorsalis pedis pulses are 2+ on the right side and 2+ on the left side.        Posterior tibial pulses are 2+ on the right side and 2+ on the left side.      Comments: Capillary refill 3 seconds all toes/distal feet, all toes/both feet warm to touch.      Negative lymphadenopathy bilateral popliteal fossa and tarsal tunnel.      Negavie lower extremity edema bilateral.    Musculoskeletal:      Right ankle: No swelling, deformity, ecchymosis or lacerations. Normal range of motion. Normal pulse.      Right Achilles Tendon:  Normal. No pain or defects. Hester's test negative.   Lymphadenopathy:      Lower Body: No right inguinal adenopathy. No left inguinal adenopathy.      Comments: Negative lymphadenopathy bilateral popliteal fossa and tarsal tunnel.    Negative lymphangitic streaking bilateral feet/ankles/legs.   Skin:     General: Skin is warm, dry and intact.      Capillary Refill: Capillary refill takes 2 to 3 seconds.      Coloration: Skin is not pale.      Findings: No abrasion, bruising, burn, ecchymosis, erythema, laceration, lesion or rash.      Nails: There is no clubbing or cyanosis.      Comments: Visible and palpable ingrowth of toenail medial border right hallux with pain to palpation, and focal localized erythema and edema,  without ulceration, drainage, pus, tracking, fluctuance, malodor, or cardinal signs infection.    Otherwise, Skin is normal age and health appropriate color, turgor, texture, and temperature bilateral lower extremities without ulceration, hyperpigmentation, discoloration, masses nodules or cords palpated.  No ecchymosis, erythema, edema, or cardinal signs of infection bilateral lower extremities.     Neurological:      Mental Status: She is alert and oriented to person, place, and time.      Sensory: No sensory deficit.      Motor: No tremor, atrophy or abnormal muscle tone.      Gait: Gait normal.      Deep Tendon Reflexes: Strength normal.   Psychiatric:         Mood and Affect: Mood and affect normal.         Behavior: Behavior is cooperative.               Assessment:       Encounter Diagnoses   Name Primary?    Toe pain, right Yes    Paronychia, toe, right     Ingrown nail          Plan:       Terri was seen today for ingrown toenail.    Diagnoses and all orders for this visit:    Toe pain, right    Paronychia, toe, right    Ingrown nail    Other orders  -     tobramycin sulfate 0.3% (TOBREX) 0.3 % ophthalmic solution; 1-2 drops topically twice daily to affected toe(s).      I counseled  the patient on her conditions, their implications and medical management.    Topical tobramycin drops twice daily.    Cover right hallux all times with Band-Aid or similar changing daily.    Discussed conservative treatment with shoes of adequate dimensions, material, and style to alleviate symptoms and delay or prevent surgical intervention.      Utilizing sterile toenail clippers I aggressively debrided the offending nail border approximately 3 mm from its edge and carried the nail plate incision down at an angle in order to wedge out the offending cryptotic portion of the nail plate. The offending border was then removed in toto. The area was cleansed with alcohol. Patient tolerated the procedure well and related significant relief.          Follow up in about 1 week (around 2/9/2022) for Matricectomy medial right hallux..

## 2022-02-09 RX ORDER — FLUOXETINE 10 MG/1
CAPSULE ORAL
Qty: 90 CAPSULE | Refills: 0 | Status: SHIPPED | OUTPATIENT
Start: 2022-02-09 | End: 2022-04-14 | Stop reason: SDUPTHER

## 2022-02-22 DIAGNOSIS — D84.9 IMMUNOSUPPRESSED STATUS: ICD-10-CM

## 2022-03-08 ENCOUNTER — TELEPHONE (OUTPATIENT)
Dept: SURGERY | Facility: CLINIC | Age: 32
End: 2022-03-08
Payer: COMMERCIAL

## 2022-03-09 ENCOUNTER — OFFICE VISIT (OUTPATIENT)
Dept: SURGERY | Facility: CLINIC | Age: 32
End: 2022-03-09
Payer: COMMERCIAL

## 2022-03-09 VITALS
HEART RATE: 88 BPM | DIASTOLIC BLOOD PRESSURE: 57 MMHG | SYSTOLIC BLOOD PRESSURE: 116 MMHG | BODY MASS INDEX: 28.29 KG/M2 | WEIGHT: 159.69 LBS | HEIGHT: 63 IN

## 2022-03-09 DIAGNOSIS — M53.3 COCCYGODYNIA: ICD-10-CM

## 2022-03-09 PROCEDURE — 99204 OFFICE O/P NEW MOD 45 MIN: CPT | Mod: 25,S$GLB,, | Performed by: COLON & RECTAL SURGERY

## 2022-03-09 PROCEDURE — 99999 PR PBB SHADOW E&M-EST. PATIENT-LVL IV: ICD-10-PCS | Mod: PBBFAC,,, | Performed by: COLON & RECTAL SURGERY

## 2022-03-09 PROCEDURE — 1159F PR MEDICATION LIST DOCUMENTED IN MEDICAL RECORD: ICD-10-PCS | Mod: CPTII,S$GLB,, | Performed by: COLON & RECTAL SURGERY

## 2022-03-09 PROCEDURE — 46600 DIAGNOSTIC ANOSCOPY SPX: CPT | Mod: S$GLB,,, | Performed by: COLON & RECTAL SURGERY

## 2022-03-09 PROCEDURE — 3008F BODY MASS INDEX DOCD: CPT | Mod: CPTII,S$GLB,, | Performed by: COLON & RECTAL SURGERY

## 2022-03-09 PROCEDURE — 1159F MED LIST DOCD IN RCRD: CPT | Mod: CPTII,S$GLB,, | Performed by: COLON & RECTAL SURGERY

## 2022-03-09 PROCEDURE — 3078F DIAST BP <80 MM HG: CPT | Mod: CPTII,S$GLB,, | Performed by: COLON & RECTAL SURGERY

## 2022-03-09 PROCEDURE — 99999 PR PBB SHADOW E&M-EST. PATIENT-LVL IV: CPT | Mod: PBBFAC,,, | Performed by: COLON & RECTAL SURGERY

## 2022-03-09 PROCEDURE — 3078F PR MOST RECENT DIASTOLIC BLOOD PRESSURE < 80 MM HG: ICD-10-PCS | Mod: CPTII,S$GLB,, | Performed by: COLON & RECTAL SURGERY

## 2022-03-09 PROCEDURE — 99204 PR OFFICE/OUTPT VISIT, NEW, LEVL IV, 45-59 MIN: ICD-10-PCS | Mod: 25,S$GLB,, | Performed by: COLON & RECTAL SURGERY

## 2022-03-09 PROCEDURE — 3074F SYST BP LT 130 MM HG: CPT | Mod: CPTII,S$GLB,, | Performed by: COLON & RECTAL SURGERY

## 2022-03-09 PROCEDURE — 46600 PR DIAG2STIC A2SCOPY: ICD-10-PCS | Mod: S$GLB,,, | Performed by: COLON & RECTAL SURGERY

## 2022-03-09 PROCEDURE — 3008F PR BODY MASS INDEX (BMI) DOCUMENTED: ICD-10-PCS | Mod: CPTII,S$GLB,, | Performed by: COLON & RECTAL SURGERY

## 2022-03-09 PROCEDURE — 3074F PR MOST RECENT SYSTOLIC BLOOD PRESSURE < 130 MM HG: ICD-10-PCS | Mod: CPTII,S$GLB,, | Performed by: COLON & RECTAL SURGERY

## 2022-03-09 RX ORDER — MELOXICAM 15 MG/1
15 TABLET ORAL DAILY
Qty: 21 TABLET | Refills: 1 | Status: SHIPPED | OUTPATIENT
Start: 2022-03-09 | End: 2022-03-30

## 2022-03-09 RX ORDER — MIRTAZAPINE 7.5 MG/1
7.5 TABLET, FILM COATED ORAL NIGHTLY
COMMUNITY
End: 2022-06-30

## 2022-03-09 NOTE — PROGRESS NOTES
"    CRS Office Visit History and Physical    Referring Md:   Alcira Hernandez Md  [address] [place] [address]    SUBJECTIVE:     Chief Complaint: pilonidal cyst     History of Present Illness:  Patient reports a several month history of paying over the sakel area. She is notices when driving back-and-forth from [place] to [place]. She feels some swelling and discomfort when she sits. It has never been red. It has never drain pus. She denies ever falling on her tailbone. She denies any problems moving bowels.  No pain.  No blood.  No prolapse.    Can reproduce pain on "glute sets" when sitting - contraction of gluteal muscles.    Last Colonoscopy: never      Past Medical History:   Diagnosis Date    ALL (acute lymphoblastic leukemia)     Allergy     Anxiety     Depression     Fever blister     History of cholecystectomy     Hypogammaglobulinaemia, unspecified     Irritable bowel syndrome        Past Surgical History:   Procedure Laterality Date    BONE MARROW BIOPSY      Central port and subsequent removal      CHOLECYSTECTOMY  8/11    lap    INCISION AND DRAINAGE OF WOUND      [employer]    INTRATHECAL PUMP IMPLANTATION      for chemo    TONSILLECTOMY         Review of patient's allergies indicates:   Allergen Reactions    Cephalosporins Hives     duracef only    Vitamin k analogues     Duricef [cefadroxil] Hives     Hives and fever    Vitamin k      Other reaction(s): Discolored leg skin    Bactrim [sulfamethoxazole-trimethoprim] Nausea And Vomiting       Current Outpatient Medications on File Prior to Visit   Medication Sig Dispense Refill    ACZONE 7.5 % GlwP AAA face daily - bid 60 g 3    ALPRAZolam (XANAX) 0.25 MG tablet 0.25 mg.      citalopram (CELEXA) 40 MG tablet 40 mg.      DULoxetine (CYMBALTA) 20 MG capsule Take 20 mg by mouth once daily.      fluticasone (FLONASE) 50 mcg/actuation nasal spray 2 sprays (100 mcg total) by Each Nare route once daily. 1 " Bottle 0    methocarbamoL (ROBAXIN) 500 MG Tab TAKE 1 TABLET (500 MG TOTAL) BY MOUTH EVERY 8 (EIGHT) HOURS AS NEEDED (SPASM/PAIN). 30 tablet 1    mirtazapine (REMERON) 7.5 MG Tab Take 7.5 mg by mouth every evening.      tretinoin (RETIN-A) 0.05 % cream Apply to face QHS. Wash off in morning. 30 g 2    ALPRAZolam (XANAX) 0.25 MG tablet       ALPRAZolam (XANAX) 0.5 MG tablet TAKE HALF OF A TABLET (0.25 MG) BY MOUTH NIGHTLY AS NEEDED FOR INSOMNIA  0    citalopram (CELEXA) 40 MG tablet   1    fluconazole (DIFLUCAN) 150 MG Tab Take 1 tablet by mouth today and repeat dose on Monday 2 tablet 0    methylPREDNISolone (MEDROL DOSEPACK) 4 mg tablet use as directed 1 Package 0    tobramycin sulfate 0.3% (TOBREX) 0.3 % ophthalmic solution 1-2 drops topically twice daily to affected toe(s). 5 mL 3     No current facility-administered medications on file prior to visit.       Family History   Problem Relation Age of Onset    Ulcerative colitis Father     Heart disease Father     Diabetes Father     Hyperlipidemia Father     Cataracts Father     Hyperlipidemia Mother     Hashimoto's thyroiditis Mother     Ulcerative colitis Paternal Aunt     Breast cancer Paternal Aunt     Diabetes Maternal Grandmother     Cancer Maternal Grandfather         lung cancer     Ulcerative colitis Paternal Grandmother     Cancer Paternal Grandmother         esophageal cancer     Celiac disease Neg Hx     Crohn's disease Neg Hx     Ovarian cancer Neg Hx     Colon cancer Neg Hx     Melanoma Neg Hx     Amblyopia Neg Hx     Blindness Neg Hx     Glaucoma Neg Hx     Macular degeneration Neg Hx     Retinal detachment Neg Hx     Strabismus Neg Hx        Social History     Tobacco Use    Smoking status: Never Smoker    Smokeless tobacco: Never Used   Substance Use Topics    Alcohol use: Yes     Comment: socially    Drug use: No        Review of Systems:  ROS:  GENERAL: No fever, chills, fatigability or weight  "loss.  Integument:  No rashes, redness, icterus  CHEST: Denies GARCIA, cyanosis, wheezing, cough and sputum production.  CARDIOVASCULAR: Denies chest pain, PND, orthopnea or reduced exercise tolerance.  GI:  Denies abd pain, dysphagia, nausea, vomiting, no hematemesis, no rectal pain  : Denies burning on urination, no hematuria, no bacteriuria  MSK:  No deformities, swelling, joint pain swelling  Neurologic:  No HAs, seizures, weakness, paresthesias, gait problems      OBJECTIVE:     Vital Signs (Most Recent)  BP (!) 116/57 (BP Location: Right arm, Patient Position: Sitting, BP Method: Medium (Automatic))   Pulse 88   Ht 5' 3" (1.6 m)   Wt 72.4 kg (159 lb 11.2 oz)   BMI 28.29 kg/m²     Physical Exam:  General: White female in no distress   Skin/ Sclera anicteric  HEENT: anicteric, normocephalic, extraocular movements intact   Neck trachea midline  Chest symmetric, nl excursions, no retractions  Respiratory: respirations are even and unlabored     Anorectal:   Inspection       Tender over coccyx.    LUKE:  nl tone, no masses, good squeeze, nl relaxation with Valsalva  Extremities: Warm dry and intact.  NO CCE  Neuro: alert and oriented x 4.  Moves all extremities.         ASSESSMENT/PLAN:   coccygodynia      Recommend  Avoid sitting directly on tailbone  Pillows  Mobic for 3 weeks  OV 6 weeks      Anoscopy Procedure Note:   Verbal consent obtained    Indications:  Anorectal pain    Post procedure diagnosis:  Same, coccygodynia    Procedure: anoscopy    Surgeon CRUZ    Assistant: Brittany    Specimen none    Findings:  No signs of pilonidal cyst.  No mass.  Pain over coccyx.      Right posterior hemorrhoid grade 2  Right anterior hemorrhoid grade 1  Left lateral hemorrhoid grade 1    Pt tolerated procedure well.      No complications.          "

## 2022-03-10 ENCOUNTER — PATIENT MESSAGE (OUTPATIENT)
Dept: OBSTETRICS AND GYNECOLOGY | Facility: CLINIC | Age: 32
End: 2022-03-10
Payer: COMMERCIAL

## 2022-03-16 ENCOUNTER — PATIENT MESSAGE (OUTPATIENT)
Dept: ADMINISTRATIVE | Facility: HOSPITAL | Age: 32
End: 2022-03-16
Payer: COMMERCIAL

## 2022-04-14 RX ORDER — FLUOXETINE 10 MG/1
10 CAPSULE ORAL DAILY
Qty: 90 CAPSULE | Refills: 0 | Status: SHIPPED | OUTPATIENT
Start: 2022-04-14 | End: 2022-12-18 | Stop reason: ALTCHOICE

## 2022-04-18 DIAGNOSIS — M53.3 COCCYGODYNIA: Primary | ICD-10-CM

## 2022-04-18 RX ORDER — MELOXICAM 15 MG/1
15 TABLET ORAL DAILY
Qty: 14 TABLET | Refills: 0 | Status: SHIPPED | OUTPATIENT
Start: 2022-04-18 | End: 2022-04-27 | Stop reason: SDUPTHER

## 2022-04-26 RX ORDER — FLUOXETINE 10 MG/1
10 CAPSULE ORAL DAILY
Qty: 90 CAPSULE | Refills: 0 | OUTPATIENT
Start: 2022-04-26

## 2022-04-27 ENCOUNTER — OFFICE VISIT (OUTPATIENT)
Dept: SURGERY | Facility: CLINIC | Age: 32
End: 2022-04-27
Payer: COMMERCIAL

## 2022-04-27 VITALS
DIASTOLIC BLOOD PRESSURE: 74 MMHG | HEART RATE: 86 BPM | WEIGHT: 159.31 LBS | SYSTOLIC BLOOD PRESSURE: 108 MMHG | HEIGHT: 63 IN | BODY MASS INDEX: 28.23 KG/M2

## 2022-04-27 DIAGNOSIS — M53.3 COCCYGODYNIA: Primary | ICD-10-CM

## 2022-04-27 PROCEDURE — 3074F SYST BP LT 130 MM HG: CPT | Mod: CPTII,S$GLB,, | Performed by: COLON & RECTAL SURGERY

## 2022-04-27 PROCEDURE — 3078F PR MOST RECENT DIASTOLIC BLOOD PRESSURE < 80 MM HG: ICD-10-PCS | Mod: CPTII,S$GLB,, | Performed by: COLON & RECTAL SURGERY

## 2022-04-27 PROCEDURE — 1159F MED LIST DOCD IN RCRD: CPT | Mod: CPTII,S$GLB,, | Performed by: COLON & RECTAL SURGERY

## 2022-04-27 PROCEDURE — 99999 PR PBB SHADOW E&M-EST. PATIENT-LVL III: ICD-10-PCS | Mod: PBBFAC,,, | Performed by: COLON & RECTAL SURGERY

## 2022-04-27 PROCEDURE — 3008F BODY MASS INDEX DOCD: CPT | Mod: CPTII,S$GLB,, | Performed by: COLON & RECTAL SURGERY

## 2022-04-27 PROCEDURE — 3008F PR BODY MASS INDEX (BMI) DOCUMENTED: ICD-10-PCS | Mod: CPTII,S$GLB,, | Performed by: COLON & RECTAL SURGERY

## 2022-04-27 PROCEDURE — 99213 OFFICE O/P EST LOW 20 MIN: CPT | Mod: S$GLB,,, | Performed by: COLON & RECTAL SURGERY

## 2022-04-27 PROCEDURE — 1159F PR MEDICATION LIST DOCUMENTED IN MEDICAL RECORD: ICD-10-PCS | Mod: CPTII,S$GLB,, | Performed by: COLON & RECTAL SURGERY

## 2022-04-27 PROCEDURE — 99999 PR PBB SHADOW E&M-EST. PATIENT-LVL III: CPT | Mod: PBBFAC,,, | Performed by: COLON & RECTAL SURGERY

## 2022-04-27 PROCEDURE — 99213 PR OFFICE/OUTPT VISIT, EST, LEVL III, 20-29 MIN: ICD-10-PCS | Mod: S$GLB,,, | Performed by: COLON & RECTAL SURGERY

## 2022-04-27 PROCEDURE — 3074F PR MOST RECENT SYSTOLIC BLOOD PRESSURE < 130 MM HG: ICD-10-PCS | Mod: CPTII,S$GLB,, | Performed by: COLON & RECTAL SURGERY

## 2022-04-27 PROCEDURE — 3078F DIAST BP <80 MM HG: CPT | Mod: CPTII,S$GLB,, | Performed by: COLON & RECTAL SURGERY

## 2022-04-27 RX ORDER — CLONAZEPAM 1 MG/1
1 TABLET ORAL 3 TIMES DAILY
COMMUNITY
End: 2023-01-02

## 2022-04-27 RX ORDER — MELOXICAM 15 MG/1
15 TABLET ORAL DAILY
Qty: 42 TABLET | Refills: 0 | Status: SHIPPED | OUTPATIENT
Start: 2022-04-27 | End: 2022-06-08

## 2022-04-27 NOTE — PROGRESS NOTES
"    CRS Office Visit History and Physical    Referring Md:   No referring provider defined for this encounter.    SUBJECTIVE:     Chief Complaint: pilonidal cyst     History of Present Illness:  Patient reports a several month history of paying over the sakel area. She is notices when driving back-and-forth from [place] to [place]. She feels some swelling and discomfort when she sits. It has never been red. It has never drain pus. She denies ever falling on her tailbone. She denies any problems moving bowels.  No pain.  No blood.  No prolapse.    Can reproduce pain on "glute sets" when sitting - contraction of gluteal muscles.    Last Colonoscopy: never    Interval History 4/27/22  Patient doing well today. She reports the Mobic improves her symptoms, however she has no been taking it regularly because she does not like the idea of a long term medication. She does not have any adverse reactions to the medications. Her pain continues to be positional and is not related to bowel movements. She has had some recent constipation.     Past Medical History:   Diagnosis Date    ALL (acute lymphoblastic leukemia)     Allergy     Anxiety     Depression     Fever blister     History of cholecystectomy     Hypogammaglobulinaemia, unspecified     Irritable bowel syndrome        Past Surgical History:   Procedure Laterality Date    BONE MARROW BIOPSY      Central port and subsequent removal      CHOLECYSTECTOMY  8/11    lap    INCISION AND DRAINAGE OF WOUND      [employer]    INTRATHECAL PUMP IMPLANTATION      for chemo    TONSILLECTOMY         Review of patient's allergies indicates:   Allergen Reactions    Cephalosporins Hives     duracef only    Vitamin k analogues     Duricef [cefadroxil] Hives     Hives and fever    Vitamin k      Other reaction(s): Discolored leg skin    Bactrim [sulfamethoxazole-trimethoprim] Nausea And Vomiting       Current Outpatient Medications on File Prior to Visit   Medication " Sig Dispense Refill    ALPRAZolam (XANAX) 0.25 MG tablet 0.25 mg.      citalopram (CELEXA) 40 MG tablet 40 mg.      clonazePAM (KLONOPIN) 1 MG tablet Take 1 mg by mouth 3 (three) times daily.      DULoxetine (CYMBALTA) 20 MG capsule Take 20 mg by mouth once daily.      fluticasone (FLONASE) 50 mcg/actuation nasal spray 2 sprays (100 mcg total) by Each Nare route once daily. 1 Bottle 0    meloxicam (MOBIC) 15 MG tablet Take 1 tablet (15 mg total) by mouth once daily. for 14 days 14 tablet 0    methocarbamoL (ROBAXIN) 500 MG Tab TAKE 1 TABLET (500 MG TOTAL) BY MOUTH EVERY 8 (EIGHT) HOURS AS NEEDED (SPASM/PAIN). 30 tablet 1    mirtazapine (REMERON) 7.5 MG Tab Take 7.5 mg by mouth every evening.      ACZONE 7.5 % GlwP AAA face daily - bid (Patient not taking: Reported on 4/27/2022) 60 g 3    tretinoin (RETIN-A) 0.05 % cream Apply to face QHS. Wash off in morning. (Patient not taking: Reported on 4/27/2022) 30 g 2     No current facility-administered medications on file prior to visit.       Family History   Problem Relation Age of Onset    Ulcerative colitis Father     Heart disease Father     Diabetes Father     Hyperlipidemia Father     Cataracts Father     Hyperlipidemia Mother     Hashimoto's thyroiditis Mother     Ulcerative colitis Paternal Aunt     Breast cancer Paternal Aunt     Diabetes Maternal Grandmother     Cancer Maternal Grandfather         lung cancer     Ulcerative colitis Paternal Grandmother     Cancer Paternal Grandmother         esophageal cancer     Celiac disease Neg Hx     Crohn's disease Neg Hx     Ovarian cancer Neg Hx     Colon cancer Neg Hx     Melanoma Neg Hx     Amblyopia Neg Hx     Blindness Neg Hx     Glaucoma Neg Hx     Macular degeneration Neg Hx     Retinal detachment Neg Hx     Strabismus Neg Hx        Social History     Tobacco Use    Smoking status: Never Smoker    Smokeless tobacco: Never Used   Substance Use Topics    Alcohol use: Yes      "Comment: socially    Drug use: No        Review of Systems:  ROS:  GENERAL: No fever, chills, fatigability or weight loss.  Integument:  No rashes, redness, icterus  CHEST: Denies GARCIA, cyanosis, wheezing, cough and sputum production.  CARDIOVASCULAR: Denies chest pain, PND, orthopnea or reduced exercise tolerance.  GI:  Denies abd pain, dysphagia, nausea, vomiting, no hematemesis, no rectal pain  : Denies burning on urination, no hematuria, no bacteriuria  MSK:  No deformities, swelling, joint pain swelling  Neurologic:  No HAs, seizures, weakness, paresthesias, gait problems      OBJECTIVE:     Vital Signs (Most Recent)  /74 (BP Location: Right arm, Patient Position: Sitting, BP Method: Small (Automatic))   Pulse 86   Ht 5' 3" (1.6 m)   Wt 72.3 kg (159 lb 4.8 oz)   BMI 28.22 kg/m²     Physical Exam:  General: White female in no distress   Skin/ Sclera anicteric  HEENT: anicteric, normocephalic, extraocular movements intact   Neck trachea midline  Chest symmetric, nl excursions, no retractions  Respiratory: respirations are even and unlabored     Anorectal:   Inspection:  Tender over coccyx.    LUKE:  nl tone, no masses, good squeeze, nl relaxation with Valsalva, internal hemorrhoid present with stool in the rectal vault  Extremities: Warm dry and intact.  NO CCE  Neuro: alert and oriented x 4.  Moves all extremities.         ASSESSMENT/PLAN:   coccygodynia      Recommend  - Avoid sitting directly on tailbone  - Pillows  - Continue Mobic  - Pelvic ultrasound      Anoscopy Procedure Note:   Verbal consent obtained    Indications:  Anorectal pain    Post procedure diagnosis:  Same, coccygodynia    Procedure: anoscopy    Surgeon CRUZ    Assistant: Brittany    Specimen none    Findings: No external hemorrhoids, no fissures.  No abscess    Pt tolerated procedure well.      No complications.            "

## 2022-05-15 ENCOUNTER — OFFICE VISIT (OUTPATIENT)
Dept: URGENT CARE | Facility: CLINIC | Age: 32
End: 2022-05-15
Payer: COMMERCIAL

## 2022-05-15 VITALS
SYSTOLIC BLOOD PRESSURE: 118 MMHG | HEART RATE: 99 BPM | WEIGHT: 157 LBS | HEIGHT: 63 IN | TEMPERATURE: 100 F | DIASTOLIC BLOOD PRESSURE: 68 MMHG | OXYGEN SATURATION: 100 % | BODY MASS INDEX: 27.82 KG/M2 | RESPIRATION RATE: 14 BRPM

## 2022-05-15 DIAGNOSIS — R50.9 FEVER IN ADULT: ICD-10-CM

## 2022-05-15 DIAGNOSIS — R09.82 PND (POST-NASAL DRIP): ICD-10-CM

## 2022-05-15 DIAGNOSIS — R09.81 SINUS CONGESTION: ICD-10-CM

## 2022-05-15 DIAGNOSIS — R52 BODY ACHES: ICD-10-CM

## 2022-05-15 DIAGNOSIS — J02.9 SORE THROAT: Primary | ICD-10-CM

## 2022-05-15 DIAGNOSIS — R51.9 SINUS HEADACHE: ICD-10-CM

## 2022-05-15 LAB
CTP QC/QA: YES
CTP QC/QA: YES
MOLECULAR STREP A: NEGATIVE
POC MOLECULAR INFLUENZA A AGN: NEGATIVE
POC MOLECULAR INFLUENZA B AGN: NEGATIVE

## 2022-05-15 PROCEDURE — 3078F DIAST BP <80 MM HG: CPT | Mod: CPTII,S$GLB,, | Performed by: PHYSICIAN ASSISTANT

## 2022-05-15 PROCEDURE — 1159F MED LIST DOCD IN RCRD: CPT | Mod: CPTII,S$GLB,, | Performed by: PHYSICIAN ASSISTANT

## 2022-05-15 PROCEDURE — 1160F PR REVIEW ALL MEDS BY PRESCRIBER/CLIN PHARMACIST DOCUMENTED: ICD-10-PCS | Mod: CPTII,S$GLB,, | Performed by: PHYSICIAN ASSISTANT

## 2022-05-15 PROCEDURE — 87502 INFLUENZA DNA AMP PROBE: CPT | Mod: QW,S$GLB,, | Performed by: PHYSICIAN ASSISTANT

## 2022-05-15 PROCEDURE — 3074F SYST BP LT 130 MM HG: CPT | Mod: CPTII,S$GLB,, | Performed by: PHYSICIAN ASSISTANT

## 2022-05-15 PROCEDURE — 3008F BODY MASS INDEX DOCD: CPT | Mod: CPTII,S$GLB,, | Performed by: PHYSICIAN ASSISTANT

## 2022-05-15 PROCEDURE — 87502 POCT INFLUENZA A/B MOLECULAR: ICD-10-PCS | Mod: QW,S$GLB,, | Performed by: PHYSICIAN ASSISTANT

## 2022-05-15 PROCEDURE — 87651 STREP A DNA AMP PROBE: CPT | Mod: QW,S$GLB,, | Performed by: PHYSICIAN ASSISTANT

## 2022-05-15 PROCEDURE — 1159F PR MEDICATION LIST DOCUMENTED IN MEDICAL RECORD: ICD-10-PCS | Mod: CPTII,S$GLB,, | Performed by: PHYSICIAN ASSISTANT

## 2022-05-15 PROCEDURE — 3008F PR BODY MASS INDEX (BMI) DOCUMENTED: ICD-10-PCS | Mod: CPTII,S$GLB,, | Performed by: PHYSICIAN ASSISTANT

## 2022-05-15 PROCEDURE — 87651 POCT STREP A MOLECULAR: ICD-10-PCS | Mod: QW,S$GLB,, | Performed by: PHYSICIAN ASSISTANT

## 2022-05-15 PROCEDURE — 3074F PR MOST RECENT SYSTOLIC BLOOD PRESSURE < 130 MM HG: ICD-10-PCS | Mod: CPTII,S$GLB,, | Performed by: PHYSICIAN ASSISTANT

## 2022-05-15 PROCEDURE — 99214 OFFICE O/P EST MOD 30 MIN: CPT | Mod: S$GLB,,, | Performed by: PHYSICIAN ASSISTANT

## 2022-05-15 PROCEDURE — 3078F PR MOST RECENT DIASTOLIC BLOOD PRESSURE < 80 MM HG: ICD-10-PCS | Mod: CPTII,S$GLB,, | Performed by: PHYSICIAN ASSISTANT

## 2022-05-15 PROCEDURE — 1160F RVW MEDS BY RX/DR IN RCRD: CPT | Mod: CPTII,S$GLB,, | Performed by: PHYSICIAN ASSISTANT

## 2022-05-15 PROCEDURE — 99214 PR OFFICE/OUTPT VISIT, EST, LEVL IV, 30-39 MIN: ICD-10-PCS | Mod: S$GLB,,, | Performed by: PHYSICIAN ASSISTANT

## 2022-05-15 RX ORDER — AZELASTINE 1 MG/ML
1 SPRAY, METERED NASAL 2 TIMES DAILY
Qty: 30 ML | Refills: 0 | Status: SHIPPED | OUTPATIENT
Start: 2022-05-15 | End: 2022-06-30

## 2022-05-15 RX ORDER — ACETAMINOPHEN 325 MG/1
325 TABLET ORAL
Status: COMPLETED | OUTPATIENT
Start: 2022-05-15 | End: 2022-05-15

## 2022-05-15 RX ORDER — MINERAL OIL
180 ENEMA (ML) RECTAL DAILY
Qty: 30 TABLET | Refills: 0 | Status: SHIPPED | OUTPATIENT
Start: 2022-05-15 | End: 2022-06-30

## 2022-05-15 RX ADMIN — ACETAMINOPHEN 325 MG: 325 TABLET ORAL at 09:05

## 2022-05-15 NOTE — PROGRESS NOTES
"Subjective:       Patient ID: Terri Tavera is a 32 y.o. female.    Vitals:  height is 5' 3" (1.6 m) and weight is 71.2 kg (157 lb). Her tympanic temperature is 100 °F (37.8 °C). Her blood pressure is 118/68 and her pulse is 99. Her respiration is 14 and oxygen saturation is 100%.     Chief Complaint: Sore Throat    Pt c/o sore throat, body aches, [place] in back of throat, fever (100.7) , discomfort swallowing starting yesterday.     Sore Throat   This is a new problem. The current episode started yesterday. The problem has been unchanged. Neither side of throat is experiencing more pain than the other. The maximum temperature recorded prior to her arrival was 100.4 - 100.9 F (100.7). The pain is at a severity of 6/10. The pain is moderate. Associated symptoms include congestion, headaches, swollen glands and trouble swallowing. Pertinent negatives include no abdominal pain, coughing, diarrhea, drooling, ear discharge, ear pain, hoarse voice (scratchy/irritation), plugged ear sensation, neck pain, shortness of breath, stridor or vomiting. She has had no exposure to strep or mono. She has tried acetaminophen and NSAIDs for the symptoms. The treatment provided no relief.       HENT: Positive for congestion, sore throat and trouble swallowing. Negative for ear pain, ear discharge and drooling.    Neck: Negative for neck pain.   Respiratory: Negative for cough, shortness of breath and stridor.    Gastrointestinal: Negative for abdominal pain, vomiting and diarrhea.   Skin: Negative for erythema.   Neurological: Positive for headaches.       Objective:       Vitals:    05/15/22 0913   BP: 118/68   Pulse: 99   Resp: 14   Temp: 100 °F (37.8 °C)   TempSrc: Tympanic   SpO2: 100%   Weight: 71.2 kg (157 lb)   Height: 5' 3" (1.6 m)       Physical Exam   Constitutional: She is oriented to person, place, and time. She appears well-developed. She is cooperative.  Non-toxic appearance. She does not appear ill. No distress. " awake  HENT:   Head: Normocephalic and atraumatic.   Ears:   Right Ear: Hearing, tympanic membrane, external ear and ear canal normal. No drainage. impacted cerumen  Left Ear: Hearing, tympanic membrane, external ear and ear canal normal. No drainage. impacted cerumen  Nose: Congestion present. No mucosal edema, rhinorrhea, purulent discharge or nasal deformity. No epistaxis. Right sinus exhibits no maxillary sinus tenderness and no frontal sinus tenderness. Left sinus exhibits no maxillary sinus tenderness and no frontal sinus tenderness.   Mouth/Throat: Uvula is midline and mucous membranes are normal. Mucous membranes are moist. No oral lesions. No trismus in the jaw. Normal dentition. No uvula swelling. Posterior oropharyngeal erythema present. No oropharyngeal exudate, posterior oropharyngeal edema, tonsillar abscesses or cobblestoning. Tonsils are 0 on the right. Tonsils are 0 on the left. No tonsillar exudate. Oropharynx is clear.      Comments: PND  Eyes: Conjunctivae and lids are normal. Pupils are equal, round, and reactive to light. No visual field deficit is present. Right eye exhibits no discharge. Left eye exhibits no discharge. No scleral icterus. Right eye exhibits no nystagmus. Left eye exhibits no nystagmus. Extraocular movement intact vision grossly intact gaze aligned appropriately periorbital hyperpigmentation  Neck: Trachea normal and phonation normal. Neck supple. No neck rigidity present.   Cardiovascular: Normal rate, regular rhythm, S1 normal, S2 normal, normal heart sounds and normal pulses. Exam reveals no decreased pulses.   Pulmonary/Chest: Effort normal and breath sounds normal. No accessory muscle usage or stridor. No tachypnea and no bradypnea. No respiratory distress. She has no decreased breath sounds. She has no wheezes. She has no rhonchi. She has no rales. She exhibits no tenderness.   Abdominal: Normal appearance and bowel sounds are normal. She exhibits no distension, no  pulsatile midline mass and no mass. Soft. There is no abdominal tenderness. There is no rebound, no guarding, no left CVA tenderness and no right CVA tenderness.   Musculoskeletal: Normal range of motion.         General: No deformity. Normal range of motion.      Cervical back: She exhibits no tenderness.      Right lower leg: No edema.      Left lower leg: No edema.   Lymphadenopathy:     She has no cervical adenopathy.   Neurological: no focal deficit. She is alert, oriented to person, place, and time and at baseline. She has normal sensation and intact cranial nerves. She displays no weakness and no dysarthria. No cranial nerve deficit. She exhibits normal muscle tone. Gait normal. Coordination and gait normal.   Skin: Skin is warm, dry, intact, not diaphoretic, not pale and no rash. Capillary refill takes less than 2 seconds. No erythema and No lesion   Psychiatric: Her speech is normal and behavior is normal. Mood, judgment and thought content normal.   Nursing note and vitals reviewed.        Assessment:       1. Sore throat    2. Fever in adult    3. Body aches    4. Sinus headache    5. Sinus congestion    6. PND (post-nasal drip)        Results for orders placed or performed in visit on 05/15/22   POCT Strep A, Molecular   Result Value Ref Range    Molecular Strep A, POC Negative Negative     Acceptable Yes    POCT Influenza A/B MOLECULAR   Result Value Ref Range    POC Molecular Influenza A Ag Negative Negative, Not Reported    POC Molecular Influenza B Ag Negative Negative, Not Reported     Acceptable Yes        Plan:         Sore throat  -     POCT Strep A, Molecular    Fever in adult  -     acetaminophen tablet 325 mg    Body aches  -     POCT Influenza A/B MOLECULAR    Sinus headache    Sinus congestion  -     azelastine (ASTELIN) 137 mcg (0.1 %) nasal spray; 1 spray (137 mcg total) by Nasal route 2 (two) times daily.  Dispense: 30 mL; Refill: 0  -     fexofenadine  (ALLEGRA) 180 MG tablet; Take 1 tablet (180 mg total) by mouth once daily.  Dispense: 30 tablet; Refill: 0    PND (post-nasal drip)  -     azelastine (ASTELIN) 137 mcg (0.1 %) nasal spray; 1 spray (137 mcg total) by Nasal route 2 (two) times daily.  Dispense: 30 mL; Refill: 0  -     fexofenadine (ALLEGRA) 180 MG tablet; Take 1 tablet (180 mg total) by mouth once daily.  Dispense: 30 tablet; Refill: 0           Medical Decision Making:   Differential Diagnosis:   Acute nasopharyngitis  Strep  Flu  COVID  Bacterial URI  Urgent Care Management:  Patient requested strep and flu tests today.  Declined COVID concerns.       Patient Instructions   SORE THROAT:  Strep neg!  You may gargle with hot salt water 4 times a day for the next 2 days and then you may also gargle diluted hydrogen peroxide once to twice daily to alleviate some of your throat discomfort.  Drink plenty of fluids, recommend warm tea with honey.     YOU MAY USE OVER-THE-COUNTER CEPACOL FOR SOOTHING OF YOUR THROAT.  You may wish to avoid spicy food, citrus fruits, and red sauces- as this may irritate the throat more.    You can also take a daily anti-histamine such as Zyrtec, Claritin, Xyzal, OR Allegra-IN DAYTIME; NON DROWSY) AND/OR Benadryl- AT NIGHT; DROWSY) to help with runny nose/sneezing/sore throat/cough.    If your symptoms do not improve, you should return to this clinic. If your symptoms worsen, go to the emergency room.     HEADACHE:  FLU neg   - Drink plenty of fluids to remain hydrated.   - You can alternate over-the-counter Tylenol and Ibuprofen as needed for headache, as long as you don't have any allergies to these medications or medical conditions such as ulcers, liver or kidney disease or blood thinners etc that would prevent you from taking these meds.   - Get plenty rest.   - Slowly change from laying, sitting, and standing positions to help lightheadedness/dizziness.  - Sit or lie down immediately when dizziness/lightheaded to avoid  further injury.   - Watch for increase pain, fever, vomiting, neck pain or mental confusion.    - If your condition worsens or fails to improve we recommend that you receive another evaluation at the ER immediately or contact your PCP to discuss your concerns or return here.        VIRAL URI: OVER THE COUNTER RECOMMENDATIONS/SUGGESTIONS--if needed    Remember to switch antihistamines every 3 months, if taken daily.     Make sure to stay well hydrated.    Use Nasal Saline to mechanically move any post nasal drip from your eustachian tube or from the back of your throat.    Use hot salt water gargles or gargle diluted hydrogen peroxide to ease your throat pain. 1/2 tsp salt to 1 cup warm water, gargle as desired.  Follow directions on the back of hydrogen peroxide bottle for gargles.    You may insert a whole garlic cloves into your nostrils and leave for 10-15 minutes. When you remove them, mucus will be pulled down. This may burn as garlic is strong.  Repeat as often as needed and able to tolerate.  Please do not use garlic if you have an allergy to garlic.    Use pseudoephedrine (behind the counter) to decongest. Pseudoephedrine  30 mg up to 240 mg /day. *BE AWARE- It can raise your blood pressure and give you palpitations.  Use mucinex (guaifenisin) to break up mucous up to 2400mg/day to loosen any mucous.   The mucinex DM pill has a cough suppressant that can be sedating. It can be used at night to stop the tickle at the back of your throat.  You can use Mucinex D (it has guaifenesin and a high dose of pseudoephedrine) in the mornings to help decongest.    Sometimes Nyquil at night is beneficial to help you get some rest, however it is sedating and it does have an antihistamine and tylenol.    Honey is a natural cough suppressant that can be used.    Tylenol up to 4,000 mg a day is safe for short periods and can be used for headache, body aches, pain, and fever. However in high doses and prolonged use it can  cause liver irritation.    Ibuprofen is a non-steroidal anti-inflammatory that can be used for headache, body aches, pain, and fever.However it can also cause stomach irritation if over used.    - You must understand that you have received an Urgent Care treatment only and that you may be released before all of your medical problems are known or treated.   - You, the patient, will arrange for follow up care as instructed with your primary care provider or recommended specialist.   - If your condition worsens or fails to improve we recommend that you receive another evaluation at the ER immediately or contact your PCP to discuss your concerns, or return here.   - Please do not drive or make any important decisions for 24 hours if you have received any pain medications, sedatives or mood altering drugs during your visit.    Disclaimer: This document was drafted with the use of a voice recognition device and is likely to have sound alike errors.

## 2022-05-15 NOTE — PATIENT INSTRUCTIONS
SORE THROAT:  Strep neg!  You may gargle with hot salt water 4 times a day for the next 2 days and then you may also gargle diluted hydrogen peroxide once to twice daily to alleviate some of your throat discomfort.  Drink plenty of fluids, recommend warm tea with honey.     YOU MAY USE OVER-THE-COUNTER CEPACOL FOR SOOTHING OF YOUR THROAT.  You may wish to avoid spicy food, citrus fruits, and red sauces- as this may irritate the throat more.    You can also take a daily anti-histamine such as Zyrtec, Claritin, Xyzal, OR Allegra-IN DAYTIME; NON DROWSY) AND/OR Benadryl- AT NIGHT; DROWSY) to help with runny nose/sneezing/sore throat/cough.    If your symptoms do not improve, you should return to this clinic. If your symptoms worsen, go to the emergency room.     HEADACHE:  FLU neg   - Drink plenty of fluids to remain hydrated.   - You can alternate over-the-counter Tylenol and Ibuprofen as needed for headache, as long as you don't have any allergies to these medications or medical conditions such as ulcers, liver or kidney disease or blood thinners etc that would prevent you from taking these meds.   - Get plenty rest.   - Slowly change from laying, sitting, and standing positions to help lightheadedness/dizziness.  - Sit or lie down immediately when dizziness/lightheaded to avoid further injury.   - Watch for increase pain, fever, vomiting, neck pain or mental confusion.    - If your condition worsens or fails to improve we recommend that you receive another evaluation at the ER immediately or contact your PCP to discuss your concerns or return here.        VIRAL URI: OVER THE COUNTER RECOMMENDATIONS/SUGGESTIONS--if needed    Remember to switch antihistamines every 3 months, if taken daily.     Make sure to stay well hydrated.    Use Nasal Saline to mechanically move any post nasal drip from your eustachian tube or from the back of your throat.    Use hot salt water gargles or gargle diluted hydrogen peroxide to ease  your throat pain. 1/2 tsp salt to 1 cup warm water, gargle as desired.  Follow directions on the back of hydrogen peroxide bottle for gargles.    You may insert a whole garlic cloves into your nostrils and leave for 10-15 minutes. When you remove them, mucus will be pulled down. This may burn as garlic is strong.  Repeat as often as needed and able to tolerate.  Please do not use garlic if you have an allergy to garlic.    Use pseudoephedrine (behind the counter) to decongest. Pseudoephedrine  30 mg up to 240 mg /day. *BE AWARE- It can raise your blood pressure and give you palpitations.  Use mucinex (guaifenisin) to break up mucous up to 2400mg/day to loosen any mucous.   The mucinex DM pill has a cough suppressant that can be sedating. It can be used at night to stop the tickle at the back of your throat.  You can use Mucinex D (it has guaifenesin and a high dose of pseudoephedrine) in the mornings to help decongest.    Sometimes Nyquil at night is beneficial to help you get some rest, however it is sedating and it does have an antihistamine and tylenol.    Honey is a natural cough suppressant that can be used.    Tylenol up to 4,000 mg a day is safe for short periods and can be used for headache, body aches, pain, and fever. However in high doses and prolonged use it can cause liver irritation.    Ibuprofen is a non-steroidal anti-inflammatory that can be used for headache, body aches, pain, and fever.However it can also cause stomach irritation if over used.    - You must understand that you have received an Urgent Care treatment only and that you may be released before all of your medical problems are known or treated.   - You, the patient, will arrange for follow up care as instructed with your primary care provider or recommended specialist.   - If your condition worsens or fails to improve we recommend that you receive another evaluation at the ER immediately or contact your PCP to discuss your concerns, or  return here.   - Please do not drive or make any important decisions for 24 hours if you have received any pain medications, sedatives or mood altering drugs during your visit.    Disclaimer: This document was drafted with the use of a voice recognition device and is likely to have sound alike errors.

## 2022-05-16 ENCOUNTER — OFFICE VISIT (OUTPATIENT)
Dept: URGENT CARE | Facility: CLINIC | Age: 32
End: 2022-05-16
Payer: COMMERCIAL

## 2022-05-16 VITALS
SYSTOLIC BLOOD PRESSURE: 114 MMHG | WEIGHT: 157 LBS | BODY MASS INDEX: 27.82 KG/M2 | TEMPERATURE: 98 F | DIASTOLIC BLOOD PRESSURE: 69 MMHG | RESPIRATION RATE: 18 BRPM | HEART RATE: 116 BPM | HEIGHT: 63 IN | OXYGEN SATURATION: 96 %

## 2022-05-16 DIAGNOSIS — B96.89 BACTERIAL PHARYNGITIS: Primary | ICD-10-CM

## 2022-05-16 DIAGNOSIS — R53.83 FATIGUE, UNSPECIFIED TYPE: ICD-10-CM

## 2022-05-16 DIAGNOSIS — R50.9 FEVER, UNSPECIFIED FEVER CAUSE: ICD-10-CM

## 2022-05-16 DIAGNOSIS — J02.9 SORE THROAT: ICD-10-CM

## 2022-05-16 DIAGNOSIS — J02.8 BACTERIAL PHARYNGITIS: Primary | ICD-10-CM

## 2022-05-16 LAB
CTP QC/QA: YES
CTP QC/QA: YES
HETEROPH AB SER QL: NEGATIVE
SARS-COV-2 RDRP RESP QL NAA+PROBE: NEGATIVE

## 2022-05-16 PROCEDURE — 3008F BODY MASS INDEX DOCD: CPT | Mod: CPTII,S$GLB,, | Performed by: NURSE PRACTITIONER

## 2022-05-16 PROCEDURE — 86308 POCT INFECTIOUS MONONUCLEOSIS: ICD-10-PCS | Mod: QW,S$GLB,, | Performed by: NURSE PRACTITIONER

## 2022-05-16 PROCEDURE — U0002 COVID-19 LAB TEST NON-CDC: HCPCS | Mod: QW,S$GLB,, | Performed by: NURSE PRACTITIONER

## 2022-05-16 PROCEDURE — U0002: ICD-10-PCS | Mod: QW,S$GLB,, | Performed by: NURSE PRACTITIONER

## 2022-05-16 PROCEDURE — 86308 HETEROPHILE ANTIBODY SCREEN: CPT | Mod: QW,S$GLB,, | Performed by: NURSE PRACTITIONER

## 2022-05-16 PROCEDURE — 1160F PR REVIEW ALL MEDS BY PRESCRIBER/CLIN PHARMACIST DOCUMENTED: ICD-10-PCS | Mod: CPTII,S$GLB,, | Performed by: NURSE PRACTITIONER

## 2022-05-16 PROCEDURE — 99213 OFFICE O/P EST LOW 20 MIN: CPT | Mod: S$GLB,,, | Performed by: NURSE PRACTITIONER

## 2022-05-16 PROCEDURE — 3074F SYST BP LT 130 MM HG: CPT | Mod: CPTII,S$GLB,, | Performed by: NURSE PRACTITIONER

## 2022-05-16 PROCEDURE — 3078F DIAST BP <80 MM HG: CPT | Mod: CPTII,S$GLB,, | Performed by: NURSE PRACTITIONER

## 2022-05-16 PROCEDURE — 3078F PR MOST RECENT DIASTOLIC BLOOD PRESSURE < 80 MM HG: ICD-10-PCS | Mod: CPTII,S$GLB,, | Performed by: NURSE PRACTITIONER

## 2022-05-16 PROCEDURE — 1159F MED LIST DOCD IN RCRD: CPT | Mod: CPTII,S$GLB,, | Performed by: NURSE PRACTITIONER

## 2022-05-16 PROCEDURE — 99213 PR OFFICE/OUTPT VISIT, EST, LEVL III, 20-29 MIN: ICD-10-PCS | Mod: S$GLB,,, | Performed by: NURSE PRACTITIONER

## 2022-05-16 PROCEDURE — 1160F RVW MEDS BY RX/DR IN RCRD: CPT | Mod: CPTII,S$GLB,, | Performed by: NURSE PRACTITIONER

## 2022-05-16 PROCEDURE — 1159F PR MEDICATION LIST DOCUMENTED IN MEDICAL RECORD: ICD-10-PCS | Mod: CPTII,S$GLB,, | Performed by: NURSE PRACTITIONER

## 2022-05-16 PROCEDURE — 3074F PR MOST RECENT SYSTOLIC BLOOD PRESSURE < 130 MM HG: ICD-10-PCS | Mod: CPTII,S$GLB,, | Performed by: NURSE PRACTITIONER

## 2022-05-16 PROCEDURE — 3008F PR BODY MASS INDEX (BMI) DOCUMENTED: ICD-10-PCS | Mod: CPTII,S$GLB,, | Performed by: NURSE PRACTITIONER

## 2022-05-16 RX ORDER — AZITHROMYCIN 250 MG/1
250 TABLET, FILM COATED ORAL DAILY
Qty: 6 TABLET | Refills: 0 | Status: SHIPPED | OUTPATIENT
Start: 2022-05-16 | End: 2022-05-21

## 2022-05-16 NOTE — PROGRESS NOTES
"Subjective:       Patient ID: Terri Tavera is a 32 y.o. female.    Vitals:  height is 5' 3" (1.6 m) and weight is 71.2 kg (157 lb). Her tympanic temperature is 98 °F (36.7 °C). Her blood pressure is 114/69 and her pulse is 116 (abnormal). Her respiration is 18 and oxygen saturation is 96%.     Chief Complaint: No chief complaint on file.    Pt states she began having sore throat 2-3 days ago. Was seen yesterday after developing fever and tested negative for flu and strep. Requesting a covid test.     Fever   This is a new problem. Episode onset: saturday evening  The problem has been unchanged. Maximum temperature: 101.0 today @ 9:30am  The temperature was taken using an oral thermometer. Associated symptoms include muscle aches and a sore throat. Pertinent negatives include no abdominal pain, chest pain, congestion, coughing, diarrhea, ear pain, headaches, nausea, rash, sleepiness, urinary pain, vomiting or wheezing. Treatments tried: Advil & Tylenol  The treatment provided no relief.   Risk factors: no contaminated food, no contaminated water, no hx of cancer, no immunosuppression, no occupational exposure, no recent sickness, no recent travel and no sick contacts        Constitution: Positive for fever.   HENT: Positive for sore throat. Negative for ear pain and congestion.    Cardiovascular: Negative for chest pain.   Respiratory: Negative for cough and wheezing.    Gastrointestinal: Negative for abdominal pain, nausea, vomiting and diarrhea.   Genitourinary: Negative for dysuria.   Skin: Negative for rash.   Neurological: Negative for headaches.       Objective:      Physical Exam   Constitutional: She is oriented to person, place, and time. She appears well-developed. She is cooperative.  Non-toxic appearance. She does not appear ill. No distress.   HENT:   Head: Normocephalic and atraumatic.   Ears:   Right Ear: Hearing, tympanic membrane, external ear and ear canal normal. No middle ear effusion.   Left " Ear: Hearing, tympanic membrane, external ear and ear canal normal.  No middle ear effusion.   Nose: Nose normal. No mucosal edema, rhinorrhea or nasal deformity. No epistaxis. Right sinus exhibits no maxillary sinus tenderness and no frontal sinus tenderness. Left sinus exhibits no maxillary sinus tenderness and no frontal sinus tenderness.   Mouth/Throat: Uvula is midline and mucous membranes are normal. No trismus in the jaw. Normal dentition. No uvula swelling. Posterior oropharyngeal edema and posterior oropharyngeal erythema present. No oropharyngeal exudate. Tonsils are 0 on the right. Tonsils are 0 on the left.   Eyes: Conjunctivae and lids are normal. No scleral icterus.   Neck: Trachea normal and phonation normal. Neck supple. No edema present. No erythema present. No neck rigidity present.   Cardiovascular: Regular rhythm, normal heart sounds and normal pulses. Tachycardia present.   Pulmonary/Chest: Effort normal and breath sounds normal. No respiratory distress. She has no decreased breath sounds. She has no wheezes. She has no rhonchi.   Abdominal: Normal appearance.   Musculoskeletal: Normal range of motion.         General: No deformity. Normal range of motion.   Lymphadenopathy:     She has cervical adenopathy.        Right cervical: Superficial cervical adenopathy present.        Left cervical: Superficial cervical adenopathy present.   Neurological: She is alert and oriented to person, place, and time. She exhibits normal muscle tone. Coordination normal.   Skin: Skin is warm, dry, intact, not diaphoretic and not pale.   Psychiatric: Her speech is normal and behavior is normal. Judgment and thought content normal.   Nursing note and vitals reviewed.        Assessment:       1. Bacterial pharyngitis    2. Sore throat    3. Fever, unspecified fever cause    4. Fatigue, unspecified type          Plan:         Bacterial pharyngitis  -     azithromycin (Z-DARRIUS) 250 MG tablet; Take 1 tablet (250 mg  total) by mouth once daily. Take two tablets on day one and take one tablet daily for the next four days. for 5 days  Dispense: 6 tablet; Refill: 0    Sore throat  -     POCT Infectious mononucleosis antibody    Fever, unspecified fever cause  -     POCT COVID-19 Rapid Screening  -     POCT Infectious mononucleosis antibody    Fatigue, unspecified type  -     POCT COVID-19 Rapid Screening                 Results for orders placed or performed in visit on 05/16/22   POCT COVID-19 Rapid Screening   Result Value Ref Range    POC Rapid COVID Negative Negative     Acceptable Yes    POCT Infectious mononucleosis antibody   Result Value Ref Range    Monospot Negative Negative     Acceptable Yes      Lab result reviewed and discussed with patient.    MDM: Pt has been having fever, absence of cough, pharyngitis and has enlarged, tender cervical lymph nodes. Does not have exudate and tonsils are absent. 3/4 Centor score so antibiotics were prescribed.    · Take antibiotics exactly as prescribed. Do not stop taking antibiotics sooner than instructed in order to prevent recurrence of infection and antibiotic resistance.   · Once your fever has resolved and you have been taking antibiotics for at least 24 hours, you are no longer considered contagious and may return to work or school.   · You may take Tylenol or Ibuprofen as needed for fever, throat pain, or body aches.   · For sore throat, gargling with warm salt water, Cepacol (with benzocaine) throat lozenges, or Chloraseptic spray may help with pain.  · Make sure to get a new toothbrush after you have been on antibiotics for 24-48 hours. Please contact your primary care provider if symptoms do not improve within 2 days or sooner for any new or worsening symptoms.  · Please go to the ER for any worsening in your condition including: hives, rash, increased pain or swelling to throat, persistent fever that does not improve with Tylenol/Motrin use,  dark urine, severe headache, vision changes, neck stiffness, lethargy, or for any other new or concerning symptoms.

## 2022-05-16 NOTE — PATIENT INSTRUCTIONS
Take antibiotics exactly as prescribed. Do not stop taking antibiotics sooner than instructed in order to prevent recurrence of infection and antibiotic resistance.   Once your fever has resolved and you have been taking antibiotics for at least 24 hours, you are no longer considered contagious and may return to work or school.   You may take Tylenol or Ibuprofen as needed for fever, throat pain, or body aches.   For sore throat, gargling with warm salt water, Cepacol (with benzocaine) throat lozenges, or Chloraseptic spray may help with pain.  Make sure to get a new toothbrush after you have been on antibiotics for 24-48 hours. Please contact your primary care provider if symptoms do not improve within 2 days or sooner for any new or worsening symptoms.  Please go to the ER for any worsening in your condition including: hives, rash, increased pain or swelling to throat, persistent fever that does not improve with Tylenol/Motrin use, dark urine, severe headache, vision changes, neck stiffness, lethargy, or for any other new or concerning symptoms.

## 2022-05-18 ENCOUNTER — TELEPHONE (OUTPATIENT)
Dept: URGENT CARE | Facility: CLINIC | Age: 32
End: 2022-05-18
Payer: COMMERCIAL

## 2022-05-19 ENCOUNTER — TELEPHONE (OUTPATIENT)
Dept: URGENT CARE | Facility: CLINIC | Age: 32
End: 2022-05-19
Payer: COMMERCIAL

## 2022-05-19 NOTE — TELEPHONE ENCOUNTER
Courtesy call made to patient. Patient says she is feeling much better. And thank you for the call.

## 2022-11-16 ENCOUNTER — OCC HEALTH (OUTPATIENT)
Dept: OCCUPATIONAL MEDICINE | Age: 32
End: 2022-11-16

## 2022-11-16 VITALS
WEIGHT: 178 LBS | BODY MASS INDEX: 32.76 KG/M2 | HEIGHT: 62 IN | HEART RATE: 80 BPM | DIASTOLIC BLOOD PRESSURE: 84 MMHG | SYSTOLIC BLOOD PRESSURE: 132 MMHG

## 2022-11-16 DIAGNOSIS — Z02.89 VISIT FOR OCCUPATIONAL HEALTH EXAMINATION: Primary | ICD-10-CM

## 2022-11-16 DIAGNOSIS — Z02.89 ENCOUNTER FOR OCCUPATIONAL HEALTH EXAMINATION: ICD-10-CM

## 2022-11-16 PROCEDURE — OH021 PRE PLACEMENT PHYSICAL EXAM: Performed by: PHYSICIAN ASSISTANT

## 2022-11-16 PROCEDURE — 92552 PURE TONE AUDIOMETRY AIR: CPT | Performed by: PHYSICIAN ASSISTANT

## 2022-11-16 PROCEDURE — OH143 RAPID TEST DRUG KIT & COLLECTION 10 PANEL: Performed by: PHYSICIAN ASSISTANT

## 2022-11-16 PROCEDURE — OH063 AUDIOMETRIC TESTING INTERP: Performed by: PHYSICIAN ASSISTANT

## 2022-11-22 ENCOUNTER — OFFICE VISIT (OUTPATIENT)
Dept: URGENT CARE | Facility: CLINIC | Age: 32
End: 2022-11-22
Payer: COMMERCIAL

## 2022-11-22 VITALS
SYSTOLIC BLOOD PRESSURE: 107 MMHG | RESPIRATION RATE: 24 BRPM | HEIGHT: 63 IN | TEMPERATURE: 99 F | BODY MASS INDEX: 34.2 KG/M2 | DIASTOLIC BLOOD PRESSURE: 66 MMHG | HEART RATE: 59 BPM | OXYGEN SATURATION: 100 % | WEIGHT: 193 LBS

## 2022-11-22 DIAGNOSIS — U07.1 COVID-19 AFFECTING PREGNANCY IN SECOND TRIMESTER: Primary | ICD-10-CM

## 2022-11-22 DIAGNOSIS — U07.1 COVID-19 VIRUS DETECTED: ICD-10-CM

## 2022-11-22 DIAGNOSIS — O98.512 COVID-19 AFFECTING PREGNANCY IN SECOND TRIMESTER: Primary | ICD-10-CM

## 2022-11-22 PROBLEM — C80.1 MALIGNANT (PRIMARY) NEOPLASM, UNSPECIFIED: Status: ACTIVE | Noted: 2020-04-02

## 2022-11-22 LAB
CTP QC/QA: YES
MOLECULAR STREP A: NEGATIVE
POC MOLECULAR INFLUENZA A AGN: NEGATIVE
POC MOLECULAR INFLUENZA B AGN: NEGATIVE
SARS-COV-2 RDRP RESP QL NAA+PROBE: POSITIVE

## 2022-11-22 PROCEDURE — 87651 POCT STREP A MOLECULAR: ICD-10-PCS | Mod: QW,S$GLB,, | Performed by: PHYSICIAN ASSISTANT

## 2022-11-22 PROCEDURE — 87651 STREP A DNA AMP PROBE: CPT | Mod: QW,S$GLB,, | Performed by: PHYSICIAN ASSISTANT

## 2022-11-22 PROCEDURE — 1160F RVW MEDS BY RX/DR IN RCRD: CPT | Mod: CPTII,S$GLB,, | Performed by: PHYSICIAN ASSISTANT

## 2022-11-22 PROCEDURE — 3008F PR BODY MASS INDEX (BMI) DOCUMENTED: ICD-10-PCS | Mod: CPTII,S$GLB,, | Performed by: PHYSICIAN ASSISTANT

## 2022-11-22 PROCEDURE — 1159F PR MEDICATION LIST DOCUMENTED IN MEDICAL RECORD: ICD-10-PCS | Mod: CPTII,S$GLB,, | Performed by: PHYSICIAN ASSISTANT

## 2022-11-22 PROCEDURE — 3008F BODY MASS INDEX DOCD: CPT | Mod: CPTII,S$GLB,, | Performed by: PHYSICIAN ASSISTANT

## 2022-11-22 PROCEDURE — 87635: ICD-10-PCS | Mod: QW,S$GLB,, | Performed by: PHYSICIAN ASSISTANT

## 2022-11-22 PROCEDURE — 3078F DIAST BP <80 MM HG: CPT | Mod: CPTII,S$GLB,, | Performed by: PHYSICIAN ASSISTANT

## 2022-11-22 PROCEDURE — 99213 OFFICE O/P EST LOW 20 MIN: CPT | Mod: S$GLB,,, | Performed by: PHYSICIAN ASSISTANT

## 2022-11-22 PROCEDURE — 1160F PR REVIEW ALL MEDS BY PRESCRIBER/CLIN PHARMACIST DOCUMENTED: ICD-10-PCS | Mod: CPTII,S$GLB,, | Performed by: PHYSICIAN ASSISTANT

## 2022-11-22 PROCEDURE — 3074F PR MOST RECENT SYSTOLIC BLOOD PRESSURE < 130 MM HG: ICD-10-PCS | Mod: CPTII,S$GLB,, | Performed by: PHYSICIAN ASSISTANT

## 2022-11-22 PROCEDURE — 87502 INFLUENZA DNA AMP PROBE: CPT | Mod: QW,S$GLB,, | Performed by: PHYSICIAN ASSISTANT

## 2022-11-22 PROCEDURE — 3074F SYST BP LT 130 MM HG: CPT | Mod: CPTII,S$GLB,, | Performed by: PHYSICIAN ASSISTANT

## 2022-11-22 PROCEDURE — 3078F PR MOST RECENT DIASTOLIC BLOOD PRESSURE < 80 MM HG: ICD-10-PCS | Mod: CPTII,S$GLB,, | Performed by: PHYSICIAN ASSISTANT

## 2022-11-22 PROCEDURE — 87502 POCT INFLUENZA A/B MOLECULAR: ICD-10-PCS | Mod: QW,S$GLB,, | Performed by: PHYSICIAN ASSISTANT

## 2022-11-22 PROCEDURE — 1159F MED LIST DOCD IN RCRD: CPT | Mod: CPTII,S$GLB,, | Performed by: PHYSICIAN ASSISTANT

## 2022-11-22 PROCEDURE — 99213 PR OFFICE/OUTPT VISIT, EST, LEVL III, 20-29 MIN: ICD-10-PCS | Mod: S$GLB,,, | Performed by: PHYSICIAN ASSISTANT

## 2022-11-22 PROCEDURE — 87635 SARS-COV-2 COVID-19 AMP PRB: CPT | Mod: QW,S$GLB,, | Performed by: PHYSICIAN ASSISTANT

## 2022-11-22 NOTE — PROGRESS NOTES
"Subjective:       Patient ID: Terri Tavera is a 32 y.o. female.    Vitals:  height is 5' 3" (1.6 m) and weight is 87.5 kg (193 lb). Her oral temperature is 99.4 °F (37.4 °C). Her blood pressure is 107/66 and her pulse is 59 (abnormal). Her respiration is 24 (abnormal) and oxygen saturation is 100%.     Chief Complaint: Cough    Patient is a 32 year old pregnant female at 29w5d gestation with twins who presents for evaluation of sinus congestion, low grade fever, cough, and body aches. Patient states she reached out to OBGYN and was encouraged to be tested for COVID, FLU, and Strep. Patient has been taking tylenol and robitussin with some relief of symptoms. Patient denies any nausea, vomiting or diarrhea. She has been tolerating fluid and food well.     Cough  This is a new problem. The current episode started in the past 7 days (Sunday). The problem has been gradually worsening. The problem occurs every few minutes. The cough is Productive of sputum. Associated symptoms include chills, a fever, myalgias, nasal congestion, postnasal drip, rhinorrhea, a sore throat and shortness of breath. Pertinent negatives include no chest pain, ear congestion, ear pain, headaches, heartburn, hemoptysis, rash, sweats, weight loss or wheezing. Associated symptoms comments: dizzyness. Nothing aggravates the symptoms. Risk factors for lung disease include animal exposure and occupational exposure. She has tried OTC cough suppressant (Tylenol, Flonase) for the symptoms. The treatment provided mild relief. Her past medical history is significant for environmental allergies. There is no history of asthma, bronchiectasis, bronchitis, COPD, emphysema or pneumonia.     Constitution: Positive for chills and fever.   HENT:  Positive for congestion, postnasal drip and sore throat. Negative for ear pain, sinus pain, sinus pressure and trouble swallowing.    Neck: Negative for painful lymph nodes.   Cardiovascular:  Negative for chest pain. "   Respiratory:  Positive for cough and shortness of breath. Negative for sputum production, bloody sputum and wheezing.    Gastrointestinal:  Negative for abdominal pain, nausea, vomiting, diarrhea and heartburn.   Musculoskeletal:  Positive for muscle ache.   Skin:  Negative for rash.   Allergic/Immunologic: Positive for environmental allergies.   Neurological:  Negative for headaches.   Hematologic/Lymphatic: Negative for swollen lymph nodes.     Objective:      Physical Exam   Constitutional: She is oriented to person, place, and time. She appears well-developed. She is cooperative.  Non-toxic appearance. She does not appear ill. No distress.   HENT:   Head: Normocephalic and atraumatic.   Ears:   Right Ear: Hearing, tympanic membrane, external ear and ear canal normal.   Left Ear: Hearing, tympanic membrane, external ear and ear canal normal.   Nose: Congestion present. No mucosal edema, rhinorrhea or nasal deformity. No epistaxis. Right sinus exhibits no maxillary sinus tenderness and no frontal sinus tenderness. Left sinus exhibits no maxillary sinus tenderness and no frontal sinus tenderness.   Mouth/Throat: Uvula is midline and mucous membranes are normal. No trismus in the jaw. Normal dentition. No uvula swelling. Posterior oropharyngeal erythema present. No oropharyngeal exudate or posterior oropharyngeal edema.   Eyes: Conjunctivae and lids are normal. No scleral icterus.   Neck: Trachea normal and phonation normal. Neck supple. No edema present. No erythema present. No neck rigidity present.   Cardiovascular: Normal rate, regular rhythm, normal heart sounds and normal pulses.   No murmur heard.  Pulmonary/Chest: Effort normal and breath sounds normal. No respiratory distress. She has no decreased breath sounds. She has no wheezes. She has no rhonchi.   Abdominal: Normal appearance.   Musculoskeletal: Normal range of motion.         General: No deformity. Normal range of motion.   Lymphadenopathy:     She  has no cervical adenopathy.   Neurological: She is alert and oriented to person, place, and time. She exhibits normal muscle tone. Coordination normal.   Skin: Skin is warm, dry, intact, not diaphoretic and not pale.   Psychiatric: Her speech is normal and behavior is normal. Judgment and thought content normal.   Nursing note and vitals reviewed.      Results for orders placed or performed in visit on 11/22/22   POCT Influenza A/B MOLECULAR   Result Value Ref Range    POC Molecular Influenza A Ag Negative Negative, Not Reported    POC Molecular Influenza B Ag Negative Negative, Not Reported     Acceptable Yes    POCT Strep A, Molecular   Result Value Ref Range    Molecular Strep A, POC Negative Negative     Acceptable Yes    POCT COVID-19 Rapid Screening   Result Value Ref Range    POC Rapid COVID Positive (A) Negative     Acceptable Yes        Assessment:       1. COVID-19 affecting pregnancy in second trimester          Plan:         COVID-19 affecting pregnancy in second trimester  -     POCT Influenza A/B MOLECULAR  -     POCT Strep A, Molecular  -     POCT COVID-19 Rapid Screening       Discussed results with patient and proper quarantine based on CDC guidelines.   Discussed use of OTC medications for symptom control as this is a viral disease.   Patient encouraged to call OB for recommendations on antivirals.   All ER precautions covered including but not limited to shortness of breath, intractable fever, or chest pain.  Discussed RTC if symptoms worsen, change, or persist.     Patient verbalized understanding and agreed with the plan.     Terri Villaseñor PA-C    Patient Instructions   CDC COVID QUARANTINE     Quarantine  Quarantine if you have been in close contact (within 6 feet of someone for a cumulative total of 15 minutes or more over a 24-hour period) with someone who has COVID-19, unless you have been fully vaccinated or had a positive test within 90 days  and are no longer symptomatic.  People who are fully vaccinated and/or had a positive test within 90 days do NOT need to quarantine after contact with someone who had COVID-19 unless they have symptoms. However, fully vaccinated people who have not had a positive test within 90 days, should get tested 3-5 days after their exposure, even if they don't have symptoms and wear a mask indoors in public for 14 days following exposure or until their test result is negative.    If you have not been vaccinated, and don't have a positive test within 90 days, your quarantine is 10 days without a test, or you can choose to test out of quarantine.   After 5-7 days without symptoms, you can test at that point and you can come out of quarantine on day 8 if negative and still without symptoms.   The risk is that if you test without symptoms on Day 6 (for example) and you are positive, your 10 day quarantine begins on that day, and you turned your 7 day quarantine into 16 days.    PLEASE READ YOUR DISCHARGE INSTRUCTIONS ENTIRELY AS IT CONTAINS IMPORTANT INFORMATION.      Please drink plenty of fluids.    Please get plenty of rest.    Please return here or go to the Emergency Department for any concerns or worsening of condition.    Please take an over the counter antihistamine medication (allegra/Claritin/Zyrtec) of your choice as directed.    Try an over the counter decongestant like Mucinex D or Sudafed. You buy this behind the pharmacy counter    If you do have Hypertension or palpitations, it is safe to take Coricidin HBP for relief of sinus symptoms.    If not allergic, please take over the counter Tylenol (Acetaminophen) and/or Motrin (Ibuprofen) as directed for control of pain and/or fever.  Please follow up with your primary care doctor or specialist as needed.    Sore throat recommendations: Warm fluids, warm salt water gargles, throat lozenges, tea, honey, soup, rest, hydration.    Use over the counter flonase: one spray  each nostril twice daily OR two sprays each nostril once daily.     Sinus rinses DO NOT USE TAP WATER, if you must, water must be a rolling boil for 1 minute, let it cool, then use.  May use distilled water, or over the counter nasal saline rinses.  Vics vapor rub in shower to help open nasal passages.  May use nasal gel to keep passages moisturized.  May use Nasal saline sprays during the day for added relief of congestion.   For those who go to the gym, please do not use the sauna or steam room now to clear sinuses.    If you  smoke, please stop smoking.    Please return or see your primary care doctor if you develop new or worsening symptoms.     Please arrange follow up with your primary medical clinic as soon as possible. You must understand that you've received an Urgent Care treatment only and that you may be released before all of your medical problems are known or treated. You, the patient, will arrange for follow up as instructed. If your symptoms worsen or fail to improve you should go to the Emergency Room.

## 2022-11-22 NOTE — PATIENT INSTRUCTIONS
[facility] COVID QUARANTINE     Quarantine  Quarantine if you have been in close contact (within 6 feet of someone for a cumulative total of 15 minutes or more over a 24-hour period) with someone who has COVID-19, unless you have been fully vaccinated or had a positive test within 90 days and are no longer symptomatic.  People who are fully vaccinated and/or had a positive test within 90 days do NOT need to quarantine after contact with someone who had COVID-19 unless they have symptoms. However, fully vaccinated people who have not had a positive test within 90 days, should get tested 3-5 days after their exposure, even if they don't have symptoms and wear a mask indoors in public for 14 days following exposure or until their test result is negative.    If you have not been vaccinated, and don't have a positive test within 90 days, your quarantine is 10 days without a test, or you can choose to test out of quarantine.   After 5-7 days without symptoms, you can test at that point and you can come out of quarantine on day 8 if negative and still without symptoms.   The risk is that if you test without symptoms on Day 6 (for example) and you are positive, your 10 day quarantine begins on that day, and you turned your 7 day quarantine into 16 days.    PLEASE READ YOUR DISCHARGE INSTRUCTIONS ENTIRELY AS IT CONTAINS IMPORTANT INFORMATION.      Please drink plenty of fluids.    Please get plenty of rest.    Please return here or go to the Emergency Department for any concerns or worsening of condition.    Please take an over the counter antihistamine medication (allegra/Claritin/Zyrtec) of your choice as directed.    Try an over the counter decongestant like Mucinex D or Sudafed. You buy this behind the pharmacy counter    If you do have Hypertension or palpitations, it is safe to take Coricidin HBP for relief of sinus symptoms.    If not allergic, please take over the counter Tylenol (Acetaminophen) and/or Motrin (Ibuprofen) as  directed for control of pain and/or fever.  Please follow up with your primary care doctor or specialist as needed.    Sore throat recommendations: Warm fluids, warm salt water gargles, throat lozenges, tea, honey, soup, rest, hydration.    Use over the counter flonase: one spray each nostril twice daily OR two sprays each nostril once daily.     Sinus rinses DO NOT USE TAP WATER, if you must, water must be a rolling boil for 1 minute, let it cool, then use.  May use distilled water, or over the counter nasal saline rinses.  Vics vapor rub in shower to help open nasal passages.  May use nasal gel to keep passages moisturized.  May use Nasal saline sprays during the day for added relief of congestion.   For those who go to the gym, please do not use the sauna or steam room now to clear sinuses.    If you  smoke, please stop smoking.    Please return or see your primary care doctor if you develop new or worsening symptoms.     Please arrange follow up with your primary medical clinic as soon as possible. You must understand that you've received an Urgent Care treatment only and that you may be released before all of your medical problems are known or treated. You, the patient, will arrange for follow up as instructed. If your symptoms worsen or fail to improve you should go to the Emergency Room.

## 2022-12-05 ENCOUNTER — CLINICAL SUPPORT (OUTPATIENT)
Dept: DIABETES | Facility: CLINIC | Age: 32
End: 2022-12-05
Payer: COMMERCIAL

## 2022-12-05 VITALS — BODY MASS INDEX: 35.15 KG/M2 | WEIGHT: 198.44 LBS

## 2022-12-05 DIAGNOSIS — O24.419 GESTATIONAL DIABETES MELLITUS (GDM) AFFECTING FIRST PREGNANCY: ICD-10-CM

## 2022-12-05 PROCEDURE — 99999 PR PBB SHADOW E&M-EST. PATIENT-LVL III: ICD-10-PCS | Mod: PBBFAC,,, | Performed by: DIETITIAN, REGISTERED

## 2022-12-05 PROCEDURE — G0108 DIAB MANAGE TRN  PER INDIV: HCPCS | Mod: S$GLB,,, | Performed by: DIETITIAN, REGISTERED

## 2022-12-05 PROCEDURE — 99999 PR PBB SHADOW E&M-EST. PATIENT-LVL III: CPT | Mod: PBBFAC,,, | Performed by: DIETITIAN, REGISTERED

## 2022-12-05 PROCEDURE — G0108 PR DIAB MANAGE TRN  PER INDIV: ICD-10-PCS | Mod: S$GLB,,, | Performed by: DIETITIAN, REGISTERED

## 2022-12-05 RX ORDER — METFORMIN HYDROCHLORIDE 500 MG/1
500 TABLET ORAL EVERY MORNING
COMMUNITY
Start: 2022-11-23 | End: 2023-02-27

## 2022-12-05 NOTE — PROGRESS NOTES
Diabetes Care Specialist Progress Note  Author: Mandi Hughes RD, CDE  Date: 12/5/2022    Program Intake  Reason for Diabetes Program Visit:: Initial Diabetes Assessment  Type of Intervention:: Individual  Education: Self-Management Skill Review  Current diabetes risk level:: high (30wks gestation, having twin boys)  In the last 12 months, have you:: used emergency room services  Was the ER or hospital admission related to diabetes?: No    No results found for: LABA1C, HGBA1C    Clinical    Problem Review  Active comorbidities affecting diabetes self-care.:  (ADD, GERD, IBS)    Clinical Assessment  Current Diabetes Treatment: Diet, Exercise, Oral Medication (metformin 500mg 1tab daily)  Have you ever experienced hypoglycemia (low blood sugar)?: no  Have you ever experienced hyperglycemia (high blood sugar)?: no    Medication Information  How many days a week do you miss your medications?: 3 or more (pt reports holding metformin past couple weeks due having COVID)  Do you sometimes have difficulty refilling your medications?: No  Medication adherence impacting ability to self-manage diabetes?: No    Labs  Do you have regular lab work to monitor your medications?: Yes  Type of Regular Lab Work:  (Glucose 1 HR PPS <135 mg/dL 218 Abnormal)  Where do you get your labs drawn?: Other  Lab Compliance Barriers: No    Nutritional Status  Diet: Diabetic diet (Pt reports 3 meals, 1snack daily.)  Meal Plan 24 Hour Recall - Breakfast: red sug oatmeal OR 1/2 whole wheat bagel, yogurt - 8oz 2% Fairlife milk or Coffee/creamer  Meal Plan 24 Hour Recall - Lunch: PB/J on wheat w/ fruit or greek yogurt  Meal Plan 24 Hour Recall - Dinner: chix stew, brown rice, broccoli  Meal Plan 24 Hour Recall - Snack: water, diet monico  Change in appetite?: No  Recent Changes in Weight: Weight Gain (~38lbs during gestation)  Current nutritional status an area of need that is impacting patient's ability to self-manage diabetes?: No    Additional  Social History    Support  Does anyone support you with your diabetes care?: no  Who takes you to your medical appointments?: self  Is Support an area impacting ability to self-manage diabetes?: No    Access to Mass Media & Technology  Does the patient have access to any of the following devices or technologies?: Smart phone, Internet Access  Media or technology needs impacting ability to self-manage diabetes?: No    Cognitive/Behavioral Health  Alert and Oriented: Yes  Difficulty Thinking: No  Requires Prompting: No  Requires assistance for routine expression?: No  Cognitive or behavioral barriers impacting ability to self-manage diabetes?: No    Culture/Roman Catholic  Culture or Voodoo beliefs that may impact ability to access healthcare: No    Communication  Language preference: English  Hearing Problems: No  Vision Problems: No  Communication needs impacting ability to self-manage diabetes?: No    Health Literacy  Preferred Learning Method: Face to Face, Reading Materials  How often do you need to have someone help you read instructions, pamphlets, or written material from your doctor or pharmacy?: Never  Health literacy needs impacting ability to self-manage diabetes?: No      Diabetes Self-Management Skills Assessment    Diabetes Disease Process/Treatment Options  Patient/caregiver able to state what happens when someone has diabetes.: yes  Patient/caregiver knows what type of diabetes they have.: yes  Diabetes Type : Gestational  Diabetes Disease Process/Treatment Options: Skills Assessment Completed: Yes  Assessment indicates:: Adequate understanding  Area of need?: No    Nutrition/Healthy Eating  Challenges to healthy eating::  (none)  Method of carbohydrate measurement:: eyeballing/guessing (avoiding sugary monico/snacks)  Patient can identify foods that impact blood sugar.: yes  Patient-identified foods:: soda, starchy vegetables (corn, peas, beans), starches (bread, pasta, rice, cereal),  sweets  Nutrition/Healthy Eating Skills Assessment Completed:: Yes  Assessment indicates:: Instruction Needed  Area of need?: Yes    Physical Activity/Exercise  Patient's daily activity level:: lightly active (pt reports being on modified bed rest program per OB team)  Patient can identify forms of physical activity.: yes  Stated forms of physical activity:: any movement performed by muscles that uses energy  Patient can identify reasons why exercise/physical activity is important in diabetes management.: no  Physical Activity/Exercise Skills Assessment Completed: : Yes  Assessment indicates:: Instruction Needed  Area of need?: Yes    Medications  Patient is able to describe current diabetes management routine.: yes  Diabetes management routine:: diet, exercise, oral medications  Patient is able to identify current diabetes medications, dosages, and appropriate timing of medications.: yes  Patient understands the purpose of the medications taken for diabetes.: no  Patient reports problems or concerns with current medication regimen.: yes  Medication regimen problems/concerns:: lack of training  Medication Skills Assessment Completed:: Yes  Assessment indicates:: Instruction Needed  Area of need?: Yes    Home Blood Glucose Monitoring  Patient states that blood sugar is checked at home daily.: yes  Monitoring Method:: home glucometer  How often do you check your blood sugar?: Twice a day  When you check what is your typical blood sugar range? : per records, fst BG 67-96, 2hr pp , 131, 142  Blood glucose logs:: yes, encouraged to bring logs to provider visits  Blood glucose logs reviewed today?: yes  Home Blood Glucose Monitoring Skills Assessment Completed: : Yes  Assessment indicates:: Adequate understanding  Area of need?: Yes    Acute Complications  Patient is able to identify types of acute complications: No  Acute Complications Skills Assessment Completed: : Yes  Assessment indicates:: Instruction  Needed  Area of need?: Yes    Chronic Complications  Chronic Complications Skills Assessment Completed: : No  Deferred due to:: Time    Psychosocial/Coping  Psychosocial/Coping Skills Assessment Completed: : No  Deffered due to:: Time    Assessment Summary and Plan  Based on today's diabetes care assessment, the following areas of need were identified:      Social 12/5/2022   Support No   Access to Mass Media/Tech No   Cognitive/Behavioral Health No   Culture/Zoroastrian No   Communication No   Health Literacy No        Clinical 12/5/2022   Medication Adherence No   Lab Compliance No   Nutritional Status No        Diabetes Self-Management Skills 12/5/2022   Diabetes Disease Process/Treatment Options No   Nutrition/Healthy Eating Yes - see care plan   Physical Activity/Exercise Yes  Discussed physical activity benefits and to limit per OB provider recommendations.   Medication Yes  Provided review of current diabetes medication action, dosage, frequency, side effects, and storage guidelines. Discussed guidelines for preventing, detecting and treating hypoglycemia and hyperglycemia. Reviewed the importance of meal and medication timing with diabetes mediations for prevention of acute complications and maximum drug benefit.   Home Blood Glucose Monitoring Yes - see care plan   Acute Complications Yes  Discussed prevention, identification signs/symptoms and treatment of hyperglycemia and hypoglycemia and when to contact clinic.          Today's interventions were provided through individual discussion, instruction, and written materials were provided.    Patient verbalized understanding of instruction and written materials.  Pt was able to return back demonstration of instructions today. Patient understood key points, needs reinforcement and further instruction.     Diabetes Self-Management Care Plan:  Today's Diabetes Self-Management Care Plan was developed with Terri's input. Terri has agreed to work toward the  following goal(s) to improve his/her overall diabetes control.      Care Plan: Diabetes Management   Updates made since 11/5/2022 12:00 AM        Problem: Healthy Eating         Goal: Eat 3 meals daily - manage carb 45-60grams/meal, 5-15grams/snack    Start Date: 12/5/2022   Expected End Date: 12/28/2022   Priority: Medium   Barriers: No Barriers Identified        Task: Reviewed the sources and role of Carbohydrate, Protein, and Fat and how each nutrient impacts blood sugar. Completed 12/5/2022        Task: Provided visual examples using dry measuring cups, food models, and other familiar objects such as computer mouse, deck or cards, tennis ball etc. to help with visualization of portions. Completed 12/5/2022        Task: Review the importance of balancing carbohydrates with each meal using portion control techniques to count servings of carbohydrate and label reading to identify serving size and amount of total carbs per serving. Completed 12/5/2022        Task: Provided Sample plate method and reviewed the use of the plate to estimate amounts of carbohydrate per meal. Completed 12/5/2022        Problem: Blood Glucose Self-Monitoring         Goal: Patient agrees to check and record blood sugars 4 times per day (fst, 2hr pp) & bring meter/records clinic.    Start Date: 12/5/2022   Expected End Date: 12/28/2022   Priority: High   Barriers: No Barriers Identified        Task: Reviewed the importance of self-monitoring blood glucose and keeping logs. Completed 12/5/2022        Task: Provided patient with blood glucose logs, reviewed appropriate timing and frequency to SMBG, education on parameters on when to notify provider and advised patient to bring logs to all appts with PCP/Endocrinologist/Diabetes Care Specialist. Completed 12/5/2022          Follow Up Plan   Follow up in about 3 weeks (around 12/26/2022).  -review BG logs  -eval goal progress  -admin diabetes distress scale    Today's care plan and follow up  schedule was discussed with patient.  Terri verbalized understanding of the care plan, goals, and agrees to follow up plan.        The patient was encouraged to communicate with his/her health care provider/physician and care team regarding his/her condition(s) and treatment.  I provided the patient with my contact information today and encouraged to contact me via phone or [facility] Patient Portal as needed.     Length of Visit   Total Time: 60 Minutes

## 2022-12-18 ENCOUNTER — WALK IN (OUTPATIENT)
Dept: URGENT CARE | Age: 32
End: 2022-12-18

## 2022-12-18 VITALS
RESPIRATION RATE: 16 BRPM | WEIGHT: 168 LBS | OXYGEN SATURATION: 97 % | TEMPERATURE: 98.1 F | DIASTOLIC BLOOD PRESSURE: 95 MMHG | BODY MASS INDEX: 30.73 KG/M2 | SYSTOLIC BLOOD PRESSURE: 143 MMHG | HEART RATE: 100 BPM

## 2022-12-18 DIAGNOSIS — S91.352A DOG BITE OF LEFT FOOT, INITIAL ENCOUNTER: Primary | ICD-10-CM
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
## 2022-12-18 PROCEDURE — 99213 OFFICE O/P EST LOW 20 MIN: CPT | Performed by: FAMILY MEDICINE

## 2022-12-18 RX ORDER — AMOXICILLIN AND CLAVULANATE POTASSIUM 875; 125 MG/1; MG/1
1 TABLET, FILM COATED ORAL 2 TIMES DAILY
Qty: 20 TABLET | Refills: 0 | Status: SHIPPED | OUTPATIENT
Start: 2022-12-18 | End: 2022-12-28

## 2022-12-18 RX ORDER — HYDROCODONE BITARTRATE AND ACETAMINOPHEN 5; 325 MG/1; MG/1
1 TABLET ORAL EVERY 6 HOURS PRN
Qty: 28 TABLET | Refills: 0 | Status: SHIPPED | OUTPATIENT
Start: 2022-12-18 | End: 2022-12-25

## 2022-12-18 ASSESSMENT — ENCOUNTER SYMPTOMS
FEVER: 0
NUMBNESS: 1

## 2022-12-29 ENCOUNTER — CLINICAL SUPPORT (OUTPATIENT)
Dept: DIABETES | Facility: CLINIC | Age: 32
End: 2022-12-29
Payer: COMMERCIAL

## 2022-12-29 DIAGNOSIS — O24.419 GESTATIONAL DIABETES MELLITUS (GDM) AFFECTING FIRST PREGNANCY: Primary | ICD-10-CM

## 2022-12-29 PROCEDURE — G0108 DIAB MANAGE TRN  PER INDIV: HCPCS | Mod: 95,,, | Performed by: DIETITIAN, REGISTERED

## 2022-12-29 PROCEDURE — G0108 PR DIAB MANAGE TRN  PER INDIV: ICD-10-PCS | Mod: 95,,, | Performed by: DIETITIAN, REGISTERED

## 2022-12-29 NOTE — PROGRESS NOTES
Diabetes Care Specialist Virtual Visit Note   It was in the patient's best interest to receive diabetes self-management education and support services in this format to prevent the exposure to COVID-19.        The patient location is: home in [place]  The chief complaint leading to consultation is: Diabetes  Visit type: audiovisual  Total time spent with patient: 60 min   Each patient to whom he or she provides medical services by telemedicine is:  (1) informed of the relationship between the physician and patient and the respective role of any other health care provider with respect to management of the patient; and (2) notified that he or she may decline to receive medical services by telemedicine and may withdraw from such care at any time.      Diabetes Care Specialist Progress Note  Author: Mandi Hughes RD, CDE  Date: 12/29/2022    Program Intake  Reason for Diabetes Program Visit:: Post Program Follow-Up  Type of Intervention:: Individual  Education: Self-Management Skill Review  Current diabetes risk level:: high (34wks gestation, having twin boys)  In the last 12 months, have you:: been admitted to a hospital (preeclampsia - pt reports dc this am w/ steroid injection included in tx)  Was the ER or hospital admission related to diabetes?: No    No results found for: LABA1C, HGBA1C    Clinical    Problem Review  Active comorbidities affecting diabetes self-care.:  (ADD, GERD, IBS)    Clinical Assessment  Current Diabetes Treatment: Diet, Oral Medication, Exercise, Insulin --Pt reports starting Lantus daily in hospital past couple days due steroid tx and waiting on Rx for home pick-up. She is unsure of dose & Rx detail not avail in care everywhere. She was told to hold metformin 500mg 1tab daily and will have OB fu next week.  Have you ever experienced hypoglycemia (low blood sugar)?: no  Have you ever experienced hyperglycemia (high blood sugar)?: no    Nutritional Status  Diet:  (Pt reports 3 meals,  1snack daily. No excess carb, sat fat, sodium. Adequate oral intake and using MR shake to assist with appetite, early satiety.)  Meal Plan 24 Hour Recall - Breakfast: 1/2 wheat bagel, cream cheese and greek yogurt OR red sug oatmeal OR grits 1/3c, egg, huizar OR bagel w/ sf jelly, 1turkey sausage link - coffee, tea (1tbsp coffeemate)  Meal Plan 24 Hour Recall - Lunch: today ate ~75% grilled cheese/ham sandwich, fruit, salad, unsweetened tea OR chix burger, broccoli, wheat pasta ~3/4c  Meal Plan 24 Hour Recall - Dinner: Ambrose meal -cheese sandwich w/ few bites seafood pasta OR ham, 3/4c potatoes, green beans OR premier protein shake, apple/pb or bagel ~4x/wk  Meal Plan 24 Hour Recall - Snack: lydia crackers or 5 triskets, cheese - occs during holidays ice cream or few pc ta; monico:water, zero gatorade, sf flv water, diet 7up  Change in appetite?: Yes (occs early satiety)  Recent Changes in Weight:  (stable, pt reports twin's growth on track per OB)  Current nutritional status an area of need that is impacting patient's ability to self-manage diabetes?: No    Diabetes Self-Management Skills Assessment    Nutrition/Healthy Eating  Challenges to healthy eating::  (none)  Method of carbohydrate measurement::  (avoiding sugary monico/snacks, visual estimation)  Patient can identify foods that impact blood sugar.: yes  Patient-identified foods:: soda, starchy vegetables (corn, peas, beans), starches (bread, pasta, rice, cereal), sweets, fruit/fruit juice, milk, yogurt  Nutrition/Healthy Eating Skills Assessment Completed:: Yes  Assessment indicates:: Adequate understanding  Area of need?: No    Physical Activity/Exercise  Patient's daily activity level::  (pt reports being on modified bed rest program per OB team)  Patient can identify forms of physical activity.: yes  Stated forms of physical activity:: any movement performed by muscles that uses energy, 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 Pt plans to resume and has double seat outdoor stroller.)  Patient can identify reasons why exercise/physical activity is important in diabetes management.: yes  Identified reasons:: helps insulin work better, lowers blood glucose, blood pressure, and cholesterol  Physical Activity/Exercise Skills Assessment Completed: : Yes  Assessment indicates:: Adequate understanding  Area of need?: No    Medications  Patient is able to describe current diabetes management routine.: yes  Diabetes management routine:: diet, exercise, oral medications, insulin  Patient is able to identify current diabetes medications, dosages, and appropriate timing of medications.: no (pending Lantus Rx pickup from pharm)  Patient understands the purpose of the medications taken for diabetes.: yes  Patient reports problems or concerns with current medication regimen.: no (Pt reports using insulin for another illness and understands injection technique.)  Medication Skills Assessment Completed:: Yes  Assessment indicates:: Adequate understanding  Area of need?: No    Home Blood Glucose Monitoring  Patient states that blood sugar is checked at home daily.: yes  Monitoring Method:: home glucometer  How often do you check your blood sugar?: Occasionally  When you check what is your typical blood sugar range? : per review home records, fst BG 64-92, occs 2hr pp readings w/ range 103-134.  Blood glucose logs:: yes, encouraged to bring logs to provider visits  Home Blood Glucose Monitoring Skills Assessment Completed: : Yes  Assessment indicates:: Adequate understanding  Area of need?: Yes    Assessment Summary and Plan  Based on today's diabetes care assessment, the following areas of need were identified:      Social 12/5/2022   Support No   Access to Mass Media/Tech No   Cognitive/Behavioral Health No   Culture/Jainism No   Communication No   Health Literacy No      Clinical 12/29/2022    Nutritional Status No      Diabetes Self-Management Skills 12/29/2022   Nutrition/Healthy Eating No   Physical Activity/Exercise No   Medication No   Home Blood Glucose Monitoring Yes - see care plan      Today's interventions were provided through individual discussion, instruction, and written materials were provided.    Patient verbalized understanding of instruction and written materials.  Pt was able to return back demonstration of instructions today. Patient understood key points, needs reinforcement and further instruction.     Diabetes Self-Management Care Plan:  Today's Diabetes Self-Management Care Plan was developed with Terri's input. Terri has agreed to work toward the following goal(s) to improve his/her overall diabetes control.      Care Plan: Diabetes Management   Updates made since 11/29/2022 12:00 AM        Problem: Healthy Eating         Goal: Eat 3 meals daily - manage carb 45-60grams/meal, 5-15grams/snack Completed 12/29/2022   Start Date: 12/5/2022   Expected End Date: 12/29/2022   This Visit's Progress: Met   Priority: Medium   Barriers: No Barriers Identified   Note:    Encouraged overall progress and sustaining for long-term T2DM prevention. Pt reports having tools to maintain. Encouraged pt to stay in touch via [employer] as needed.        Task: Reviewed the sources and role of Carbohydrate, Protein, and Fat and how each nutrient impacts blood sugar. Completed 12/5/2022        Task: Provided visual examples using dry measuring cups, food models, and other familiar objects such as computer mouse, deck or cards, tennis ball etc. to help with visualization of portions. Completed 12/5/2022        Task: Review the importance of balancing carbohydrates with each meal using portion control techniques to count servings of carbohydrate and label reading to identify serving size and amount of total carbs per serving. Completed 12/5/2022        Task: Provided Sample plate method and reviewed the use  of the plate to estimate amounts of carbohydrate per meal. Completed 12/5/2022        Problem: Blood Glucose Self-Monitoring         Goal: Patient agrees to check and record blood sugars 4 times per day (fst, 2hr pp) & bring meter/records clinic. Completed 12/29/2022   Start Date: 12/5/2022   Expected End Date: 12/28/2022   This Visit's Progress: Met   Priority: High   Barriers: No Barriers Identified   Note:    Pt meeting goals to her best ability. Encouraged consistency of testing during gestation and ~4wks after delivery or per OB instruction. Discussed increased risk T2DM and prevention strategies; encouraged pt to fu w/ pcp for annual diabetes screening. Pt verbalized understanding.       Task: Reviewed the importance of self-monitoring blood glucose and keeping logs. Completed 12/5/2022        Task: Provided patient with blood glucose logs, reviewed appropriate timing and frequency to SMBG, education on parameters on when to notify provider and advised patient to bring logs to all appts with PCP/Endocrinologist/Diabetes Care Specialist. Completed 12/5/2022          Follow Up Plan   Follow up as referred.    Today's care plan and follow up schedule was discussed with patient.  Terri verbalized understanding of the care plan, goals, and agrees to follow up plan.        The patient was encouraged to communicate with his/her health care provider/physician and care team regarding his/her condition(s) and treatment.  I provided the patient with my contact information today and encouraged to contact me via phone or [facility]'s Patient Portal as needed.     Length of Visit   Total Time: 60 Minutes

## 2022-12-29 NOTE — LETTER
December 29, 2022      Janice Sow MD  [address]  [address]  [facility] [address]         Patient: Terri Tavera   MR Number: [id number]   YOB: 1990   Date of Visit: 12/29/2022       Dear Dr. Sow:    Thank you for referring Terri for diabetes self-management education and support services. She has completed all components of our Diabetes Management Program. Below is a summary of her clinical outcomes and goal progress.    Patient Outcomes:    A1c Status: No results found for: HGBA1C    Care Plan: Diabetes Management   Updates made since 11/29/2022 12:00 AM        Problem: Healthy Eating         Goal: Eat 3 meals daily - manage carb 45-60grams/meal, 5-15grams/snack Completed 12/29/2022   Start Date: 12/5/2022   Expected End Date: 12/29/2022   This Visit's Progress: Met   Priority: Medium   Barriers: No Barriers Identified   Note:    Encouraged overall progress and sustaining for long-term T2DM prevention. Pt reports having tools to maintain. Encouraged pt to stay in touch via [employer] as needed.        Task: Reviewed the sources and role of Carbohydrate, Protein, and Fat and how each nutrient impacts blood sugar. Completed 12/5/2022        Task: Provided visual examples using dry measuring cups, food models, and other familiar objects such as computer mouse, deck or cards, tennis ball etc. to help with visualization of portions. Completed 12/5/2022        Task: Review the importance of balancing carbohydrates with each meal using portion control techniques to count servings of carbohydrate and label reading to identify serving size and amount of total carbs per serving. Completed 12/5/2022        Task: Provided Sample plate method and reviewed the use of the plate to estimate amounts of carbohydrate per meal. Completed 12/5/2022        Problem: Blood Glucose Self-Monitoring         Goal: Patient agrees to check and record blood sugars 4 times per day (fst, 2hr pp) & bring  meter/records clinic. Completed 12/29/2022   Start Date: 12/5/2022   Expected End Date: 12/28/2022   This Visit's Progress: Met   Priority: High   Barriers: No Barriers Identified   Note:    Pt meeting goals to her best ability. Encouraged consistency of testing during gestation and ~4wks after delivery or per OB instruction. Discussed increased risk T2DM and prevention strategies; encouraged pt to fu w/ pcp for annual diabetes screening. Pt verbalized understanding.       Task: Reviewed the importance of self-monitoring blood glucose and keeping logs. Completed 12/5/2022        Task: Provided patient with blood glucose logs, reviewed appropriate timing and frequency to SMBG, education on parameters on when to notify provider and advised patient to bring logs to all appts with PCP/Endocrinologist/Diabetes Care Specialist. Completed 12/5/2022          Follow up:   · Terri to attend medical appointments as scheduled  · Terri to update you on her DM education progress as needed      If you have questions, please do not hesitate to call me. I look forward to providing additional education and support as referred.    Sincerely,    Mandi Hughes RD, CDE  Diabetes Care and

## 2023-01-02 ENCOUNTER — OFFICE VISIT (OUTPATIENT)
Dept: URGENT CARE | Facility: CLINIC | Age: 33
End: 2023-01-02
Payer: COMMERCIAL

## 2023-01-02 VITALS
DIASTOLIC BLOOD PRESSURE: 98 MMHG | HEART RATE: 113 BPM | BODY MASS INDEX: 34.73 KG/M2 | OXYGEN SATURATION: 97 % | HEIGHT: 63 IN | SYSTOLIC BLOOD PRESSURE: 135 MMHG | TEMPERATURE: 99 F | WEIGHT: 196 LBS | RESPIRATION RATE: 20 BRPM

## 2023-01-02 DIAGNOSIS — R11.0 NAUSEA: ICD-10-CM

## 2023-01-02 DIAGNOSIS — R09.81 COUGH WITH CONGESTION OF PARANASAL SINUS: ICD-10-CM

## 2023-01-02 DIAGNOSIS — Z3A.35 35 WEEKS GESTATION OF PREGNANCY: ICD-10-CM

## 2023-01-02 DIAGNOSIS — R05.8 COUGH WITH CONGESTION OF PARANASAL SINUS: ICD-10-CM

## 2023-01-02 DIAGNOSIS — R68.89 FLU-LIKE SYMPTOMS: Primary | ICD-10-CM

## 2023-01-02 LAB
CTP QC/QA: YES
POC MOLECULAR INFLUENZA A AGN: NEGATIVE
POC MOLECULAR INFLUENZA B AGN: NEGATIVE

## 2023-01-02 PROCEDURE — 1160F PR REVIEW ALL MEDS BY PRESCRIBER/CLIN PHARMACIST DOCUMENTED: ICD-10-PCS | Mod: CPTII,S$GLB,, | Performed by: PHYSICIAN ASSISTANT

## 2023-01-02 PROCEDURE — 87502 POCT INFLUENZA A/B MOLECULAR: ICD-10-PCS | Mod: QW,S$GLB,, | Performed by: PHYSICIAN ASSISTANT

## 2023-01-02 PROCEDURE — 3080F PR MOST RECENT DIASTOLIC BLOOD PRESSURE >= 90 MM HG: ICD-10-PCS | Mod: CPTII,S$GLB,, | Performed by: PHYSICIAN ASSISTANT

## 2023-01-02 PROCEDURE — 3075F PR MOST RECENT SYSTOLIC BLOOD PRESS GE 130-139MM HG: ICD-10-PCS | Mod: CPTII,S$GLB,, | Performed by: PHYSICIAN ASSISTANT

## 2023-01-02 PROCEDURE — 3075F SYST BP GE 130 - 139MM HG: CPT | Mod: CPTII,S$GLB,, | Performed by: PHYSICIAN ASSISTANT

## 2023-01-02 PROCEDURE — 99214 PR OFFICE/OUTPT VISIT, EST, LEVL IV, 30-39 MIN: ICD-10-PCS | Mod: S$GLB,,, | Performed by: PHYSICIAN ASSISTANT

## 2023-01-02 PROCEDURE — 99214 OFFICE O/P EST MOD 30 MIN: CPT | Mod: S$GLB,,, | Performed by: PHYSICIAN ASSISTANT

## 2023-01-02 PROCEDURE — 1160F RVW MEDS BY RX/DR IN RCRD: CPT | Mod: CPTII,S$GLB,, | Performed by: PHYSICIAN ASSISTANT

## 2023-01-02 PROCEDURE — 87502 INFLUENZA DNA AMP PROBE: CPT | Mod: QW,S$GLB,, | Performed by: PHYSICIAN ASSISTANT

## 2023-01-02 PROCEDURE — 3080F DIAST BP >= 90 MM HG: CPT | Mod: CPTII,S$GLB,, | Performed by: PHYSICIAN ASSISTANT

## 2023-01-02 PROCEDURE — 1159F PR MEDICATION LIST DOCUMENTED IN MEDICAL RECORD: ICD-10-PCS | Mod: CPTII,S$GLB,, | Performed by: PHYSICIAN ASSISTANT

## 2023-01-02 PROCEDURE — 1159F MED LIST DOCD IN RCRD: CPT | Mod: CPTII,S$GLB,, | Performed by: PHYSICIAN ASSISTANT

## 2023-01-02 PROCEDURE — 3008F PR BODY MASS INDEX (BMI) DOCUMENTED: ICD-10-PCS | Mod: CPTII,S$GLB,, | Performed by: PHYSICIAN ASSISTANT

## 2023-01-02 PROCEDURE — 3008F BODY MASS INDEX DOCD: CPT | Mod: CPTII,S$GLB,, | Performed by: PHYSICIAN ASSISTANT

## 2023-01-02 RX ORDER — ONDANSETRON 4 MG/1
4 TABLET, ORALLY DISINTEGRATING ORAL EVERY 12 HOURS PRN
Qty: 10 TABLET | Refills: 0 | Status: SHIPPED | OUTPATIENT
Start: 2023-01-02 | End: 2023-01-07

## 2023-01-02 NOTE — PATIENT INSTRUCTIONS
VIRAL URI: OVER THE COUNTER RECOMMENDATIONS/SUGGESTIONS--if needed    Zofran for nausea as needed    SORE THROAT:    You may gargle with hot salt water 4 times a day for the next 2 days and then you may also gargle diluted hydrogen peroxide once to twice daily to alleviate some of your throat discomfort.  Drink plenty of fluids, recommend warm tea with honey.     You can also take a daily anti-histamine such as Zyrtec or Claritin,  to help with runny nose/sneezing/sore throat/cough. Remember to switch antihistamines every 3 months, if taken daily.     Honey is a natural cough suppressant that can be used.    If your symptoms do not improve, you should return to this clinic. If your symptoms worsen, go to the emergency room.         CONGESTION:  Make sure to stay well hydrated.    Use Nasal Saline to mechanically move any post nasal drip from your eustachian tube or from the back of your throat.    You may insert a whole garlic cloves into your nostrils and leave for 10-15 minutes. When you remove them, mucus will be pulled down. This may burn as garlic is strong.  Repeat as often as needed and able to tolerate.  Please do not use garlic if you have an allergy to garlic.      PAIN/DISCOMFORT:  Tylenol up to 4,000 mg a day is safe for short periods and can be used for headache, body aches, pain, and fever. However in high doses and prolonged use it can cause liver irritation.      - You must understand that you have received an Urgent Care treatment only and that you may be released before all of your medical problems are known or treated.   - You, the patient, will arrange for follow up care as instructed with your primary care provider or recommended specialist.   - If your condition worsens or fails to improve we recommend that you receive another evaluation at the ER immediately or contact your PCP to discuss your concerns, or return here.   - Please do not drive or make any important decisions for 24 hours if you  have received any pain medications, sedatives or mood altering drugs during your visit.    Disclaimer: This document was drafted with the use of a voice recognition device and is likely to have sound alike errors.

## 2023-01-02 NOTE — PROGRESS NOTES
"Subjective:       Patient ID: Terri Tavera is a 32 y.o. female.    Vitals:  height is 5' 3" (1.6 m) and weight is 88.9 kg (196 lb). Her temperature is 98.5 °F (36.9 °C). Her blood pressure is 135/98 (abnormal) and her pulse is 113 (abnormal). Her respiration is 20 and oxygen saturation is 97%.     Chief Complaint: Fever    32 year old female presents today with a fever of tmax 101, congestion, HA, body aches, nausea, chills, sweats, dry cough, post nasal drip. Treatments at home include Tylenol with mild relief for the fever. Currently pregnant with twins. Positive COVID 11/2022. COVID vaccinated. Fever started 01/01/2023 and Congestion started on 12/30/2022.  Due to see OBGYN tomorrow.    Fever   This is a new problem. The current episode started yesterday. The problem occurs intermittently. The problem has been waxing and waning. The maximum temperature noted was 101 to 101.9 F. Associated symptoms include congestion, coughing, headaches, muscle aches, nausea, sleepiness and a sore throat. Pertinent negatives include no abdominal pain, chest pain, diarrhea, ear pain, rash, urinary pain, vomiting or wheezing. She has tried acetaminophen for the symptoms.   Risk factors: no sick contacts      Constitution: Positive for fever.   HENT:  Positive for congestion and sore throat. Negative for ear pain.    Cardiovascular:  Negative for chest pain.   Respiratory:  Positive for cough. Negative for wheezing.    Gastrointestinal:  Positive for nausea. Negative for abdominal pain, vomiting and diarrhea.   Genitourinary:  Negative for dysuria.   Skin:  Negative for rash.   Neurological:  Positive for headaches.     Objective:      Vitals:    01/02/23 1003   BP: (!) 135/98   Pulse: (!) 113   Resp: 20   Temp: 98.5 °F (36.9 °C)   SpO2: 97%   Weight: 88.9 kg (196 lb)   Height: 5' 3" (1.6 m)       Physical Exam   Constitutional: She is oriented to person, place, and time. She appears well-developed. She is cooperative.  Non-toxic " appearance. She does not appear ill. No distress.   HENT:   Head: Normocephalic and atraumatic. Head is without raccoon's eyes and without Cordova's sign.   Ears:   Right Ear: Hearing, tympanic membrane, external ear and ear canal normal. No no drainage.   Left Ear: Hearing, tympanic membrane, external ear and ear canal normal. No no drainage.   Nose: Mucosal edema, rhinorrhea and congestion present.   Mouth/Throat: Uvula is midline. Mucous membranes are dry. Posterior oropharyngeal edema present. Tonsils are 0 on the right. Tonsils are 0 on the left. No tonsillar exudate.   PND      Comments: PND  Eyes: Conjunctivae and EOM are normal. Pupils are equal, round, and reactive to light. Extraocular movement intact vision grossly intact gaze aligned appropriately   Neck: Neck supple. No Brudzinski's sign and no Kernig's sign noted. No neck rigidity present. muscular tenderness present.   Cardiovascular: Normal rate, regular rhythm, normal heart sounds and normal pulses.   Pulmonary/Chest: Effort normal and breath sounds normal. No accessory muscle usage or stridor. No respiratory distress. She has no decreased breath sounds. She has no wheezes. She has no rhonchi. She has no rales. She exhibits no tenderness.         Comments: Frequent coughing during exam    Abdominal:      Comments:    Musculoskeletal:      Right lower leg: No edema.      Left lower leg: No edema.      Comments: Malaise (generalized)   Lymphadenopathy:     She has no cervical adenopathy.   Neurological: no focal deficit. She is alert, oriented to person, place, and time and at baseline. She displays no dysarthria. No cranial nerve deficit. Gait normal.   Skin: Skin is warm, dry, not diaphoretic and no rash. Capillary refill takes less than 2 seconds.   Psychiatric: Her speech is normal and behavior is normal. Judgment and thought content normal.   Nursing note and vitals reviewed.      Assessment:       1. Flu-like symptoms    2. 35 weeks  gestation of pregnancy    3. Nausea    4. Cough with congestion of paranasal sinus        Results for orders placed or performed in visit on 01/02/23   POCT Influenza A/B MOLECULAR   Result Value Ref Range    POC Molecular Influenza A Ag Negative Negative, Not Reported    POC Molecular Influenza B Ag Negative Negative, Not Reported     Acceptable Yes        Plan:         Flu-like symptoms  -     POCT Influenza A/B MOLECULAR    35 weeks gestation of pregnancy    Nausea  -     ondansetron (ZOFRAN-ODT) 4 MG TbDL; Take 1 tablet (4 mg total) by mouth every 12 (twelve) hours as needed (nausea).  Dispense: 10 tablet; Refill: 0    Cough with congestion of paranasal sinus                 Patient Instructions   VIRAL URI: OVER THE COUNTER RECOMMENDATIONS/SUGGESTIONS--if needed    Zofran for nausea as needed    SORE THROAT:    You may gargle with hot salt water 4 times a day for the next 2 days and then you may also gargle diluted hydrogen peroxide once to twice daily to alleviate some of your throat discomfort.  Drink plenty of fluids, recommend warm tea with honey.     You can also take a daily anti-histamine such as Zyrtec or Claritin,  to help with runny nose/sneezing/sore throat/cough. Remember to switch antihistamines every 3 months, if taken daily.     Honey is a natural cough suppressant that can be used.    If your symptoms do not improve, you should return to this clinic. If your symptoms worsen, go to the emergency room.         CONGESTION:  Make sure to stay well hydrated.    Use Nasal Saline to mechanically move any post nasal drip from your eustachian tube or from the back of your throat.    You may insert a whole garlic cloves into your nostrils and leave for 10-15 minutes. When you remove them, mucus will be pulled down. This may burn as garlic is strong.  Repeat as often as needed and able to tolerate.  Please do not use garlic if you have an allergy to garlic.      PAIN/DISCOMFORT:  Tylenol up  to 4,000 mg a day is safe for short periods and can be used for headache, body aches, pain, and fever. However in high doses and prolonged use it can cause liver irritation.

## 2023-01-05 ENCOUNTER — TELEPHONE (OUTPATIENT)
Dept: URGENT CARE | Facility: CLINIC | Age: 33
End: 2023-01-05
Payer: COMMERCIAL

## 2023-04-12 ENCOUNTER — OFFICE VISIT (OUTPATIENT)
Dept: URGENT CARE | Facility: CLINIC | Age: 33
End: 2023-04-12
Payer: COMMERCIAL

## 2023-04-12 VITALS
WEIGHT: 164 LBS | RESPIRATION RATE: 20 BRPM | TEMPERATURE: 100 F | HEIGHT: 63 IN | DIASTOLIC BLOOD PRESSURE: 72 MMHG | OXYGEN SATURATION: 98 % | SYSTOLIC BLOOD PRESSURE: 118 MMHG | HEART RATE: 119 BPM | BODY MASS INDEX: 29.06 KG/M2

## 2023-04-12 DIAGNOSIS — R50.9 FEVER, UNSPECIFIED FEVER CAUSE: ICD-10-CM

## 2023-04-12 DIAGNOSIS — B34.9 VIRAL SYNDROME: Primary | ICD-10-CM

## 2023-04-12 LAB
CTP QC/QA: YES
CTP QC/QA: YES
POC MOLECULAR INFLUENZA A AGN: NEGATIVE
POC MOLECULAR INFLUENZA B AGN: NEGATIVE
SARS-COV-2 AG RESP QL IA.RAPID: NEGATIVE

## 2023-04-12 PROCEDURE — 99213 OFFICE O/P EST LOW 20 MIN: CPT | Mod: S$GLB,,, | Performed by: PHYSICIAN ASSISTANT

## 2023-04-12 PROCEDURE — 87502 POCT INFLUENZA A/B MOLECULAR: ICD-10-PCS | Mod: QW,S$GLB,, | Performed by: PHYSICIAN ASSISTANT

## 2023-04-12 PROCEDURE — 87502 INFLUENZA DNA AMP PROBE: CPT | Mod: QW,S$GLB,, | Performed by: PHYSICIAN ASSISTANT

## 2023-04-12 PROCEDURE — 87811 SARS CORONAVIRUS 2 ANTIGEN POCT, MANUAL READ: ICD-10-PCS | Mod: QW,S$GLB,, | Performed by: PHYSICIAN ASSISTANT

## 2023-04-12 PROCEDURE — 99213 PR OFFICE/OUTPT VISIT, EST, LEVL III, 20-29 MIN: ICD-10-PCS | Mod: S$GLB,,, | Performed by: PHYSICIAN ASSISTANT

## 2023-04-12 PROCEDURE — 87811 SARS-COV-2 COVID19 W/OPTIC: CPT | Mod: QW,S$GLB,, | Performed by: PHYSICIAN ASSISTANT

## 2023-04-12 RX ORDER — DULOXETIN HYDROCHLORIDE 20 MG/1
20 CAPSULE, DELAYED RELEASE ORAL DAILY
COMMUNITY

## 2023-04-12 NOTE — LETTER
April 12, 2023      [facility]  [address], [address]  [facility] [address]  Phone: [phone]       Patient: Terri Tavera   YOB: 1990  Date of Visit: 04/12/2023    To Whom It May Concern:    Apple Tavera  was at [facility] on 04/12/2023. The patient may return to work on 4/17/23 with no restrictions. If you have any questions or concerns, or if I can be of further assistance, please do not hesitate to contact me.    Sincerely,      Jodee Mcdaniel PA-C

## 2023-04-12 NOTE — PROGRESS NOTES
"Subjective:      Patient ID: Terri Tavera is a 33 y.o. female.    Vitals:  height is 5' 3" (1.6 m) and weight is 74.4 kg (164 lb). Her temperature is 99.7 °F (37.6 °C). Her blood pressure is 118/72 and her pulse is 119 (abnormal). Her respiration is 20 and oxygen saturation is 98%.     Chief Complaint: Fever    Patient states her highest temp today was 101.8. Body aches/chills starting today. Patient took tylenol around 1p today. Patient is concerned for covid or flu b/c she has 13 week old twins at home.    Fever   This is a new problem. The current episode started today. The maximum temperature noted was 101 to 101.9 F. Associated symptoms include headaches, muscle aches, sleepiness and a sore throat. Pertinent negatives include no abdominal pain, chest pain, congestion, coughing, diarrhea, ear pain or nausea. She has tried acetaminophen (advil) for the symptoms. The treatment provided mild relief.   Risk factors: no contaminated food, no contaminated water, no hx of cancer, no immunosuppression, no occupational exposure, no recent sickness, no recent travel and no sick contacts      Constitution: Positive for fever.   HENT:  Positive for sore throat. Negative for ear pain and congestion.    Cardiovascular:  Negative for chest pain.   Respiratory:  Negative for cough.    Gastrointestinal:  Negative for abdominal pain, nausea and diarrhea.   Neurological:  Positive for headaches.    Objective:     Physical Exam   Constitutional: She is oriented to person, place, and time. She appears well-developed. She is cooperative.   HENT:   Head: Normocephalic and atraumatic.   Ears:   Right Ear: Hearing normal.   Left Ear: Hearing normal.   Nose: Nose normal. No mucosal edema or nasal deformity. No epistaxis. Right sinus exhibits no maxillary sinus tenderness and no frontal sinus tenderness. Left sinus exhibits no maxillary sinus tenderness and no frontal sinus tenderness.   Mouth/Throat: Uvula is midline, oropharynx is " clear and moist and mucous membranes are normal. No trismus in the jaw. Normal dentition. No uvula swelling.   Eyes: Conjunctivae and lids are normal.   Neck: Trachea normal and phonation normal. Neck supple.   Cardiovascular: Normal rate, regular rhythm and normal heart sounds.   Pulmonary/Chest: Effort normal and breath sounds normal.   Abdominal: Normal appearance.   Musculoskeletal: Normal range of motion.         General: Normal range of motion.   Neurological: She is alert and oriented to person, place, and time. She exhibits normal muscle tone.   Skin: Skin is warm, dry and intact.   Psychiatric: Her speech is normal and behavior is normal. Judgment and thought content normal.   Nursing note and vitals reviewed.    Assessment:     1. Viral syndrome    2. Fever, unspecified fever cause      Results for orders placed or performed in visit on 04/12/23   POCT Influenza A/B MOLECULAR   Result Value Ref Range    POC Molecular Influenza A Ag Negative Negative, Not Reported    POC Molecular Influenza B Ag Negative Negative, Not Reported     Acceptable Yes    SARS Coronavirus 2 Antigen, POCT Manual Read   Result Value Ref Range    SARS Coronavirus 2 Antigen Negative Negative     Acceptable Yes          Plan:   Previous encounters and labs were independently reviewed. Discussed with patient  all pertinent information and results. Discussed patient diagnosis and plan of treatment. Patient  was given all follow up and return instructions. All questions and concerns were addressed at this time. Advised patient to follow up with PCP and go to the ED with worsening symptoms.  Patient verbalized understanding, agrees with the plan, and is comfortable with discharge.      Viral syndrome    Fever, unspecified fever cause  -     POCT Influenza A/B MOLECULAR  -     SARS Coronavirus 2 Antigen, POCT Manual Read

## 2023-04-15 ENCOUNTER — TELEPHONE (OUTPATIENT)
Dept: URGENT CARE | Facility: CLINIC | Age: 33
End: 2023-04-15
Payer: COMMERCIAL

## 2023-04-17 ENCOUNTER — OFFICE VISIT (OUTPATIENT)
Dept: OTOLARYNGOLOGY | Facility: CLINIC | Age: 33
End: 2023-04-17
Payer: COMMERCIAL

## 2023-04-17 DIAGNOSIS — H92.03 OTALGIA OF BOTH EARS: ICD-10-CM

## 2023-04-17 DIAGNOSIS — H60.8X3 CHRONIC ECZEMATOID OTITIS EXTERNA OF BOTH EARS: Primary | ICD-10-CM

## 2023-04-17 PROCEDURE — 1159F PR MEDICATION LIST DOCUMENTED IN MEDICAL RECORD: ICD-10-PCS | Mod: CPTII,S$GLB,, | Performed by: OTOLARYNGOLOGY

## 2023-04-17 PROCEDURE — 99204 PR OFFICE/OUTPT VISIT, NEW, LEVL IV, 45-59 MIN: ICD-10-PCS | Mod: S$GLB,,, | Performed by: OTOLARYNGOLOGY

## 2023-04-17 PROCEDURE — 99999 PR PBB SHADOW E&M-EST. PATIENT-LVL II: ICD-10-PCS | Mod: PBBFAC,,, | Performed by: OTOLARYNGOLOGY

## 2023-04-17 PROCEDURE — 99999 PR PBB SHADOW E&M-EST. PATIENT-LVL II: CPT | Mod: PBBFAC,,, | Performed by: OTOLARYNGOLOGY

## 2023-04-17 PROCEDURE — 1159F MED LIST DOCD IN RCRD: CPT | Mod: CPTII,S$GLB,, | Performed by: OTOLARYNGOLOGY

## 2023-04-17 PROCEDURE — 99204 OFFICE O/P NEW MOD 45 MIN: CPT | Mod: S$GLB,,, | Performed by: OTOLARYNGOLOGY

## 2023-04-17 RX ORDER — FLUOCINOLONE ACETONIDE 0.11 MG/ML
3 OIL AURICULAR (OTIC) 2 TIMES DAILY
Qty: 20 ML | Refills: 4 | Status: SHIPPED | OUTPATIENT
Start: 2023-04-17 | End: 2023-05-17

## 2023-04-17 NOTE — PROGRESS NOTES
Referring Provider:    Azra Self  No address on file  Subjective:   Patient: Terri Tavera [id number], :1990   Visit date:2023 1:22 PM    Chief Complaint:  Other (Right ear itchy )    HPI:    Prior notes reviewed by myself.  Clinical documentation obtained by nursing staff reviewed.     33-year-old female presents for evaluation of chronic itchy ears.  She started noticing itchy ears mostly on the right side while she was pregnant.  This issue has continued.  She denies any acute changes in her hearing, tinnitus, vertigo, history of otologic surgery, trauma, loud noise exposure.  She has not had any treatment for this so far.  No recent audiological testing      Objective:     Physical Exam:  Vitals:  LMP  (LMP Unknown)   General appearance:  Well developed, well nourished    Ears:  Otoscopy of external auditory canals with dry, scaly ear canal skin and tympanic membranes was normal, clinical speech reception thresholds grossly intact, no mass/lesion of auricle.    Nose:  No masses/lesions of external nose, nasal mucosa, septum, and turbinates were within normal limits.    Mouth:  No mass/lesion of lips, teeth, gums, hard/soft palate, tongue, tonsils, or oropharynx.    Neck & Lymphatics:  No cervical lymphadenopathy, no neck mass/crepitus/ asymmetry, trachea is midline, no thyroid enlargement/tenderness/mass.        [x]  Data Reviewed:    Lab Results   Component Value Date    WBC 8.02 2022    HGB 10.4 (A) 2022    HCT 33.6 (A) 2022    MCV 79.2 (A) 2022    EOSINOPHIL 1.0 2022             Assessment & Plan:   Chronic eczematoid otitis externa of both ears  -     fluocinolone acetonide oiL (DERMOTIC OIL) 0.01 % Drop; Place 3 drops in ear(s) 2 (two) times daily.  Dispense: 20 mL; Refill: 4    Otalgia of both ears        Notable absence of cerumen on exam with dry, scaly ear canal skin.  Recommended a trial of derm otic oil.  She can follow up as needed.

## 2023-06-28 ENCOUNTER — OFFICE VISIT (OUTPATIENT)
Dept: URGENT CARE | Facility: CLINIC | Age: 33
End: 2023-06-28
Payer: COMMERCIAL

## 2023-06-28 VITALS
OXYGEN SATURATION: 97 % | HEART RATE: 78 BPM | DIASTOLIC BLOOD PRESSURE: 57 MMHG | RESPIRATION RATE: 16 BRPM | SYSTOLIC BLOOD PRESSURE: 107 MMHG | TEMPERATURE: 97 F | WEIGHT: 168 LBS | BODY MASS INDEX: 29.77 KG/M2 | HEIGHT: 63 IN

## 2023-06-28 DIAGNOSIS — H10.9 BACTERIAL CONJUNCTIVITIS: Primary | ICD-10-CM

## 2023-06-28 PROCEDURE — 99213 OFFICE O/P EST LOW 20 MIN: CPT | Mod: S$GLB,,, | Performed by: NURSE PRACTITIONER

## 2023-06-28 PROCEDURE — 99213 PR OFFICE/OUTPT VISIT, EST, LEVL III, 20-29 MIN: ICD-10-PCS | Mod: S$GLB,,, | Performed by: NURSE PRACTITIONER

## 2023-06-28 RX ORDER — OFLOXACIN 3 MG/ML
2 SOLUTION/ DROPS OPHTHALMIC 4 TIMES DAILY
Qty: 10 ML | Refills: 0 | Status: SHIPPED | OUTPATIENT
Start: 2023-06-28 | End: 2023-09-25

## 2023-06-28 NOTE — PATIENT INSTRUCTIONS

## 2023-06-28 NOTE — PROGRESS NOTES
"Subjective:      Patient ID: Terri Tavera is a 33 y.o. female.    Vitals:  height is 5' 3" (1.6 m) and weight is 76.2 kg (168 lb). Her tympanic temperature is 97.4 °F (36.3 °C). Her blood pressure is 107/57 (abnormal) and her pulse is 78. Her respiration is 16 and oxygen saturation is 97%.     Chief Complaint: Eye Pain and Conjunctivitis    33 yr old female presents to the Urgent Care with complaint of right eye redness and discharge upon awakening this morning. Patient concerned for possible pink eye. Patient does not wear contacts or glasses.     Eye Pain   The right eye is affected. This is a new problem. The current episode started today. There was no injury mechanism. The pain is at a severity of 5/10. There is No known exposure to pink eye. She Does not wear contacts. Associated symptoms include an eye discharge and eye redness. Pertinent negatives include no blurred vision, double vision, fever, foreign body sensation, itching, nausea, photophobia, recent URI or vomiting. She has tried nothing for the symptoms.     Constitution: Negative for fever.   Eyes:  Positive for eye discharge, eye pain and eye redness. Negative for eye itching, photophobia, double vision and blurred vision.   Gastrointestinal:  Negative for nausea and vomiting.    Objective:     Physical Exam   Constitutional: She appears well-developed. She is cooperative.  Non-toxic appearance. She does not appear ill. No distress.   HENT:   Ears:   Right Ear: Hearing, tympanic membrane, external ear and ear canal normal.   Left Ear: Hearing, tympanic membrane, external ear and ear canal normal.   Nose: Nose normal.   Mouth/Throat: Uvula is midline, oropharynx is clear and moist and mucous membranes are normal.   Eyes: Lids are normal. Pupils are equal, round, and reactive to light. Lids are everted and swept, no foreign bodies found. Right eye exhibits exudate. Right eye exhibits no chemosis. Left eye exhibits no chemosis and no exudate. Right " conjunctiva is injected. Right conjunctiva has no hemorrhage. Left conjunctiva is not injected. Left conjunctiva has no hemorrhage. Right pupil is round, reactive and not sluggish. Left pupil is round, reactive and not sluggish. Extraocular movement intact   Cardiovascular: Normal rate.   Pulmonary/Chest: Effort normal.   Abdominal: Normal appearance.   Neurological: She is alert. Gait normal.   Skin: Skin is not diaphoretic.   Psychiatric: Her speech is normal and behavior is normal. Mood and thought content normal.   Nursing note and vitals reviewed.    Assessment:     1. Bacterial conjunctivitis      Plan:     33 year old presenting 2/2 eye redness and irritation most likely conjunctivitis with generalized scleral injection. PERRLA    Differentials Include: URI, viral syndrome, Otitis Media, Otitis Externa, Strep Pharyngitis, Allergic Conjunctivitis, Viral Conjunctivitis, Bacterial Conjunctivitis . Given the above findings, my overall impression is Conjunctivitis.    Discussed symptomatic relief with warm or cool compresses. Patient will be discharged home with Ofloxacin rx. ER precautions given, patient understands and agrees with plan. All questions answered.  Instructed to follow up with PCP    Bacterial conjunctivitis  -     ofloxacin (OCUFLOX) 0.3 % ophthalmic solution; Place 2 drops into the right eye 4 (four) times daily. For 7 days  Dispense: 10 mL; Refill: 0      Patient Instructions   If you were prescribed a narcotic or controlled medication, do not drive or operate heavy equipment or machinery while taking these medications.  You must understand that you've received an Urgent Care treatment only and that you may be released before all your medical problems are known or treated. You, the patient, will arrange for follow up care as instructed.  Follow up with your PCP or specialty clinic as directed within 2-5 days if not improved or as needed.  You can call [phone] to schedule an appointment  with the appropriate provider.  If your condition worsens we recommend that you receive another evaluation at the emergency room immediately or contact your primary medical clinics after hours call service to discuss your concerns.  Please return here or go to the Emergency Department for any concerns or worsening of condition.

## 2023-07-01 ENCOUNTER — TELEPHONE (OUTPATIENT)
Dept: URGENT CARE | Facility: CLINIC | Age: 33
End: 2023-07-01
Payer: COMMERCIAL

## 2023-07-10 ENCOUNTER — OFFICE VISIT (OUTPATIENT)
Dept: URGENT CARE | Facility: CLINIC | Age: 33
End: 2023-07-10
Payer: COMMERCIAL

## 2023-07-10 VITALS
BODY MASS INDEX: 29.23 KG/M2 | DIASTOLIC BLOOD PRESSURE: 67 MMHG | OXYGEN SATURATION: 97 % | HEART RATE: 100 BPM | SYSTOLIC BLOOD PRESSURE: 112 MMHG | HEIGHT: 63 IN | WEIGHT: 165 LBS | RESPIRATION RATE: 15 BRPM | TEMPERATURE: 99 F

## 2023-07-10 DIAGNOSIS — B96.89 BACTERIAL SINUSITIS: Primary | ICD-10-CM

## 2023-07-10 DIAGNOSIS — H10.9 BACTERIAL CONJUNCTIVITIS: ICD-10-CM

## 2023-07-10 DIAGNOSIS — J32.9 BACTERIAL SINUSITIS: Primary | ICD-10-CM

## 2023-07-10 PROCEDURE — 99213 OFFICE O/P EST LOW 20 MIN: CPT | Mod: S$GLB,,, | Performed by: PHYSICIAN ASSISTANT

## 2023-07-10 PROCEDURE — 99213 PR OFFICE/OUTPT VISIT, EST, LEVL III, 20-29 MIN: ICD-10-PCS | Mod: S$GLB,,, | Performed by: PHYSICIAN ASSISTANT

## 2023-07-10 RX ORDER — ERYTHROMYCIN 5 MG/G
OINTMENT OPHTHALMIC 3 TIMES DAILY
Qty: 3.5 G | Refills: 0 | Status: SHIPPED | OUTPATIENT
Start: 2023-07-10 | End: 2023-07-15

## 2023-07-10 RX ORDER — AZITHROMYCIN 250 MG/1
TABLET, FILM COATED ORAL
Qty: 6 TABLET | Refills: 0 | Status: SHIPPED | OUTPATIENT
Start: 2023-07-10 | End: 2023-07-15

## 2023-07-10 NOTE — PROGRESS NOTES
"Subjective:      Patient ID: Terri Tavera is a 33 y.o. female.    Vitals:  height is 5' 3" (1.6 m) and weight is 74.8 kg (165 lb). Her tympanic temperature is 98.6 °F (37 °C). Her blood pressure is 112/67 and her pulse is 100. Her respiration is 15 and oxygen saturation is 97%.     Chief Complaint: Eye Problem    Pt complaining of pain, redness and drainage in her left eye since this morning that has progressively gotten worse.  Pt had pink eye in her right eye about 2 weeks ago.  Pt also complaining of cough, congestion and yellow mucus when she blows her nose for the last couple of days.  Patient reports that her upper teeth hurt he from the sinus pressure.    Eye Problem   The left eye is affected. This is a new problem. The current episode started today. The problem occurs constantly. The problem has been gradually worsening. There was no injury mechanism. The pain is at a severity of 5/10. The pain is mild. There is Known exposure to pink eye. She Does not wear contacts. Associated symptoms include an eye discharge, eye redness, itching and a recent URI. Pertinent negatives include no blurred vision, double vision, fever, foreign body sensation, nausea, photophobia or vomiting. She has tried eye drops for the symptoms. The treatment provided mild relief.     Constitution: Negative for fever.   Eyes:  Positive for eye discharge, eye itching and eye redness. Negative for photophobia, double vision and blurred vision.   Gastrointestinal:  Negative for nausea and vomiting.    Objective:     Physical Exam   Constitutional: She is oriented to person, place, and time. She appears well-developed. She is cooperative.  Non-toxic appearance. She does not appear ill. No distress.   HENT:   Head: Normocephalic and atraumatic.   Ears:   Right Ear: Hearing and external ear normal.   Left Ear: Hearing and external ear normal.   Nose: Right sinus exhibits maxillary sinus tenderness. Left sinus exhibits maxillary sinus " tenderness.   Mouth/Throat: Uvula is midline, oropharynx is clear and moist and mucous membranes are normal.   Eyes: Conjunctivae and lids are normal. No scleral icterus.   Neck: Neck supple.   Cardiovascular: Normal rate, regular rhythm, normal heart sounds and normal pulses.   Pulmonary/Chest: Effort normal and breath sounds normal. No respiratory distress. She has no decreased breath sounds. She has no wheezes. She has no rhonchi.   Abdominal: Normal appearance.   Musculoskeletal: Normal range of motion.         General: No deformity. Normal range of motion.   Neurological: She is alert and oriented to person, place, and time. She exhibits normal muscle tone. Coordination normal.   Skin: Skin is warm, dry, intact, not diaphoretic and not pale.   Psychiatric: Her speech is normal and behavior is normal. Judgment and thought content normal.   Nursing note and vitals reviewed.    Assessment:     No diagnosis found.    Plan:       There are no diagnoses linked to this encounter.

## 2023-07-13 ENCOUNTER — TELEPHONE (OUTPATIENT)
Dept: URGENT CARE | Facility: CLINIC | Age: 33
End: 2023-07-13
Payer: COMMERCIAL

## 2023-07-26 ENCOUNTER — TELEPHONE (OUTPATIENT)
Dept: INTERNAL MEDICINE | Facility: CLINIC | Age: 33
End: 2023-07-26
Payer: COMMERCIAL

## 2023-07-26 NOTE — TELEPHONE ENCOUNTER
Pt states her appt was today at 3:30 all she needed was questions answered on form and faxed back. Pt states that its now too late and if something changes she will give a call back.

## 2023-07-26 NOTE — TELEPHONE ENCOUNTER
----- Message from Ander Blanco sent at 7/26/2023  8:51 AM CDT -----  Contact: [phone] @ [facility]  Good morning [facility] would like a call back ASAP this morning for a patient chay lopez patient is due to have surgery today. [facility] says they sent over for it in June. Please fax over paper work to [phone]

## 2023-07-26 NOTE — TELEPHONE ENCOUNTER
Dental office call and faxed over form. Pt will be getting a tooth extraction and they will need to clarify what medication pt cannot take. Pt last office visit 1/8/2020

## 2023-08-01 ENCOUNTER — OFFICE VISIT (OUTPATIENT)
Dept: PODIATRY | Facility: CLINIC | Age: 33
End: 2023-08-01
Payer: COMMERCIAL

## 2023-08-01 VITALS — BODY MASS INDEX: 29.23 KG/M2 | HEIGHT: 63 IN | WEIGHT: 165 LBS

## 2023-08-01 DIAGNOSIS — L03.031 PARONYCHIA, TOE, RIGHT: ICD-10-CM

## 2023-08-01 DIAGNOSIS — L60.0 ONYCHOCRYPTOSIS: Primary | ICD-10-CM

## 2023-08-01 PROCEDURE — 11750 PR REMOVAL OF NAIL BED: ICD-10-PCS | Mod: RT,S$GLB,, | Performed by: PODIATRIST

## 2023-08-01 PROCEDURE — 11750 EXCISION NAIL&NAIL MATRIX: CPT | Mod: RT,S$GLB,, | Performed by: PODIATRIST

## 2023-08-01 PROCEDURE — 3008F PR BODY MASS INDEX (BMI) DOCUMENTED: ICD-10-PCS | Mod: CPTII,S$GLB,, | Performed by: PODIATRIST

## 2023-08-01 PROCEDURE — 99214 PR OFFICE/OUTPT VISIT, EST, LEVL IV, 30-39 MIN: ICD-10-PCS | Mod: 25,S$GLB,, | Performed by: PODIATRIST

## 2023-08-01 PROCEDURE — 3008F BODY MASS INDEX DOCD: CPT | Mod: CPTII,S$GLB,, | Performed by: PODIATRIST

## 2023-08-01 PROCEDURE — 99999 PR PBB SHADOW E&M-EST. PATIENT-LVL III: CPT | Mod: PBBFAC,,, | Performed by: PODIATRIST

## 2023-08-01 PROCEDURE — 99999 PR PBB SHADOW E&M-EST. PATIENT-LVL III: ICD-10-PCS | Mod: PBBFAC,,, | Performed by: PODIATRIST

## 2023-08-01 PROCEDURE — 1159F MED LIST DOCD IN RCRD: CPT | Mod: CPTII,S$GLB,, | Performed by: PODIATRIST

## 2023-08-01 PROCEDURE — 99214 OFFICE O/P EST MOD 30 MIN: CPT | Mod: 25,S$GLB,, | Performed by: PODIATRIST

## 2023-08-01 PROCEDURE — 1159F PR MEDICATION LIST DOCUMENTED IN MEDICAL RECORD: ICD-10-PCS | Mod: CPTII,S$GLB,, | Performed by: PODIATRIST

## 2023-08-01 RX ORDER — AMOXICILLIN 500 MG/1
500 TABLET, FILM COATED ORAL 3 TIMES DAILY
COMMUNITY
Start: 2023-07-26 | End: 2023-09-20

## 2023-08-01 NOTE — PROGRESS NOTES
PODIATRIC MEDICINE AND SURGERY   8/1/2023    Reason for Visit   Chief Complaint   Patient presents with    Ingrown Toenail     C/o ingrown toenail, right great toenail, medial border, x 3 weeks, prev nail surgery with phenol done by different provider, 0 pain, non-diabetic, wears sandals         HPI  This is Terri Tavera is a 33 y.o. female who presents today complaining of painful ingrown toenail. Pt states painful toenail for three weeks.  Previous treatment includes nail surgery (2022). Pt would like to proceed with nail removal. Patient denies other pedal complaints at this time. Denies any N/V/F/C/SOB/CP.     PM  Patient Active Problem List    Diagnosis Date Noted    Coccygodynia 03/09/2022    Malignant (primary) neoplasm, unspecified 04/02/2020    Osteoarthritis of spine with radiculopathy, thoracic region 10/22/2019    Right wrist tendonitis 03/22/2018    Ganglion cyst 12/12/2013    ADD (attention deficit disorder) 10/15/2013    Unspecified sinusitis (chronic) 10/29/2012    GERD (gastroesophageal reflux disease) 08/31/2012    Anxiety 08/31/2012    Depression 08/31/2012    IBS (irritable bowel syndrome) 07/10/2012    Hypogammaglobulinaemia, unspecified 07/10/2012    ALL (acute lymphoblastic leukemia) 07/10/2012     Past Medical History:   Diagnosis Date    ALL (acute lymphoblastic leukemia)     Allergy     Anxiety     Depression     Encounter for blood transfusion     Fever blister     History of cholecystectomy     Hypogammaglobulinaemia, unspecified     Irritable bowel syndrome        MEDS  Current Outpatient Medications on File Prior to Visit   Medication Sig Dispense Refill    amoxicillin (AMOXIL) 500 MG Tab Take 500 mg by mouth 3 (three) times daily.      citalopram (CELEXA) 40 MG tablet 40 mg.      DULoxetine (CYMBALTA) 20 MG capsule Take 20 mg by mouth once daily.      ofloxacin (OCUFLOX) 0.3 % ophthalmic solution Place 2 drops into the right eye 4 (four) times daily. For 7 days (Patient not  taking: Reported on 7/10/2023) 10 mL 0    prenat75-iron fum-folic ac-om3 -440 mg-mcg-mg Cmpk Take by mouth.       No current facility-administered medications on file prior to visit.       PSH     Past Surgical History:   Procedure Laterality Date    ABDOMINAL SURGERY  2011    Cholecystectomy     BONE MARROW BIOPSY      Central port and subsequent removal       SECTION  2023    CHOLECYSTECTOMY  2011    lap    INCISION AND DRAINAGE OF WOUND      [employer]    INTRATHECAL PUMP IMPLANTATION      for chemo    TONSILLECTOMY          ALL  Review of patient's allergies indicates:   Allergen Reactions    Cephalosporins Hives     duracef only    Vitamin k analogues     Duricef [cefadroxil] Hives     Hives and fever    Vitamin k      Other reaction(s): Discolored leg skin    Bactrim [sulfamethoxazole-trimethoprim] Nausea And Vomiting       SOC     Social History     Tobacco Use    Smoking status: Never    Smokeless tobacco: Never   Substance Use Topics    Alcohol use: Yes     Alcohol/week: 2.0 standard drinks of alcohol     Types: 2 Glasses of wine per week     Comment: twice/week    Drug use: No       Family HX    Family History   Problem Relation Age of Onset    Ulcerative colitis Father     Heart disease Father     Diabetes Father     Hyperlipidemia Father     Cataracts Father     Hyperlipidemia Mother     Hashimoto's thyroiditis Mother     Cancer Mother         Gallbladder cancer    Thyroid disease Mother         Hashinotos    Ulcerative colitis Paternal Aunt     Breast cancer Paternal Aunt     Diabetes Maternal Grandmother         Parkinsons    Cancer Maternal Grandfather         lung cancer     Ulcerative colitis Paternal Grandmother     Cancer Paternal Grandmother         esophageal cancer     Celiac disease Neg Hx     Crohn's disease Neg Hx     Ovarian cancer Neg Hx     Colon cancer Neg Hx     Melanoma Neg Hx     Amblyopia Neg Hx     Blindness Neg Hx     Glaucoma Neg Hx     Macular  "degeneration Neg Hx     Retinal detachment Neg Hx     Strabismus Neg Hx           REVIEW OF SYSTEMS   General: This patient is well-developed, well-nourished and appears stated age, well-oriented to person, place and time, and cooperative and pleasant on today's visit  Constitutional: Negative for chills and fever.   Respiratory: Negative for shortness of breath.    Cardiovascular: Negative for chest pain, palpitations, orthopnea  Gastrointestinal: Negative for diarrhea, nausea and vomiting.   Musculoskeletal: Positive for above noted in HPI  Skin: Positive for nail changes   Peripheral Vascular: no claudication or cyanosis  Psychiatric/Behavioral: Negative for altered mental status       Vitals:    08/01/23 1358   Weight: 74.8 kg (165 lb)   Height: 5' 3" (1.6 m)   PainSc: 0-No pain       LOWER EXTREMITY PHYSICAL EXAM    VASCULAR  Dorsalis pedis and posterior tibial pulses palpable 2/4 bilaterally.   Capillary refill time immediate to the toes.   Feet are warm to the touch. Skin temperature warm to warm from proximally to distally   There are no ecchymoses noted to bilateral foot and ankle regions.   There is no  edema noted to foot     DERMATOLOGIC  Skin moist with healthy texture and turgor.  Incurvated right  hallux medial border with mild erythema and dry drainage.   There are no open ulcerations, lacerations, or fissures to bilateral foot and ankle regions.   There are no hyperkeratotic lesions noted to feet. Nails are well-trimmed.    NEUROLOGIC  Epicritic sensation is intact as the patient is able to sense light touch to bilateral foot and ankle regions.   Achilles and patellar deep tendon reflexes intact  Babinski reflex absent    ORTHOPEDIC/BIOMECHANICAL  Tenderness to palpation medial border of right hallux.   Muscle strength AT/EHL/EDL/PT: 5/5; Achilles/Gastroc/Soleus: 5/5; PB/PL: 5/5 Muscle tone is normal.  Ankle joint ROM INTACT DF/PF, non-crepitus    ASSESSMENT  1. Onychocryptosis , right hallux "     PLAN    -Discussed presenting problems, etiology, pathologic processes and management options with patient today.     -Patient agrees to proceed with Right Hallux, Matrixectomy and Partial Nail Avulsion. Risks/benefits/possible complications discussed. Verbal and written consent was signed and witnessed by LPN.    Procedure : Partial Nail Chemical Matrixectomy, Right Hallux    The hallux was anesthesized with 3 cc of 1% Lidocaine plain. The area was prepped with betadine scrub and then painted with iodine. A sterile tournicot was placed for hemostasis. The affected border of the involved nail bed was freed from the skin edge and nail bed. Attention was then directed to the affected nail border to separate it from the nail bed. The most proximal nail border at the eponychium was released to the area of the matrix. Using an English anvil, a cut was then made to the nail fold in a longitudinal manner at the distal aspect extending to the proximal aspect of the nail plate thereby releasing the most proximal aspect of the nail plate from the nail bed. Using a straight hemostat, the free nail plate segment was clamped and removed. Using a tissue nipper, residual tissue was then removed. The nail groove area was inspected and probed proximally with a curette for any spicules. No spicules were present. Antibiotic ointment was applied to all exposed flesh to protect it from phenol applications. Utilizing cotton tipped applicators, three applications of 30 sec each were applied at the most proximal area of the nail groove and matrix with phenol.The area was then flushed with sterile saline, and then alcohol.  The tournicot was removed and hyperemia was noted to the digit. The patient tolerated the procedure well. The toe was dressed with antibiotic ointment, gauze, nicolas and coban.    -Patient received instructions for post op care, pain management with OTC analgesics, and soaking.  -If any signs of infection--increased  redness, purulent drainage, increased pain then patient is advised to return to clinic or ER if after clinic hours. Advised to limit activities.  -RTC in 2 weeks for f/u visit, or sooner if any signs of infection noted.    No future appointments.    Report Electronically Signed By:     Carolynn Bajwa DPM   Podiatric Medicine & Surgery  [facility]  8/1/2023

## 2023-08-22 ENCOUNTER — OFFICE VISIT (OUTPATIENT)
Dept: PODIATRY | Facility: CLINIC | Age: 33
End: 2023-08-22
Payer: COMMERCIAL

## 2023-08-22 DIAGNOSIS — L60.0 ONYCHOCRYPTOSIS: Primary | ICD-10-CM

## 2023-08-22 DIAGNOSIS — L03.031 PARONYCHIA, TOE, RIGHT: ICD-10-CM

## 2023-08-22 PROCEDURE — 99999 PR PBB SHADOW E&M-EST. PATIENT-LVL II: ICD-10-PCS | Mod: PBBFAC,,, | Performed by: PODIATRIST

## 2023-08-22 PROCEDURE — 99213 OFFICE O/P EST LOW 20 MIN: CPT | Mod: S$GLB,,, | Performed by: PODIATRIST

## 2023-08-22 PROCEDURE — 1159F MED LIST DOCD IN RCRD: CPT | Mod: CPTII,S$GLB,, | Performed by: PODIATRIST

## 2023-08-22 PROCEDURE — 99213 PR OFFICE/OUTPT VISIT, EST, LEVL III, 20-29 MIN: ICD-10-PCS | Mod: S$GLB,,, | Performed by: PODIATRIST

## 2023-08-22 PROCEDURE — 1159F PR MEDICATION LIST DOCUMENTED IN MEDICAL RECORD: ICD-10-PCS | Mod: CPTII,S$GLB,, | Performed by: PODIATRIST

## 2023-08-22 PROCEDURE — 99999 PR PBB SHADOW E&M-EST. PATIENT-LVL II: CPT | Mod: PBBFAC,,, | Performed by: PODIATRIST

## 2023-08-22 NOTE — PROGRESS NOTES
PODIATRY NOTE    S/   Terri Tavera is a 33 y.o. female, RTC for follow up status post Right hallux partial nail avulsion.  Patient has been soaking and covering the toe as instructed.  Denies any F/C/N/V or pain.  No complaints.      O/    Lower extremity exam:  -Neurovascular status intact.    -Cap refill wnl.    -No edema noted to affected border  -right  hallux nail border clean with minimal dry scab from soaking.    -No erythema or drainage.    -No tenderness to palpation of affected border     A/  1. Onychocryptosis  Note: s/p right hallux  partial nail avulsion, no signs of infection    P/  -Nail border cleaned. No offending agent or spicule noted.    -Okay to resume activities.  No soaking needed.  -RTC prn    Report Electronically Signed By:     Carolynn Bajwa DPM   Podiatric Medicine & Surgery  [facility]  8/22/2023

## 2023-09-20 ENCOUNTER — OFFICE VISIT (OUTPATIENT)
Dept: OTOLARYNGOLOGY | Facility: CLINIC | Age: 33
End: 2023-09-20
Payer: COMMERCIAL

## 2023-09-20 VITALS — WEIGHT: 168.63 LBS | BODY MASS INDEX: 29.88 KG/M2

## 2023-09-20 DIAGNOSIS — J01.01 ACUTE RECURRENT MAXILLARY SINUSITIS: Primary | ICD-10-CM

## 2023-09-20 PROCEDURE — 1159F PR MEDICATION LIST DOCUMENTED IN MEDICAL RECORD: ICD-10-PCS | Mod: CPTII,S$GLB,, | Performed by: PHYSICIAN ASSISTANT

## 2023-09-20 PROCEDURE — 1159F MED LIST DOCD IN RCRD: CPT | Mod: CPTII,S$GLB,, | Performed by: PHYSICIAN ASSISTANT

## 2023-09-20 PROCEDURE — 3008F BODY MASS INDEX DOCD: CPT | Mod: CPTII,S$GLB,, | Performed by: PHYSICIAN ASSISTANT

## 2023-09-20 PROCEDURE — 99214 OFFICE O/P EST MOD 30 MIN: CPT | Mod: S$GLB,,, | Performed by: PHYSICIAN ASSISTANT

## 2023-09-20 PROCEDURE — 99999 PR PBB SHADOW E&M-EST. PATIENT-LVL III: ICD-10-PCS | Mod: PBBFAC,,, | Performed by: PHYSICIAN ASSISTANT

## 2023-09-20 PROCEDURE — 99999 PR PBB SHADOW E&M-EST. PATIENT-LVL III: CPT | Mod: PBBFAC,,, | Performed by: PHYSICIAN ASSISTANT

## 2023-09-20 PROCEDURE — 99214 PR OFFICE/OUTPT VISIT, EST, LEVL IV, 30-39 MIN: ICD-10-PCS | Mod: S$GLB,,, | Performed by: PHYSICIAN ASSISTANT

## 2023-09-20 PROCEDURE — 3008F PR BODY MASS INDEX (BMI) DOCUMENTED: ICD-10-PCS | Mod: CPTII,S$GLB,, | Performed by: PHYSICIAN ASSISTANT

## 2023-09-20 RX ORDER — AMOXICILLIN AND CLAVULANATE POTASSIUM 875; 125 MG/1; MG/1
1 TABLET, FILM COATED ORAL 2 TIMES DAILY
Qty: 20 TABLET | Refills: 0 | Status: SHIPPED | OUTPATIENT
Start: 2023-09-20 | End: 2023-09-30

## 2023-09-20 RX ORDER — PREDNISONE 20 MG/1
TABLET ORAL
Qty: 21 TABLET | Refills: 0 | Status: SHIPPED | OUTPATIENT
Start: 2023-09-20 | End: 2023-11-06

## 2023-09-20 NOTE — PROGRESS NOTES
Subjective:   Patient ID: Terri Tavera is a 33 y.o. female.    Chief Complaint: Sinus Problem (Started 2 mos ago, facial pain , congested , runny nose  )    MAJOR SYMPTOMS:  Yes  Purulent anterior nasal discharge  Yes  Purulent or discolored posterior nasal discharge  Yes  Nasal congestion or obstruction  Yes  Facial Congestion or fullness  Yes  Hyposmia or anosmia  No  Fever    MINOR SYMPTOMS:  Yes  Headache  No  Ear pain, pressure, or fullness  No  Halitosis  Yes  Dental pain  No  Fatigue    The diagnosis of acute sinusitis is based on the presence of at least 2 major or 1 major and at least 2 minor symptoms.  Terri does meet this criteria.  Clinical Practice Guideline for Acute Bacterial Rhinosinusitis in Children and Adults. Clin Infect Dis 2012; 54:e72    Onset:  2 weeks ago  Exacerbating factors: No  Relieving factors: Yes  Timing:  initially improving now worsening       Review of patient's allergies indicates:   Allergen Reactions    Cephalosporins Hives     duracef only    Vitamin k analogues     Duricef [cefadroxil] Hives     Hives and fever    Vitamin k      Other reaction(s): Discolored leg skin    Bactrim [sulfamethoxazole-trimethoprim] Nausea And Vomiting           Review of Systems   Constitutional: Negative.    HENT:  Positive for facial swelling and sinus pressure.    Respiratory:  Positive for cough.    Cardiovascular: Negative.    Gastrointestinal:  Positive for diarrhea and vomiting.   Endocrine: Negative.    Genitourinary: Negative.    Musculoskeletal:  Positive for back pain and neck pain.   Skin: Negative.    Neurological:  Positive for headaches.   Hematological: Negative.    Psychiatric/Behavioral:  Positive for decreased concentration. The patient is nervous/anxious.          Objective:   Wt 76.5 kg (168 lb 10.4 oz)   BMI 29.88 kg/m²     Physical Exam  Constitutional:       General: She is not in acute distress.     Appearance: She is well-developed.   HENT:      Head: Normocephalic and  atraumatic.      Right Ear: Tympanic membrane, ear canal and external ear normal.      Left Ear: Tympanic membrane, ear canal and external ear normal.      Nose: Mucosal edema and congestion present. No nasal deformity, septal deviation or rhinorrhea.      Right Sinus: Maxillary sinus tenderness present. No frontal sinus tenderness.      Left Sinus: Maxillary sinus tenderness present. No frontal sinus tenderness.      Mouth/Throat:      Mouth: Mucous membranes are not pale and not dry.      Dentition: No dental caries.      Pharynx: Uvula midline. No oropharyngeal exudate or posterior oropharyngeal erythema.   Eyes:      General: Lids are normal. No scleral icterus.     Extraocular Movements:      Right eye: Normal extraocular motion and no nystagmus.      Left eye: Normal extraocular motion and no nystagmus.      Conjunctiva/sclera: Conjunctivae normal.      Right eye: Right conjunctiva is not injected. No chemosis.     Left eye: Left conjunctiva is not injected. No chemosis.     Pupils: Pupils are equal, round, and reactive to light.   Neck:      Thyroid: No thyroid mass or thyromegaly.      Trachea: Trachea and phonation normal. No tracheal tenderness or tracheal deviation.   Pulmonary:      Effort: Pulmonary effort is normal. No respiratory distress.      Breath sounds: No stridor.   Abdominal:      General: There is no distension.   Lymphadenopathy:      Head:      Right side of head: No submental, submandibular, preauricular, posterior auricular or occipital adenopathy.      Left side of head: No submental, submandibular, preauricular, posterior auricular or occipital adenopathy.      Cervical: No cervical adenopathy.   Skin:     General: Skin is warm and dry.      Findings: No erythema or rash.   Neurological:      Mental Status: She is alert and oriented to person, place, and time.      Cranial Nerves: No cranial nerve deficit.   Psychiatric:         Behavior: Behavior normal.          Assessment:     1.  Acute recurrent maxillary sinusitis        Plan:     Acute recurrent maxillary sinusitis    Other orders  -     predniSONE (DELTASONE) 20 MG tablet; Take 3 tab in am x 3d, then 2 tab in am x 3d, then 1 tab in am x 3d, then 1/2 tab in am x 3d  Dispense: 21 tablet; Refill: 0  -     amoxicillin-clavulanate 875-125mg (AUGMENTIN) 875-125 mg per tablet; Take 1 tablet by mouth 2 (two) times daily. for 10 days  Dispense: 20 tablet; Refill: 0      I have treated her today with a course of both oral steroids as well as antibiotics. I will se her back in 2 weeks or sooner if needed. If she still symptomatic will plan to order sinus imaging.

## 2023-09-25 ENCOUNTER — OFFICE VISIT (OUTPATIENT)
Dept: PRIMARY CARE CLINIC | Facility: CLINIC | Age: 33
End: 2023-09-25
Payer: COMMERCIAL

## 2023-09-25 VITALS
RESPIRATION RATE: 16 BRPM | WEIGHT: 171.31 LBS | HEIGHT: 63 IN | OXYGEN SATURATION: 98 % | DIASTOLIC BLOOD PRESSURE: 72 MMHG | BODY MASS INDEX: 30.35 KG/M2 | HEART RATE: 98 BPM | SYSTOLIC BLOOD PRESSURE: 118 MMHG | TEMPERATURE: 98 F

## 2023-09-25 DIAGNOSIS — E09.9 STEROID-INDUCED DIABETES MELLITUS, SUBSEQUENT ENCOUNTER: ICD-10-CM

## 2023-09-25 DIAGNOSIS — C91.01 ACUTE LYMPHOBLASTIC LEUKEMIA (ALL) IN REMISSION: ICD-10-CM

## 2023-09-25 DIAGNOSIS — E55.9 VITAMIN D DEFICIENCY: ICD-10-CM

## 2023-09-25 DIAGNOSIS — F41.9 ANXIETY AND DEPRESSION: Primary | ICD-10-CM

## 2023-09-25 DIAGNOSIS — F32.A ANXIETY AND DEPRESSION: Primary | ICD-10-CM

## 2023-09-25 DIAGNOSIS — E11.65 TYPE 2 DIABETES MELLITUS WITH HYPERGLYCEMIA, WITHOUT LONG-TERM CURRENT USE OF INSULIN: ICD-10-CM

## 2023-09-25 DIAGNOSIS — T38.0X5D STEROID-INDUCED DIABETES MELLITUS, SUBSEQUENT ENCOUNTER: ICD-10-CM

## 2023-09-25 DIAGNOSIS — Z86.32 HISTORY OF GESTATIONAL DIABETES: ICD-10-CM

## 2023-09-25 PROBLEM — M77.8 RIGHT WRIST TENDONITIS: Status: RESOLVED | Noted: 2018-03-22 | Resolved: 2023-09-25

## 2023-09-25 PROBLEM — C80.1 MALIGNANT (PRIMARY) NEOPLASM, UNSPECIFIED: Status: RESOLVED | Noted: 2020-04-02 | Resolved: 2023-09-25

## 2023-09-25 PROBLEM — M47.24 OSTEOARTHRITIS OF SPINE WITH RADICULOPATHY, THORACIC REGION: Status: RESOLVED | Noted: 2019-10-22 | Resolved: 2023-09-25

## 2023-09-25 PROBLEM — T38.0X5A STEROID-INDUCED DIABETES MELLITUS (CORRECT AND PROPERLY ADMINISTERED): Status: ACTIVE | Noted: 2023-09-25

## 2023-09-25 PROBLEM — M53.3 COCCYGODYNIA: Status: RESOLVED | Noted: 2022-03-09 | Resolved: 2023-09-25

## 2023-09-25 PROCEDURE — 3074F PR MOST RECENT SYSTOLIC BLOOD PRESSURE < 130 MM HG: ICD-10-PCS | Mod: CPTII,S$GLB,, | Performed by: FAMILY MEDICINE

## 2023-09-25 PROCEDURE — 3008F PR BODY MASS INDEX (BMI) DOCUMENTED: ICD-10-PCS | Mod: CPTII,S$GLB,, | Performed by: FAMILY MEDICINE

## 2023-09-25 PROCEDURE — 3078F PR MOST RECENT DIASTOLIC BLOOD PRESSURE < 80 MM HG: ICD-10-PCS | Mod: CPTII,S$GLB,, | Performed by: FAMILY MEDICINE

## 2023-09-25 PROCEDURE — 3008F BODY MASS INDEX DOCD: CPT | Mod: CPTII,S$GLB,, | Performed by: FAMILY MEDICINE

## 2023-09-25 PROCEDURE — 1159F MED LIST DOCD IN RCRD: CPT | Mod: CPTII,S$GLB,, | Performed by: FAMILY MEDICINE

## 2023-09-25 PROCEDURE — 3074F SYST BP LT 130 MM HG: CPT | Mod: CPTII,S$GLB,, | Performed by: FAMILY MEDICINE

## 2023-09-25 PROCEDURE — 1159F PR MEDICATION LIST DOCUMENTED IN MEDICAL RECORD: ICD-10-PCS | Mod: CPTII,S$GLB,, | Performed by: FAMILY MEDICINE

## 2023-09-25 PROCEDURE — 99999 PR PBB SHADOW E&M-EST. PATIENT-LVL III: ICD-10-PCS | Mod: PBBFAC,,, | Performed by: FAMILY MEDICINE

## 2023-09-25 PROCEDURE — 99205 PR OFFICE/OUTPT VISIT, NEW, LEVL V, 60-74 MIN: ICD-10-PCS | Mod: S$GLB,,, | Performed by: FAMILY MEDICINE

## 2023-09-25 PROCEDURE — 3078F DIAST BP <80 MM HG: CPT | Mod: CPTII,S$GLB,, | Performed by: FAMILY MEDICINE

## 2023-09-25 PROCEDURE — 99999 PR PBB SHADOW E&M-EST. PATIENT-LVL III: CPT | Mod: PBBFAC,,, | Performed by: FAMILY MEDICINE

## 2023-09-25 PROCEDURE — 99205 OFFICE O/P NEW HI 60 MIN: CPT | Mod: S$GLB,,, | Performed by: FAMILY MEDICINE

## 2023-09-25 RX ORDER — TIRZEPATIDE 2.5 MG/.5ML
2.5 INJECTION, SOLUTION SUBCUTANEOUS
Qty: 4 PEN | Refills: 5 | Status: SHIPPED | OUTPATIENT
Start: 2023-09-25

## 2023-09-25 RX ORDER — CLONAZEPAM 1 MG/1
1 TABLET ORAL 2 TIMES DAILY PRN
COMMUNITY

## 2023-09-25 NOTE — ASSESSMENT & PLAN NOTE
Controlled on combination Cymbalta and Celexa.  Takes Klonopin only as needed.  Doing well on this combo.

## 2023-09-25 NOTE — ASSESSMENT & PLAN NOTE
Did not do well on metformin, we will start Mounjaro at low dose and increase as tolerated.  We will plan to get labs after she completes her steroids in the next 3-4 weeks

## 2023-09-25 NOTE — PROGRESS NOTES
"    [facility]       [address] [place] [address]      [phone]     Eryn Gong MD         .      Office Visit  2023  [id number]      SUBJECTIVE     HPI:  Terri Tavera is a 33 y.o. female presents today in clinic for "Establish Care  ."   Patient is here to establish care- her main concerns are weight related.  Patient is now nearly one year postpartum with twins s/p c section. Patient had started gaining weight before she got pregnant, now not able to get to goal weight. She is currently on cymbalta and celexa and has done well with her anxiety.  Patient idea goal weight is 140#.  She gained unexplained weight in  for no clear reason./  she has been on different diets but nothing seems to help  patient is currently following mediterranean diet.  Patient was on large doses of dexamethasone requiring insulin shots for two years but has been able to control with diet now. Patient previously took metformin but it had GI side effects        ROS   Review of symptoms are negative except as noted.     PAST MEDICAL HISTORY     Past Medical History:   Diagnosis Date    ALL (acute lymphoblastic leukemia)     Allergy     Anxiety     Depression     Encounter for blood transfusion     Fever blister     History of cholecystectomy     Hypogammaglobulinaemia, unspecified     Irritable bowel syndrome        Past Surgical History:   Procedure Laterality Date    ABDOMINAL SURGERY  2011    Cholecystectomy 2011    BONE MARROW BIOPSY      Central port and subsequent removal       SECTION  2023    CHOLECYSTECTOMY  2011    lap    INCISION AND DRAINAGE OF WOUND      [employer]    INTRATHECAL PUMP IMPLANTATION      for chemo    MULTIPLE TOOTH EXTRACTIONS      TONSILLECTOMY         Social History     Socioeconomic History    Marital status:    Tobacco Use    Smoking status: Never    Smokeless tobacco: Never   Substance and Sexual " Activity    Alcohol use: Yes     Alcohol/week: 2.0 standard drinks of alcohol     Types: 2 Glasses of wine per week     Comment: twice/week    Drug use: Never    Sexual activity: Yes     Partners: Male     Birth control/protection: I.U.D.     Comment: IUD put back in 03/2023     Social Determinants of Health     Financial Resource Strain: Medium Risk (1/7/2020)    Overall Financial Resource Strain (CARDIA)     Difficulty of Paying Living Expenses: Somewhat hard   Food Insecurity: No Food Insecurity (1/7/2020)    Hunger Vital Sign     Worried About Running Out of Food in the Last Year: Never true     Ran Out of Food in the Last Year: Never true   Transportation Needs: No Transportation Needs (1/7/2020)    PRAPARE - Transportation     Lack of Transportation (Medical): No     Lack of Transportation (Non-Medical): No   Physical Activity: Sufficiently Active (1/7/2020)    Exercise Vital Sign     Days of Exercise per Week: 5 days     Minutes of Exercise per Session: 150+ min   Stress: Stress Concern Present (1/7/2020)    [employer] [place] [place] [employer] - Occupational Stress Questionnaire     Feeling of Stress : To some extent   Social Connections: Unknown (1/7/2020)    Social Connection and Isolation Panel [NHANES]     Frequency of Social Gatherings with Friends and Family: Once a week     Active Member of Clubs or Organizations: No     Marital Status: Living with partner       Allergies as of 09/25/2023 - Reviewed 09/25/2023   Allergen Reaction Noted    Cephalosporins Hives 01/05/2006    Vitamin k analogues  07/10/2012    Duricef [cefadroxil] Hives 07/10/2012    Vitamin k  12/23/2005    Bactrim [sulfamethoxazole-trimethoprim] Nausea And Vomiting 07/10/2012       HOME MEDS     Current Outpatient Medications   Medication Instructions    amoxicillin-clavulanate 875-125mg (AUGMENTIN) 875-125 mg per tablet 1 tablet, Oral, 2 times daily    citalopram (CELEXA) 40 mg, Oral, Daily    clonazePAM (KLONOPIN) 1 mg, Oral, 2  "times daily PRN    DULoxetine (CYMBALTA) 20 mg, Oral, Daily    MOUNJARO 2.5 mg, Subcutaneous, Every 7 days    predniSONE (DELTASONE) 20 MG tablet Take 3 tab in am x 3d, then 2 tab in am x 3d, then 1 tab in am x 3d, then 1/2 tab in am x 3d       The following were updated and reviewed by myself in the chart: medications, past medical history, past surgical history, family history, social history, and allergies.        Objective    OBJECTIVE   /72   Pulse 98   Temp 98.1 °F (36.7 °C) (Oral)   Resp 16   Ht 5' 3" (1.6 m)   Wt 77.7 kg (171 lb 4.8 oz)   SpO2 98%   BMI 30.34 kg/m²     Wt Readings from Last 2 Encounters:   09/25/23 77.7 kg (171 lb 4.8 oz)   09/20/23 76.5 kg (168 lb 10.4 oz)       BP Readings from Last 2 Encounters:   09/25/23 118/72   07/10/23 112/67          Physical Exam  Vitals and nursing note reviewed.   Constitutional:       Appearance: Normal appearance. She is obese.      Comments: Appears fatigue   HENT:      Head: Normocephalic and atraumatic.      Nose: Nose normal.   Eyes:      Extraocular Movements: Extraocular movements intact.      Conjunctiva/sclera: Conjunctivae normal.      Pupils: Pupils are equal, round, and reactive to light.   Cardiovascular:      Rate and Rhythm: Normal rate and regular rhythm.   Pulmonary:      Effort: Pulmonary effort is normal.      Breath sounds: Normal breath sounds.   Abdominal:      General: Abdomen is flat.      Palpations: Abdomen is soft.      Tenderness: There is no abdominal tenderness.      Comments: Increased abdominal girth   Musculoskeletal:         General: No swelling or tenderness. Normal range of motion.      Cervical back: Normal range of motion.   Lymphadenopathy:      Cervical: No cervical adenopathy.   Skin:     General: Skin is warm.      Findings: No lesion or rash.   Neurological:      General: No focal deficit present.      Mental Status: She is alert. Mental status is at baseline.   Psychiatric:         Mood and Affect: Mood " normal.         Behavior: Behavior normal.               LABS      LAB RESULTS, IF AVAILABLE, ARE DISCUSSED WITH PATIENT AND POSTED TO PATIENT PORTAL     ASSESSMENT & PLAN     1. Anxiety and depression  Overview:  It is advised to identify triggers for anxiety and consider the impact of anxious thinking on functioning. Discussed strategies to regulate symptoms and need for compliance with treatment.  Therapy or counseling may be necessary if current regimen is ineffective. Patient will report any worsening or change in anxiety symptoms if necessary.      Assessment & Plan:  Controlled on combination Cymbalta and Celexa.  Takes Klonopin only as needed.  Doing well on this combo.      2. Acute lymphoblastic leukemia (ALL) in remission  Comments:  in 2002, has been in remission since 2002    3. History of gestational diabetes    4. Steroid-induced diabetes mellitus, subsequent encounter  -     CBC Without Differential; Future; Expected date: 09/25/2023  -     Comprehensive Metabolic Panel; Future; Expected date: 09/25/2023  -     Insulin, Random; Future; Expected date: 09/25/2023  -     C-Peptide; Future; Expected date: 09/25/2023    5. Type 2 diabetes mellitus with hyperglycemia, without long-term current use of insulin  Overview:  Likely steroid induced, with a history of gestational diabetes    Assessment & Plan:  Did not do well on metformin, we will start Mounjaro at low dose and increase as tolerated.  We will plan to get labs after she completes her steroids in the next 3-4 weeks    Orders:  -     tirzepatide (MOUNJARO) 2.5 mg/0.5 mL PnIj; Inject 2.5 mg into the skin every 7 days.  Dispense: 4 pen ; Refill: 5  -     Lipid Panel; Future; Expected date: 09/25/2023  -     Hemoglobin A1C; Future; Expected date: 09/25/2023    6. BMI 30.0-30.9,adult  Overview:  There are multiple etiologies of weight gain. A  weight loss plan that focuses on diet, exercise and good healthy lifestyle changes are a necessity to help  "combat obesity. Medication and/orsurgical options are available;  unfortunately,  many options may not be approved by insurance nor FDA approved for obesity but could be very beneficial. There are possible risks associated with therapeutic options and patient should notify us of any concerns. Patients should adhere to plan and follow up routinely as directed.     Orders:  -     TSH; Future; Expected date: 09/25/2023  -     T4, Free; Future; Expected date: 09/25/2023    7. Vitamin D deficiency  -     Vitamin D 25-Hydroxy; Future; Expected date: 09/25/2023          I spent a total of 60 minutes face to face and non-face to face on the date of this visit.This includes time preparing to see the patient (eg, review of tests, notes), obtaining and/or reviewing additional history from an independent historian and/or outside medical records, documenting clinical information in the electronic health record, independently interpreting results and/or communicating results to the patient/family/caregiver, or care coordinator.      Disclaimer: Portions of this record may have been created with voice recognition software. Occasional wrong-word or "sound-a-like" substitutions may have occurred due to inherent limitations of voice recognition software. Read the chart carefully and recognize, using context, where substitutions have occurred."    Signed by:  Eryn Gong MD           "

## 2023-09-28 ENCOUNTER — WALK IN (OUTPATIENT)
Dept: URGENT CARE | Age: 33
End: 2023-09-28

## 2023-09-28 VITALS
HEART RATE: 82 BPM | TEMPERATURE: 98.4 F | RESPIRATION RATE: 16 BRPM | OXYGEN SATURATION: 98 % | DIASTOLIC BLOOD PRESSURE: 83 MMHG | SYSTOLIC BLOOD PRESSURE: 128 MMHG

## 2023-09-28 DIAGNOSIS — J02.9 SORE THROAT: ICD-10-CM

## 2023-09-28 DIAGNOSIS — J02.9 VIRAL PHARYNGITIS: Primary | ICD-10-CM

## 2023-09-28 LAB — S PYO DNA THROAT QL NAA+PROBE: NOT DETECTED

## 2023-09-28 PROCEDURE — 87651 STREP A DNA AMP PROBE: CPT | Performed by: INTERNAL MEDICINE

## 2023-09-28 PROCEDURE — 99214 OFFICE O/P EST MOD 30 MIN: CPT | Performed by: FAMILY MEDICINE

## 2023-09-28 RX ORDER — PREDNISONE 20 MG/1
60 TABLET ORAL ONCE
Qty: 3 TABLET | Refills: 0 | Status: SHIPPED | OUTPATIENT
Start: 2023-09-28 | End: 2023-09-28

## 2023-09-28 ASSESSMENT — ENCOUNTER SYMPTOMS
EYES NEGATIVE: 1
RESPIRATORY NEGATIVE: 1
ENDOCRINE NEGATIVE: 1
SORE THROAT: 1
GASTROINTESTINAL NEGATIVE: 1
CONSTITUTIONAL NEGATIVE: 1

## 2023-10-04 ENCOUNTER — OFFICE VISIT (OUTPATIENT)
Dept: OTOLARYNGOLOGY | Facility: CLINIC | Age: 33
End: 2023-10-04
Payer: COMMERCIAL

## 2023-10-04 VITALS — WEIGHT: 170.63 LBS | BODY MASS INDEX: 30.23 KG/M2

## 2023-10-04 DIAGNOSIS — J01.01 ACUTE RECURRENT MAXILLARY SINUSITIS: Primary | ICD-10-CM

## 2023-10-04 DIAGNOSIS — R09.82 PND (POST-NASAL DRIP): ICD-10-CM

## 2023-10-04 PROCEDURE — 1159F PR MEDICATION LIST DOCUMENTED IN MEDICAL RECORD: ICD-10-PCS | Mod: CPTII,S$GLB,, | Performed by: PHYSICIAN ASSISTANT

## 2023-10-04 PROCEDURE — 3008F BODY MASS INDEX DOCD: CPT | Mod: CPTII,S$GLB,, | Performed by: PHYSICIAN ASSISTANT

## 2023-10-04 PROCEDURE — 99213 PR OFFICE/OUTPT VISIT, EST, LEVL III, 20-29 MIN: ICD-10-PCS | Mod: S$GLB,,, | Performed by: PHYSICIAN ASSISTANT

## 2023-10-04 PROCEDURE — 99213 OFFICE O/P EST LOW 20 MIN: CPT | Mod: S$GLB,,, | Performed by: PHYSICIAN ASSISTANT

## 2023-10-04 PROCEDURE — 99999 PR PBB SHADOW E&M-EST. PATIENT-LVL II: ICD-10-PCS | Mod: PBBFAC,,, | Performed by: PHYSICIAN ASSISTANT

## 2023-10-04 PROCEDURE — 1159F MED LIST DOCD IN RCRD: CPT | Mod: CPTII,S$GLB,, | Performed by: PHYSICIAN ASSISTANT

## 2023-10-04 PROCEDURE — 99999 PR PBB SHADOW E&M-EST. PATIENT-LVL II: CPT | Mod: PBBFAC,,, | Performed by: PHYSICIAN ASSISTANT

## 2023-10-04 PROCEDURE — 3008F PR BODY MASS INDEX (BMI) DOCUMENTED: ICD-10-PCS | Mod: CPTII,S$GLB,, | Performed by: PHYSICIAN ASSISTANT

## 2023-10-04 NOTE — PROGRESS NOTES
Subjective:   Patient ID: Terri Tavera is a 33 y.o. female.    Chief Complaint: Follow-up (Pt is coming in today for a follow up with her sinuses pt states that she has a little lingering cough but mostly everything else has cleared up)    Ms. Tavera is a very pleasant 32 yo female here to see me today for follow up of sinus infection. She has completed oral steroids and antibiotics and feeling much better. Still has a little residual cough and PND.       Review of patient's allergies indicates:   Allergen Reactions    Cephalosporins Hives     duracef only    Vitamin k analogues     Duricef [cefadroxil] Hives     Hives and fever    Vitamin k      Other reaction(s): Discolored leg skin    Bactrim [sulfamethoxazole-trimethoprim] Nausea And Vomiting           Review of Systems   Constitutional:  Negative for chills, fatigue, fever and unexpected weight change.   HENT:  Positive for postnasal drip. Negative for congestion, dental problem, ear discharge, ear pain, facial swelling, hearing loss, nosebleeds, rhinorrhea, sinus pressure, sneezing, sore throat, tinnitus, trouble swallowing and voice change.    Eyes:  Negative for redness, itching and visual disturbance.   Respiratory:  Negative for cough, choking, shortness of breath and wheezing.    Cardiovascular:  Negative for chest pain and palpitations.   Gastrointestinal:  Negative for abdominal pain.        No reflux.   Musculoskeletal:  Negative for gait problem.   Skin:  Negative for rash.   Neurological:  Negative for dizziness, light-headedness and headaches.         Objective:   Wt 77.4 kg (170 lb 10.2 oz)   BMI 30.23 kg/m²     Physical Exam  Constitutional:       General: She is not in acute distress.     Appearance: She is well-developed.   HENT:      Head: Normocephalic and atraumatic.      Right Ear: Tympanic membrane, ear canal and external ear normal.      Left Ear: Tympanic membrane, ear canal and external ear normal.      Nose: Nose normal. No nasal  deformity, septal deviation, mucosal edema or rhinorrhea.      Right Sinus: No maxillary sinus tenderness or frontal sinus tenderness.      Left Sinus: No maxillary sinus tenderness or frontal sinus tenderness.      Mouth/Throat:      Mouth: Mucous membranes are not pale and not dry.      Dentition: No dental caries.      Pharynx: Uvula midline. No oropharyngeal exudate or posterior oropharyngeal erythema.   Eyes:      General: Lids are normal. No scleral icterus.     Extraocular Movements:      Right eye: Normal extraocular motion and no nystagmus.      Left eye: Normal extraocular motion and no nystagmus.      Conjunctiva/sclera: Conjunctivae normal.      Right eye: Right conjunctiva is not injected. No chemosis.     Left eye: Left conjunctiva is not injected. No chemosis.     Pupils: Pupils are equal, round, and reactive to light.   Neck:      Thyroid: No thyroid mass or thyromegaly.      Trachea: Trachea and phonation normal. No tracheal tenderness or tracheal deviation.   Pulmonary:      Effort: Pulmonary effort is normal. No respiratory distress.      Breath sounds: No stridor.   Abdominal:      General: There is no distension.   Lymphadenopathy:      Head:      Right side of head: No submental, submandibular, preauricular, posterior auricular or occipital adenopathy.      Left side of head: No submental, submandibular, preauricular, posterior auricular or occipital adenopathy.      Cervical: No cervical adenopathy.   Skin:     General: Skin is warm and dry.      Findings: No erythema or rash.   Neurological:      Mental Status: She is alert and oriented to person, place, and time.      Cranial Nerves: No cranial nerve deficit.   Psychiatric:         Behavior: Behavior normal.            Assessment:     1. Acute recurrent maxillary sinusitis    2. PND (post-nasal drip)        Plan:     Acute recurrent maxillary sinusitis    PND (post-nasal drip)      Sinusitis has resolved. Continue current allergy regimen.  She may rtc as needed.

## 2023-10-23 ENCOUNTER — LAB VISIT (OUTPATIENT)
Dept: LAB | Facility: HOSPITAL | Age: 33
End: 2023-10-23
Attending: FAMILY MEDICINE
Payer: COMMERCIAL

## 2023-10-23 DIAGNOSIS — E55.9 VITAMIN D DEFICIENCY: ICD-10-CM

## 2023-10-23 DIAGNOSIS — E11.65 TYPE 2 DIABETES MELLITUS WITH HYPERGLYCEMIA, WITHOUT LONG-TERM CURRENT USE OF INSULIN: ICD-10-CM

## 2023-10-23 DIAGNOSIS — E09.9 STEROID-INDUCED DIABETES MELLITUS, SUBSEQUENT ENCOUNTER: ICD-10-CM

## 2023-10-23 DIAGNOSIS — T38.0X5D STEROID-INDUCED DIABETES MELLITUS, SUBSEQUENT ENCOUNTER: ICD-10-CM

## 2023-10-23 LAB
ALBUMIN SERPL BCP-MCNC: 4.1 G/DL (ref 3.5–5.2)
ALP SERPL-CCNC: 88 U/L (ref 55–135)
ALT SERPL W/O P-5'-P-CCNC: 23 U/L (ref 10–44)
ANION GAP SERPL CALC-SCNC: 10 MMOL/L (ref 8–16)
AST SERPL-CCNC: 20 U/L (ref 10–40)
BILIRUB SERPL-MCNC: 1 MG/DL (ref 0.1–1)
BUN SERPL-MCNC: 12 MG/DL (ref 6–20)
CALCIUM SERPL-MCNC: 9.2 MG/DL (ref 8.7–10.5)
CHLORIDE SERPL-SCNC: 104 MMOL/L (ref 95–110)
CHOLEST SERPL-MCNC: 162 MG/DL (ref 120–199)
CHOLEST/HDLC SERPL: 3.8 {RATIO} (ref 2–5)
CO2 SERPL-SCNC: 24 MMOL/L (ref 23–29)
CREAT SERPL-MCNC: 0.8 MG/DL (ref 0.5–1.4)
ERYTHROCYTE [DISTWIDTH] IN BLOOD BY AUTOMATED COUNT: 12.3 % (ref 11.5–14.5)
EST. GFR  (NO RACE VARIABLE): >60 ML/MIN/1.73 M^2
ESTIMATED AVG GLUCOSE: 97 MG/DL (ref 68–131)
GLUCOSE SERPL-MCNC: 91 MG/DL (ref 70–110)
HBA1C MFR BLD: 5 % (ref 4–5.6)
HCT VFR BLD AUTO: 40.8 % (ref 37–48.5)
HDLC SERPL-MCNC: 43 MG/DL (ref 40–75)
HDLC SERPL: 26.5 % (ref 20–50)
HGB BLD-MCNC: 14.1 G/DL (ref 12–16)
LDLC SERPL CALC-MCNC: 96.6 MG/DL (ref 63–159)
MCH RBC QN AUTO: 29 PG (ref 27–31)
MCHC RBC AUTO-ENTMCNC: 34.6 G/DL (ref 32–36)
MCV RBC AUTO: 84 FL (ref 82–98)
NONHDLC SERPL-MCNC: 119 MG/DL
PLATELET # BLD AUTO: 273 K/UL (ref 150–450)
PMV BLD AUTO: 10.3 FL (ref 9.2–12.9)
POTASSIUM SERPL-SCNC: 4.1 MMOL/L (ref 3.5–5.1)
PROT SERPL-MCNC: 7.2 G/DL (ref 6–8.4)
RBC # BLD AUTO: 4.87 M/UL (ref 4–5.4)
SODIUM SERPL-SCNC: 138 MMOL/L (ref 136–145)
T4 FREE SERPL-MCNC: 0.87 NG/DL (ref 0.71–1.51)
TRIGL SERPL-MCNC: 112 MG/DL (ref 30–150)
TSH SERPL DL<=0.005 MIU/L-ACNC: 0.71 UIU/ML (ref 0.4–4)
WBC # BLD AUTO: 6.49 K/UL (ref 3.9–12.7)

## 2023-10-23 PROCEDURE — 36415 COLL VENOUS BLD VENIPUNCTURE: CPT | Mod: PN | Performed by: FAMILY MEDICINE

## 2023-10-23 PROCEDURE — 85027 COMPLETE CBC AUTOMATED: CPT | Performed by: FAMILY MEDICINE

## 2023-10-23 PROCEDURE — 83036 HEMOGLOBIN GLYCOSYLATED A1C: CPT | Performed by: FAMILY MEDICINE

## 2023-10-23 PROCEDURE — 80053 COMPREHEN METABOLIC PANEL: CPT | Performed by: FAMILY MEDICINE

## 2023-10-23 PROCEDURE — 80061 LIPID PANEL: CPT | Performed by: FAMILY MEDICINE

## 2023-10-23 PROCEDURE — 83525 ASSAY OF INSULIN: CPT | Performed by: FAMILY MEDICINE

## 2023-10-23 PROCEDURE — 84681 ASSAY OF C-PEPTIDE: CPT | Performed by: FAMILY MEDICINE

## 2023-10-23 PROCEDURE — 84439 ASSAY OF FREE THYROXINE: CPT | Performed by: FAMILY MEDICINE

## 2023-10-23 PROCEDURE — 82306 VITAMIN D 25 HYDROXY: CPT | Performed by: FAMILY MEDICINE

## 2023-10-23 PROCEDURE — 84443 ASSAY THYROID STIM HORMONE: CPT | Performed by: FAMILY MEDICINE

## 2023-10-24 LAB
25(OH)D3+25(OH)D2 SERPL-MCNC: 31 NG/ML (ref 30–96)
C PEPTIDE SERPL-MCNC: 2.65 NG/ML (ref 0.78–5.19)
INSULIN COLLECTION INTERVAL: NORMAL
INSULIN SERPL-ACNC: 7.5 UU/ML

## 2023-11-06 ENCOUNTER — OFFICE VISIT (OUTPATIENT)
Dept: PRIMARY CARE CLINIC | Facility: CLINIC | Age: 33
End: 2023-11-06
Payer: COMMERCIAL

## 2023-11-06 VITALS
HEIGHT: 63 IN | OXYGEN SATURATION: 98 % | HEART RATE: 95 BPM | BODY MASS INDEX: 29.21 KG/M2 | SYSTOLIC BLOOD PRESSURE: 100 MMHG | WEIGHT: 164.88 LBS | DIASTOLIC BLOOD PRESSURE: 70 MMHG | TEMPERATURE: 98 F

## 2023-11-06 DIAGNOSIS — T38.0X5D STEROID-INDUCED DIABETES MELLITUS, SUBSEQUENT ENCOUNTER: ICD-10-CM

## 2023-11-06 DIAGNOSIS — E11.65 TYPE 2 DIABETES MELLITUS WITH HYPERGLYCEMIA, WITHOUT LONG-TERM CURRENT USE OF INSULIN: Primary | ICD-10-CM

## 2023-11-06 DIAGNOSIS — M79.10 MUSCLE PAIN: ICD-10-CM

## 2023-11-06 DIAGNOSIS — E55.9 VITAMIN D DEFICIENCY: ICD-10-CM

## 2023-11-06 DIAGNOSIS — E09.9 STEROID-INDUCED DIABETES MELLITUS, SUBSEQUENT ENCOUNTER: ICD-10-CM

## 2023-11-06 PROCEDURE — 3008F BODY MASS INDEX DOCD: CPT | Mod: CPTII,S$GLB,, | Performed by: FAMILY MEDICINE

## 2023-11-06 PROCEDURE — 3044F HG A1C LEVEL LT 7.0%: CPT | Mod: CPTII,S$GLB,, | Performed by: FAMILY MEDICINE

## 2023-11-06 PROCEDURE — 99215 OFFICE O/P EST HI 40 MIN: CPT | Mod: S$GLB,,, | Performed by: FAMILY MEDICINE

## 2023-11-06 PROCEDURE — 3078F PR MOST RECENT DIASTOLIC BLOOD PRESSURE < 80 MM HG: ICD-10-PCS | Mod: CPTII,S$GLB,, | Performed by: FAMILY MEDICINE

## 2023-11-06 PROCEDURE — 3078F DIAST BP <80 MM HG: CPT | Mod: CPTII,S$GLB,, | Performed by: FAMILY MEDICINE

## 2023-11-06 PROCEDURE — 3074F PR MOST RECENT SYSTOLIC BLOOD PRESSURE < 130 MM HG: ICD-10-PCS | Mod: CPTII,S$GLB,, | Performed by: FAMILY MEDICINE

## 2023-11-06 PROCEDURE — 3074F SYST BP LT 130 MM HG: CPT | Mod: CPTII,S$GLB,, | Performed by: FAMILY MEDICINE

## 2023-11-06 PROCEDURE — 1159F MED LIST DOCD IN RCRD: CPT | Mod: CPTII,S$GLB,, | Performed by: FAMILY MEDICINE

## 2023-11-06 PROCEDURE — 3044F PR MOST RECENT HEMOGLOBIN A1C LEVEL <7.0%: ICD-10-PCS | Mod: CPTII,S$GLB,, | Performed by: FAMILY MEDICINE

## 2023-11-06 PROCEDURE — 3008F PR BODY MASS INDEX (BMI) DOCUMENTED: ICD-10-PCS | Mod: CPTII,S$GLB,, | Performed by: FAMILY MEDICINE

## 2023-11-06 PROCEDURE — 99999 PR PBB SHADOW E&M-EST. PATIENT-LVL III: ICD-10-PCS | Mod: PBBFAC,,, | Performed by: FAMILY MEDICINE

## 2023-11-06 PROCEDURE — 99215 PR OFFICE/OUTPT VISIT, EST, LEVL V, 40-54 MIN: ICD-10-PCS | Mod: S$GLB,,, | Performed by: FAMILY MEDICINE

## 2023-11-06 PROCEDURE — 1159F PR MEDICATION LIST DOCUMENTED IN MEDICAL RECORD: ICD-10-PCS | Mod: CPTII,S$GLB,, | Performed by: FAMILY MEDICINE

## 2023-11-06 PROCEDURE — 99999 PR PBB SHADOW E&M-EST. PATIENT-LVL III: CPT | Mod: PBBFAC,,, | Performed by: FAMILY MEDICINE

## 2023-11-06 RX ORDER — METHOCARBAMOL 500 MG/1
500 TABLET, FILM COATED ORAL 4 TIMES DAILY PRN
Qty: 30 TABLET | Refills: 3 | Status: SHIPPED | OUTPATIENT
Start: 2023-11-06

## 2023-11-06 RX ORDER — ERGOCALCIFEROL 1.25 MG/1
50000 CAPSULE ORAL
Qty: 4 CAPSULE | Refills: 5 | Status: SHIPPED | OUTPATIENT
Start: 2023-11-06

## 2023-11-06 RX ORDER — TIRZEPATIDE 5 MG/.5ML
5 INJECTION, SOLUTION SUBCUTANEOUS
Qty: 4 PEN | Refills: 5 | Status: SHIPPED | OUTPATIENT
Start: 2023-11-06

## 2023-11-06 NOTE — PROGRESS NOTES
"    [facility]       [address] [place] [address]      [phone]       [phone]     Eryn Gong MD         .      Office Visit  2023  [id number]      SUBJECTIVE     HPI:  Terri Tavera is a 33 y.o. female presents today in clinic for "Follow-up  ."   Patient is here for routine new patient lab follow-up.  Since her last visit, she started the Mounjaro and seems to be tolerating well.  She is lost about 6 lb.  She does notice improvement in her appetite and craving for sweets.        ROS   Review of symptoms are negative except as noted.     PAST MEDICAL HISTORY     Past Medical History:   Diagnosis Date    ALL (acute lymphoblastic leukemia)     Allergy     Anxiety     Depression     Encounter for blood transfusion     Fever blister     History of cholecystectomy     Hypogammaglobulinaemia, unspecified     Irritable bowel syndrome        Past Surgical History:   Procedure Laterality Date    ABDOMINAL SURGERY  2011    Cholecystectomy     BONE MARROW BIOPSY      Central port and subsequent removal       SECTION  2023    CHOLECYSTECTOMY  2011    lap    impant screws      INCISION AND DRAINAGE OF WOUND      [employer]    INTRATHECAL PUMP IMPLANTATION      for chemo    MULTIPLE TOOTH EXTRACTIONS      TONSILLECTOMY         Social History     Socioeconomic History    Marital status:    Tobacco Use    Smoking status: Never    Smokeless tobacco: Never   Substance and Sexual Activity    Alcohol use: Yes     Alcohol/week: 2.0 standard drinks of alcohol     Types: 2 Glasses of wine per week     Comment: twice/week    Drug use: Never    Sexual activity: Yes     Partners: Male     Birth control/protection: I.U.D.     Comment: IUD put back in 2023     Social Determinants of Health     Financial Resource Strain: Medium Risk (2020)    Overall Financial Resource Strain (CARDIA)     Difficulty of Paying Living Expenses: Somewhat hard "   Food Insecurity: No Food Insecurity (1/7/2020)    Hunger Vital Sign     Worried About Running Out of Food in the Last Year: Never true     Ran Out of Food in the Last Year: Never true   Transportation Needs: No Transportation Needs (1/7/2020)    PRAPARE - Transportation     Lack of Transportation (Medical): No     Lack of Transportation (Non-Medical): No   Physical Activity: Sufficiently Active (1/7/2020)    Exercise Vital Sign     Days of Exercise per Week: 5 days     Minutes of Exercise per Session: 150+ min   Stress: Stress Concern Present (1/7/2020)    [place] [employer] - Occupational Stress Questionnaire     Feeling of Stress : To some extent   Social Connections: Unknown (1/7/2020)    Social Connection and Isolation Panel [NHANES]     Frequency of Social Gatherings with Friends and Family: Once a week     Active Member of Clubs or Organizations: No     Marital Status: Living with partner       Allergies as of 11/06/2023 - Reviewed 11/06/2023   Allergen Reaction Noted    Cephalosporins Hives 01/05/2006    Vitamin k analogues  07/10/2012    Duricef [cefadroxil] Hives 07/10/2012    Vitamin k  12/23/2005    Bactrim [sulfamethoxazole-trimethoprim] Nausea And Vomiting 07/10/2012       HOME MEDS     Current Outpatient Medications   Medication Instructions    citalopram (CELEXA) 40 mg, Oral, Daily    clonazePAM (KLONOPIN) 1 mg, Oral, 2 times daily PRN    DULoxetine (CYMBALTA) 20 mg, Oral, Daily    ergocalciferol (VITAMIN D2) 50,000 Units, Oral, Every 7 days    levonorgestreL (MIRENA) 21 mcg/24 hours (8 yrs) 52 mg IUD 1 each, Intrauterine, Once    MOUNJARO 2.5 mg, Subcutaneous, Every 7 days    MOUNJARO 5 mg, Subcutaneous, Every 7 days       The following were updated and reviewed by myself in the chart: medications, past medical history, past surgical history, family history, social history, and allergies.        Objective    OBJECTIVE   /70   Pulse 95   Temp 98.2 °F (36.8 °C)   Ht 5'  "3" (1.6 m)   Wt 74.8 kg (164 lb 14.5 oz)   LMP 11/03/2023   SpO2 98%   Breastfeeding No   BMI 29.21 kg/m²     Wt Readings from Last 2 Encounters:   11/06/23 74.8 kg (164 lb 14.5 oz)   10/04/23 77.4 kg (170 lb 10.2 oz)       BP Readings from Last 2 Encounters:   11/06/23 100/70   09/25/23 118/72          Physical Exam  Vitals and nursing note reviewed.   Constitutional:       Appearance: Normal appearance. She is obese.      Comments: Appears fatigue   HENT:      Head: Normocephalic and atraumatic.      Nose: Nose normal.   Eyes:      Extraocular Movements: Extraocular movements intact.      Conjunctiva/sclera: Conjunctivae normal.      Pupils: Pupils are equal, round, and reactive to light.   Cardiovascular:      Rate and Rhythm: Normal rate and regular rhythm.   Pulmonary:      Effort: Pulmonary effort is normal.      Breath sounds: Normal breath sounds.   Abdominal:      General: Abdomen is flat.      Palpations: Abdomen is soft.      Tenderness: There is no abdominal tenderness.      Comments: Increased abdominal girth   Musculoskeletal:         General: No swelling or tenderness. Normal range of motion.      Cervical back: Normal range of motion.   Lymphadenopathy:      Cervical: No cervical adenopathy.   Skin:     General: Skin is warm.      Findings: No lesion or rash.   Neurological:      General: No focal deficit present.      Mental Status: She is alert. Mental status is at baseline.   Psychiatric:         Mood and Affect: Mood normal.         Behavior: Behavior normal.               LABS      LAB RESULTS, IF AVAILABLE, ARE DISCUSSED WITH PATIENT AND POSTED TO PATIENT PORTAL     ASSESSMENT & PLAN     1. Type 2 diabetes mellitus with hyperglycemia, without long-term current use of insulin  Overview:  Likely steroid induced, with a history of gestational diabetes      2. Steroid-induced diabetes mellitus, subsequent encounter  -     tirzepatide (MOUNJARO) 5 mg/0.5 mL PnIj; Inject 5 mg into the skin every " "7 days.  Dispense: 4 Pen; Refill: 5    3. Vitamin D deficiency  -     ergocalciferol (VITAMIN D2) 50,000 unit Cap; Take 1 capsule (50,000 Units total) by mouth every 7 days.  Dispense: 4 capsule; Refill: 5      Patient is doing well currently, we will continue on Mounjaro.  Glucose is stable.  A1c is controlled.  No changes for now.    I spent a total of 40 minutes face to face and non-face to face on the date of this visit.This includes time preparing to see the patient (eg, review of tests, notes), obtaining and/or reviewing additional history from an independent historian and/or outside medical records, documenting clinical information in the electronic health record, independently interpreting results and/or communicating results to the patient/family/caregiver, or care coordinator.      Disclaimer: Portions of this record may have been created with voice recognition software. Occasional wrong-word or "sound-a-like" substitutions may have occurred due to inherent limitations of voice recognition software. Read the chart carefully and recognize, using context, where substitutions have occurred."    Signed by:  Eryn Gong MD           "

## 2023-11-16 ENCOUNTER — OFFICE VISIT (OUTPATIENT)
Dept: DERMATOLOGY | Facility: CLINIC | Age: 33
End: 2023-11-16
Payer: COMMERCIAL

## 2023-11-16 DIAGNOSIS — L65.9 HAIR LOSS: Primary | ICD-10-CM

## 2023-11-16 PROCEDURE — 99203 OFFICE O/P NEW LOW 30 MIN: CPT | Mod: S$GLB,,, | Performed by: DERMATOLOGY

## 2023-11-16 PROCEDURE — 1159F PR MEDICATION LIST DOCUMENTED IN MEDICAL RECORD: ICD-10-PCS | Mod: CPTII,S$GLB,, | Performed by: DERMATOLOGY

## 2023-11-16 PROCEDURE — 3044F HG A1C LEVEL LT 7.0%: CPT | Mod: CPTII,S$GLB,, | Performed by: DERMATOLOGY

## 2023-11-16 PROCEDURE — 99999 PR PBB SHADOW E&M-EST. PATIENT-LVL III: ICD-10-PCS | Mod: PBBFAC,,, | Performed by: DERMATOLOGY

## 2023-11-16 PROCEDURE — 99999 PR PBB SHADOW E&M-EST. PATIENT-LVL III: CPT | Mod: PBBFAC,,, | Performed by: DERMATOLOGY

## 2023-11-16 PROCEDURE — 3044F PR MOST RECENT HEMOGLOBIN A1C LEVEL <7.0%: ICD-10-PCS | Mod: CPTII,S$GLB,, | Performed by: DERMATOLOGY

## 2023-11-16 PROCEDURE — 1159F MED LIST DOCD IN RCRD: CPT | Mod: CPTII,S$GLB,, | Performed by: DERMATOLOGY

## 2023-11-16 PROCEDURE — 1160F RVW MEDS BY RX/DR IN RCRD: CPT | Mod: CPTII,S$GLB,, | Performed by: DERMATOLOGY

## 2023-11-16 PROCEDURE — 1160F PR REVIEW ALL MEDS BY PRESCRIBER/CLIN PHARMACIST DOCUMENTED: ICD-10-PCS | Mod: CPTII,S$GLB,, | Performed by: DERMATOLOGY

## 2023-11-16 PROCEDURE — 99203 PR OFFICE/OUTPT VISIT, NEW, LEVL III, 30-44 MIN: ICD-10-PCS | Mod: S$GLB,,, | Performed by: DERMATOLOGY

## 2023-11-16 RX ORDER — FLUOCINONIDE TOPICAL SOLUTION USP, 0.05% 0.5 MG/ML
SOLUTION TOPICAL
Qty: 60 ML | Refills: 5 | Status: SHIPPED | OUTPATIENT
Start: 2023-11-16 | End: 2023-11-16 | Stop reason: SDUPTHER

## 2023-11-16 RX ORDER — FLUOCINONIDE TOPICAL SOLUTION USP, 0.05% 0.5 MG/ML
SOLUTION TOPICAL
Qty: 60 ML | Refills: 5 | Status: SHIPPED | OUTPATIENT
Start: 2023-11-16

## 2023-11-16 NOTE — PROGRESS NOTES
Subjective:      Patient ID:  Terri Tavera is a 33 y.o. female who presents for   Chief Complaint   Patient presents with    Hair Loss     X 2-3 years ago. Increased after giving birth. Tx biotin. 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   Location: scalp  Duration: years, worsening since post-partum for twins  Signs/Symptoms: thinning    Prior treatments: none      TSH and CBC wnl 10/23/23        Review of Systems   Constitutional:  Negative for fever and chills.   Gastrointestinal:  Negative for nausea and vomiting.   Skin:  Positive for activity-related sunscreen use. Negative for daily sunscreen use and recent sunburn.   Hematologic/Lymphatic: Does not bruise/bleed easily.       Objective:   Physical Exam   Constitutional: She appears well-developed and well-nourished. No distress.   Neurological: She is alert and oriented to person, place, and time. She is not disoriented.   Psychiatric: She has a normal mood and affect.   Skin:   Areas Examined (abnormalities noted in diagram):   Scalp / Hair Palpated and Inspected  Head / Face Inspection Performed  Neck Inspection Performed  RUE Inspected  LUE Inspection Performed  Nails and Digits Inspection Performed              Assessment / Plan:        Hair loss  -     fluocinonide (LIDEX) 0.05 % external solution; Apply to scalp once daily  Dispense: 60 mL; Refill: 5  -     mild female pattern vs. Telogen effluvium.  Discussed minoxidil, pt defers due to potential to use long term to avoid shedding.  Will start above med and recommend OTC biotin. The patient acknowledged understanding.                Follow up in about 4 months (around 3/16/2024).

## 2023-12-08 ENCOUNTER — LAB SERVICES (OUTPATIENT)
Dept: LAB | Age: 33
End: 2023-12-08

## 2023-12-08 ENCOUNTER — OFFICE VISIT (OUTPATIENT)
Dept: INTERNAL MEDICINE | Age: 33
End: 2023-12-08

## 2023-12-08 VITALS
HEART RATE: 80 BPM | WEIGHT: 186 LBS | SYSTOLIC BLOOD PRESSURE: 124 MMHG | HEIGHT: 62 IN | BODY MASS INDEX: 34.23 KG/M2 | DIASTOLIC BLOOD PRESSURE: 80 MMHG | OXYGEN SATURATION: 99 %

## 2023-12-08 DIAGNOSIS — K21.9 GASTROESOPHAGEAL REFLUX DISEASE WITHOUT ESOPHAGITIS: ICD-10-CM

## 2023-12-08 DIAGNOSIS — Z00.00 ANNUAL PHYSICAL EXAM: ICD-10-CM

## 2023-12-08 DIAGNOSIS — K50.019 CROHN'S DISEASE OF SMALL INTESTINE WITH COMPLICATION (CMD): ICD-10-CM

## 2023-12-08 DIAGNOSIS — Z00.00 ANNUAL PHYSICAL EXAM: Primary | ICD-10-CM

## 2023-12-08 LAB
25(OH)D3+25(OH)D2 SERPL-MCNC: 14.4 NG/ML (ref 30–100)
ALBUMIN SERPL-MCNC: 4.2 G/DL (ref 3.6–5.1)
ALBUMIN/GLOB SERPL: 1.3 {RATIO} (ref 1–2.4)
ALP SERPL-CCNC: 65 UNITS/L (ref 45–117)
ALT SERPL-CCNC: 23 UNITS/L
ANION GAP SERPL CALC-SCNC: 10 MMOL/L (ref 7–19)
AST SERPL-CCNC: 10 UNITS/L
BASOPHILS # BLD: 0.1 K/MCL (ref 0–0.3)
BASOPHILS NFR BLD: 1 %
BILIRUB SERPL-MCNC: 0.5 MG/DL (ref 0.2–1)
BUN SERPL-MCNC: 12 MG/DL (ref 6–20)
BUN/CREAT SERPL: 17 (ref 7–25)
CALCIUM SERPL-MCNC: 9.2 MG/DL (ref 8.4–10.2)
CHLORIDE SERPL-SCNC: 108 MMOL/L (ref 97–110)
CO2 SERPL-SCNC: 23 MMOL/L (ref 21–32)
CREAT SERPL-MCNC: 0.71 MG/DL (ref 0.51–0.95)
DEPRECATED RDW RBC: 42.1 FL (ref 39–50)
EGFRCR SERPLBLD CKD-EPI 2021: >90 ML/MIN/{1.73_M2}
EOSINOPHIL # BLD: 0.1 K/MCL (ref 0–0.5)
EOSINOPHIL NFR BLD: 1 %
ERYTHROCYTE [DISTWIDTH] IN BLOOD: 12 % (ref 11–15)
FASTING DURATION TIME PATIENT: 12 HOURS (ref 0–999)
GLOBULIN SER-MCNC: 3.2 G/DL (ref 2–4)
GLUCOSE SERPL-MCNC: 95 MG/DL (ref 70–99)
HCT VFR BLD CALC: 40 % (ref 36–46.5)
HGB BLD-MCNC: 13.3 G/DL (ref 12–15.5)
IMM GRANULOCYTES # BLD AUTO: 0 K/MCL (ref 0–0.2)
IMM GRANULOCYTES # BLD: 0 %
LYMPHOCYTES # BLD: 2.4 K/MCL (ref 1–4.8)
LYMPHOCYTES NFR BLD: 30 %
MCH RBC QN AUTO: 31.5 PG (ref 26–34)
MCHC RBC AUTO-ENTMCNC: 33.3 G/DL (ref 32–36.5)
MCV RBC AUTO: 94.8 FL (ref 78–100)
MONOCYTES # BLD: 0.7 K/MCL (ref 0.3–0.9)
MONOCYTES NFR BLD: 9 %
NEUTROPHILS # BLD: 4.6 K/MCL (ref 1.8–7.7)
NEUTROPHILS NFR BLD: 59 %
NRBC BLD MANUAL-RTO: 0 /100 WBC
PLATELET # BLD AUTO: 243 K/MCL (ref 140–450)
POTASSIUM SERPL-SCNC: 4.2 MMOL/L (ref 3.4–5.1)
PROT SERPL-MCNC: 7.4 G/DL (ref 6.4–8.2)
RBC # BLD: 4.22 MIL/MCL (ref 4–5.2)
SODIUM SERPL-SCNC: 137 MMOL/L (ref 135–145)
TSH SERPL-ACNC: 0.61 MCUNITS/ML (ref 0.35–5)
WBC # BLD: 7.9 K/MCL (ref 4.2–11)

## 2023-12-08 PROCEDURE — 80050 GENERAL HEALTH PANEL: CPT | Performed by: CLINICAL MEDICAL LABORATORY

## 2023-12-08 PROCEDURE — 36415 COLL VENOUS BLD VENIPUNCTURE: CPT | Performed by: INTERNAL MEDICINE

## 2023-12-08 PROCEDURE — 82306 VITAMIN D 25 HYDROXY: CPT | Performed by: CLINICAL MEDICAL LABORATORY

## 2023-12-08 PROCEDURE — 99395 PREV VISIT EST AGE 18-39: CPT | Performed by: INTERNAL MEDICINE

## 2023-12-08 RX ORDER — PANTOPRAZOLE SODIUM 40 MG/1
40 TABLET, DELAYED RELEASE ORAL 2 TIMES DAILY
Qty: 60 TABLET | Refills: 3 | Status: SHIPPED | OUTPATIENT
Start: 2023-12-08

## 2023-12-08 RX ORDER — PANTOPRAZOLE SODIUM 40 MG/1
40 TABLET, DELAYED RELEASE ORAL 2 TIMES DAILY
Qty: 180 TABLET | OUTPATIENT
Start: 2023-12-08

## 2023-12-08 ASSESSMENT — PATIENT HEALTH QUESTIONNAIRE - PHQ9
1. LITTLE INTEREST OR PLEASURE IN DOING THINGS: NOT AT ALL
SUM OF ALL RESPONSES TO PHQ9 QUESTIONS 1 AND 2: 0
CLINICAL INTERPRETATION OF PHQ2 SCORE: NO FURTHER SCREENING NEEDED
SUM OF ALL RESPONSES TO PHQ9 QUESTIONS 1 AND 2: 0
2. FEELING DOWN, DEPRESSED OR HOPELESS: NOT AT ALL

## 2023-12-11 ENCOUNTER — TELEPHONE (OUTPATIENT)
Dept: INTERNAL MEDICINE | Age: 33
End: 2023-12-11

## 2024-01-09 ENCOUNTER — PATIENT MESSAGE (OUTPATIENT)
Dept: PRIMARY CARE CLINIC | Facility: CLINIC | Age: 34
End: 2024-01-09
Payer: COMMERCIAL

## 2024-01-25 ENCOUNTER — PATIENT MESSAGE (OUTPATIENT)
Dept: PRIMARY CARE CLINIC | Facility: CLINIC | Age: 34
End: 2024-01-25
Payer: COMMERCIAL

## 2024-01-25 DIAGNOSIS — R10.9 ABDOMINAL PAIN, UNSPECIFIED ABDOMINAL LOCATION: Primary | ICD-10-CM

## 2024-01-26 ENCOUNTER — LAB VISIT (OUTPATIENT)
Dept: LAB | Facility: HOSPITAL | Age: 34
End: 2024-01-26
Attending: FAMILY MEDICINE
Payer: COMMERCIAL

## 2024-01-26 DIAGNOSIS — R10.9 ABDOMINAL PAIN, UNSPECIFIED ABDOMINAL LOCATION: ICD-10-CM

## 2024-01-26 LAB
ALBUMIN SERPL BCP-MCNC: 3.8 G/DL (ref 3.5–5.2)
ALP SERPL-CCNC: 102 U/L (ref 55–135)
ALT SERPL W/O P-5'-P-CCNC: 98 U/L (ref 10–44)
AMYLASE SERPL-CCNC: 13 U/L (ref 20–110)
ANION GAP SERPL CALC-SCNC: 6 MMOL/L (ref 8–16)
AST SERPL-CCNC: 43 U/L (ref 10–40)
BASOPHILS # BLD AUTO: 0.03 K/UL (ref 0–0.2)
BASOPHILS NFR BLD: 0.6 % (ref 0–1.9)
BILIRUB SERPL-MCNC: 2.4 MG/DL (ref 0.1–1)
BUN SERPL-MCNC: 10 MG/DL (ref 6–20)
CALCIUM SERPL-MCNC: 9.1 MG/DL (ref 8.7–10.5)
CHLORIDE SERPL-SCNC: 105 MMOL/L (ref 95–110)
CO2 SERPL-SCNC: 27 MMOL/L (ref 23–29)
CREAT SERPL-MCNC: 0.8 MG/DL (ref 0.5–1.4)
DIFFERENTIAL METHOD BLD: NORMAL
EOSINOPHIL # BLD AUTO: 0.4 K/UL (ref 0–0.5)
EOSINOPHIL NFR BLD: 6.7 % (ref 0–8)
ERYTHROCYTE [DISTWIDTH] IN BLOOD BY AUTOMATED COUNT: 13.2 % (ref 11.5–14.5)
EST. GFR  (NO RACE VARIABLE): >60 ML/MIN/1.73 M^2
GLUCOSE SERPL-MCNC: 107 MG/DL (ref 70–110)
HCT VFR BLD AUTO: 43.1 % (ref 37–48.5)
HGB BLD-MCNC: 14.5 G/DL (ref 12–16)
IMM GRANULOCYTES # BLD AUTO: 0.01 K/UL (ref 0–0.04)
IMM GRANULOCYTES NFR BLD AUTO: 0.2 % (ref 0–0.5)
LIPASE SERPL-CCNC: 20 U/L (ref 4–60)
LYMPHOCYTES # BLD AUTO: 1.6 K/UL (ref 1–4.8)
LYMPHOCYTES NFR BLD: 30 % (ref 18–48)
MCH RBC QN AUTO: 29.5 PG (ref 27–31)
MCHC RBC AUTO-ENTMCNC: 33.6 G/DL (ref 32–36)
MCV RBC AUTO: 88 FL (ref 82–98)
MONOCYTES # BLD AUTO: 0.5 K/UL (ref 0.3–1)
MONOCYTES NFR BLD: 9.3 % (ref 4–15)
NEUTROPHILS # BLD AUTO: 2.9 K/UL (ref 1.8–7.7)
NEUTROPHILS NFR BLD: 53.2 % (ref 38–73)
NRBC BLD-RTO: 0 /100 WBC
PLATELET # BLD AUTO: 227 K/UL (ref 150–450)
PMV BLD AUTO: 10 FL (ref 9.2–12.9)
POTASSIUM SERPL-SCNC: 3.4 MMOL/L (ref 3.5–5.1)
PROT SERPL-MCNC: 6.8 G/DL (ref 6–8.4)
RBC # BLD AUTO: 4.91 M/UL (ref 4–5.4)
SODIUM SERPL-SCNC: 138 MMOL/L (ref 136–145)
WBC # BLD AUTO: 5.36 K/UL (ref 3.9–12.7)

## 2024-01-26 PROCEDURE — 80053 COMPREHEN METABOLIC PANEL: CPT | Performed by: FAMILY MEDICINE

## 2024-01-26 PROCEDURE — 36415 COLL VENOUS BLD VENIPUNCTURE: CPT | Mod: PO | Performed by: FAMILY MEDICINE

## 2024-01-26 PROCEDURE — 83690 ASSAY OF LIPASE: CPT | Performed by: FAMILY MEDICINE

## 2024-01-26 PROCEDURE — 85025 COMPLETE CBC W/AUTO DIFF WBC: CPT | Performed by: FAMILY MEDICINE

## 2024-01-26 PROCEDURE — 82150 ASSAY OF AMYLASE: CPT | Performed by: FAMILY MEDICINE

## 2024-02-05 ENCOUNTER — OFFICE VISIT (OUTPATIENT)
Dept: GASTROENTEROLOGY | Facility: CLINIC | Age: 34
End: 2024-02-05
Payer: COMMERCIAL

## 2024-02-05 ENCOUNTER — LAB VISIT (OUTPATIENT)
Dept: LAB | Facility: HOSPITAL | Age: 34
End: 2024-02-05
Attending: NURSE PRACTITIONER
Payer: COMMERCIAL

## 2024-02-05 VITALS
BODY MASS INDEX: 27.7 KG/M2 | HEART RATE: 94 BPM | WEIGHT: 156.31 LBS | SYSTOLIC BLOOD PRESSURE: 108 MMHG | DIASTOLIC BLOOD PRESSURE: 73 MMHG | HEIGHT: 63 IN

## 2024-02-05 DIAGNOSIS — R74.8 ELEVATED LIVER ENZYMES: ICD-10-CM

## 2024-02-05 DIAGNOSIS — R11.2 NAUSEA AND VOMITING, UNSPECIFIED VOMITING TYPE: ICD-10-CM

## 2024-02-05 DIAGNOSIS — R10.9 RIGHT SIDED ABDOMINAL PAIN: Primary | ICD-10-CM

## 2024-02-05 DIAGNOSIS — R10.9 RIGHT SIDED ABDOMINAL PAIN: ICD-10-CM

## 2024-02-05 LAB
ALBUMIN SERPL BCP-MCNC: 4 G/DL (ref 3.5–5.2)
ALP SERPL-CCNC: 101 U/L (ref 55–135)
ALT SERPL W/O P-5'-P-CCNC: 45 U/L (ref 10–44)
ANION GAP SERPL CALC-SCNC: 10 MMOL/L (ref 8–16)
AST SERPL-CCNC: 31 U/L (ref 10–40)
BASOPHILS # BLD AUTO: 0.04 K/UL (ref 0–0.2)
BASOPHILS NFR BLD: 0.5 % (ref 0–1.9)
BILIRUB DIRECT SERPL-MCNC: 0.5 MG/DL (ref 0.1–0.3)
BILIRUB SERPL-MCNC: 1.6 MG/DL (ref 0.1–1)
BUN SERPL-MCNC: 13 MG/DL (ref 6–20)
CALCIUM SERPL-MCNC: 9.3 MG/DL (ref 8.7–10.5)
CHLORIDE SERPL-SCNC: 105 MMOL/L (ref 95–110)
CO2 SERPL-SCNC: 24 MMOL/L (ref 23–29)
CREAT SERPL-MCNC: 0.7 MG/DL (ref 0.5–1.4)
DIFFERENTIAL METHOD BLD: NORMAL
EOSINOPHIL # BLD AUTO: 0.2 K/UL (ref 0–0.5)
EOSINOPHIL NFR BLD: 2.4 % (ref 0–8)
ERYTHROCYTE [DISTWIDTH] IN BLOOD BY AUTOMATED COUNT: 13.3 % (ref 11.5–14.5)
EST. GFR  (NO RACE VARIABLE): >60 ML/MIN/1.73 M^2
GLUCOSE SERPL-MCNC: 102 MG/DL (ref 70–110)
HCT VFR BLD AUTO: 41.4 % (ref 37–48.5)
HGB BLD-MCNC: 14 G/DL (ref 12–16)
IMM GRANULOCYTES # BLD AUTO: 0.02 K/UL (ref 0–0.04)
IMM GRANULOCYTES NFR BLD AUTO: 0.3 % (ref 0–0.5)
LYMPHOCYTES # BLD AUTO: 2 K/UL (ref 1–4.8)
LYMPHOCYTES NFR BLD: 26.4 % (ref 18–48)
MCH RBC QN AUTO: 29.5 PG (ref 27–31)
MCHC RBC AUTO-ENTMCNC: 33.8 G/DL (ref 32–36)
MCV RBC AUTO: 87 FL (ref 82–98)
MONOCYTES # BLD AUTO: 0.5 K/UL (ref 0.3–1)
MONOCYTES NFR BLD: 6.3 % (ref 4–15)
NEUTROPHILS # BLD AUTO: 4.8 K/UL (ref 1.8–7.7)
NEUTROPHILS NFR BLD: 64.1 % (ref 38–73)
NRBC BLD-RTO: 0 /100 WBC
PLATELET # BLD AUTO: 249 K/UL (ref 150–450)
PMV BLD AUTO: 10 FL (ref 9.2–12.9)
POTASSIUM SERPL-SCNC: 4.3 MMOL/L (ref 3.5–5.1)
PROT SERPL-MCNC: 6.8 G/DL (ref 6–8.4)
RBC # BLD AUTO: 4.74 M/UL (ref 4–5.4)
SODIUM SERPL-SCNC: 139 MMOL/L (ref 136–145)
WBC # BLD AUTO: 7.49 K/UL (ref 3.9–12.7)

## 2024-02-05 PROCEDURE — 99204 OFFICE O/P NEW MOD 45 MIN: CPT | Mod: S$GLB,,, | Performed by: NURSE PRACTITIONER

## 2024-02-05 PROCEDURE — 3074F SYST BP LT 130 MM HG: CPT | Mod: CPTII,S$GLB,, | Performed by: NURSE PRACTITIONER

## 2024-02-05 PROCEDURE — 99999 PR PBB SHADOW E&M-EST. PATIENT-LVL IV: CPT | Mod: PBBFAC,,, | Performed by: NURSE PRACTITIONER

## 2024-02-05 PROCEDURE — 80074 ACUTE HEPATITIS PANEL: CPT | Performed by: NURSE PRACTITIONER

## 2024-02-05 PROCEDURE — 85025 COMPLETE CBC W/AUTO DIFF WBC: CPT | Performed by: NURSE PRACTITIONER

## 2024-02-05 PROCEDURE — 80076 HEPATIC FUNCTION PANEL: CPT | Performed by: NURSE PRACTITIONER

## 2024-02-05 PROCEDURE — 1159F MED LIST DOCD IN RCRD: CPT | Mod: CPTII,S$GLB,, | Performed by: NURSE PRACTITIONER

## 2024-02-05 PROCEDURE — 36415 COLL VENOUS BLD VENIPUNCTURE: CPT | Performed by: NURSE PRACTITIONER

## 2024-02-05 PROCEDURE — 1160F RVW MEDS BY RX/DR IN RCRD: CPT | Mod: CPTII,S$GLB,, | Performed by: NURSE PRACTITIONER

## 2024-02-05 PROCEDURE — 80048 BASIC METABOLIC PNL TOTAL CA: CPT | Performed by: NURSE PRACTITIONER

## 2024-02-05 PROCEDURE — 3008F BODY MASS INDEX DOCD: CPT | Mod: CPTII,S$GLB,, | Performed by: NURSE PRACTITIONER

## 2024-02-05 PROCEDURE — 3078F DIAST BP <80 MM HG: CPT | Mod: CPTII,S$GLB,, | Performed by: NURSE PRACTITIONER

## 2024-02-05 NOTE — PROGRESS NOTES
Clinic Consult:  [facility] Consultation Note    Reason for Consult:  The primary encounter diagnosis was Right sided abdominal pain. Diagnoses of Nausea and vomiting, unspecified vomiting type and Elevated liver enzymes were also pertinent to this visit.    PCP: Eryn Gong / Alex [place]IR [id number]    HPI:  This is a 33 y.o. female here for evaluation of     Started Mounjaro in 2023.   Started with abdominal pain right side (ache), diarrhea, nausea, and vomiting around [place]. Symptoms persisted so she stopped Mounjaro about 4 weeks ago. She had improvement in pain about 1 week ago.   She had labs drawn with PCP with elevated liver enzymes. Pancreatic enzymes normal.   No risk for viral hepatitis.     Review of Systems   Constitutional:  Negative for fever and weight loss.   HENT:  Negative for sore throat.    Respiratory:  Negative for cough, shortness of breath and wheezing.    Cardiovascular:  Negative for chest pain and palpitations.   Gastrointestinal:  Positive for abdominal pain, diarrhea, nausea and vomiting. Negative for blood in stool, constipation, heartburn and melena.   Genitourinary:  Negative for dysuria and frequency.   Skin:  Negative for itching and rash.   Neurological:  Negative for dizziness, speech change, seizures, loss of consciousness and headaches.       Medical History:  has a past medical history of ALL (acute lymphoblastic leukemia), Allergy, Anxiety, Depression, Encounter for blood transfusion, Fever blister, History of cholecystectomy, Hypogammaglobulinaemia, unspecified, and Irritable bowel syndrome.    Surgical History:  has a past surgical history that includes Cholecystectomy (2011); Incision and drainage of wound; Bone marrow biopsy; Intrathecal pump implantation; Central port and subsequent removal; Tonsillectomy; Abdominal surgery (2011);  section (2023); Multiple tooth extractions; and impant screws.    Family  History: family history includes Arthritis in her father and maternal grandmother; Breast cancer in her paternal aunt; Cancer in her maternal grandfather, mother, and paternal grandmother; Cataracts in her father; Depression in her father; Diabetes in her father and maternal grandmother; Hashimoto's thyroiditis in her mother; Heart disease in her father and paternal grandfather; Hyperlipidemia in her father and mother; Thyroid disease in her mother; Ulcerative colitis in her father, paternal aunt, and paternal grandmother..     Social History:  reports that she has never smoked. She has never used smokeless tobacco. She reports current alcohol use of about 2.0 standard drinks of alcohol per week. She reports that she does not use drugs.    Allergies: Reviewed    Home Medications:   Current Outpatient Medications on File Prior to Visit   Medication Sig Dispense Refill    citalopram (CELEXA) 40 MG tablet Take 40 mg by mouth once daily.      clonazePAM (KLONOPIN) 1 MG tablet Take 1 mg by mouth 2 (two) times daily as needed for Anxiety.      DULoxetine (CYMBALTA) 20 MG capsule Take 20 mg by mouth once daily.      ergocalciferol (VITAMIN D2) 50,000 unit Cap Take 1 capsule (50,000 Units total) by mouth every 7 days. 4 capsule 5    fluocinonide (LIDEX) 0.05 % external solution Apply to scalp once daily 60 mL 5    levonorgestreL (MIRENA) 21 mcg/24 hours (8 yrs) 52 mg IUD 1 each by Intrauterine route once.      methocarbamoL (ROBAXIN) 500 MG Tab Take 1 tablet (500 mg total) by mouth 4 (four) times daily as needed (pain). 30 tablet 3    tirzepatide (MOUNJARO) 2.5 mg/0.5 mL PnIj Inject 2.5 mg into the skin every 7 days. (Patient not taking: Reported on 2/5/2024) 4 pen 5    tirzepatide (MOUNJARO) 5 mg/0.5 mL PnIj Inject 5 mg into the skin every 7 days. (Patient not taking: Reported on 2/5/2024) 4 Pen 5     No current facility-administered medications on file prior to visit.       Physical Exam:  /73 (BP Location: Left  "arm, Patient Position: Sitting, BP Method: Medium (Automatic))   Pulse 94   Ht 5' 3" (1.6 m)   Wt 70.9 kg (156 lb 4.9 oz)   BMI 27.69 kg/m²   Body mass index is 27.69 kg/m².  Physical Exam  Constitutional:       General: She is not in acute distress.  HENT:      Head: Normocephalic.   Neurological:      General: No focal deficit present.      Mental Status: She is alert.   Psychiatric:         Mood and Affect: Mood normal.         Judgment: Judgment normal.         Labs: Pertinent labs reviewed.  CRC Screening:     Assessment:  1. Right sided abdominal pain    2. Nausea and vomiting, unspecified vomiting type    3. Elevated liver enzymes    Symptoms at first thought to be SE of Mounjaro but would not have elevated LFTs. Other considerations include pancreatitis but lipase was normal. Also consideration of gallstone production but does not have gallbladder. She could very well have had CBD stone that caused obstruction and resolved which improved her symptoms. This would cause RUQ pain as well as elevated LFTs.     Recommendations:   - recheck labs  - check US   - while mounjaro can cause pancreatitis and gallstones, I do not have data for CBD stone with mounjaro.     Right sided abdominal pain  -     Ambulatory referral/consult to Gastroenterology  -     US Abdomen Limited; Future; Expected date: 02/05/2024  -     CBC Auto Differential; Future; Expected date: 02/05/2024  -     Hepatic Function Panel; Future; Expected date: 02/05/2024  -     Basic Metabolic Panel; Future; Expected date: 02/05/2024  -     Hepatitis Panel, Acute; Future; Expected date: 02/05/2024    Nausea and vomiting, unspecified vomiting type  -     US Abdomen Limited; Future; Expected date: 02/05/2024  -     CBC Auto Differential; Future; Expected date: 02/05/2024  -     Hepatic Function Panel; Future; Expected date: 02/05/2024  -     Basic Metabolic Panel; Future; Expected date: 02/05/2024  -     Hepatitis Panel, Acute; Future; Expected date: " 02/05/2024    Elevated liver enzymes  -     US Abdomen Limited; Future; Expected date: 02/05/2024  -     CBC Auto Differential; Future; Expected date: 02/05/2024  -     Hepatic Function Panel; Future; Expected date: 02/05/2024  -     Basic Metabolic Panel; Future; Expected date: 02/05/2024  -     Hepatitis Panel, Acute; Future; Expected date: 02/05/2024    Follow up to be determined after results/ procedure(s).    Thank you so much for allowing me to participate in the care of BRADFORD Bee

## 2024-02-06 DIAGNOSIS — R74.8 ELEVATED LIVER ENZYMES: Primary | ICD-10-CM

## 2024-02-06 LAB
HAV IGM SERPL QL IA: NORMAL
HBV CORE IGM SERPL QL IA: NORMAL
HBV SURFACE AG SERPL QL IA: NORMAL
HCV AB SERPL QL IA: NORMAL

## 2024-02-16 ENCOUNTER — HOSPITAL ENCOUNTER (OUTPATIENT)
Dept: RADIOLOGY | Facility: HOSPITAL | Age: 34
Discharge: HOME | End: 2024-02-16
Attending: NURSE PRACTITIONER
Payer: COMMERCIAL

## 2024-02-16 ENCOUNTER — PATIENT MESSAGE (OUTPATIENT)
Dept: GASTROENTEROLOGY | Facility: CLINIC | Age: 34
End: 2024-02-16
Payer: COMMERCIAL

## 2024-02-16 DIAGNOSIS — R11.2 NAUSEA AND VOMITING, UNSPECIFIED VOMITING TYPE: ICD-10-CM

## 2024-02-16 DIAGNOSIS — R74.8 ELEVATED LIVER ENZYMES: ICD-10-CM

## 2024-02-16 DIAGNOSIS — R10.9 RIGHT SIDED ABDOMINAL PAIN: ICD-10-CM

## 2024-02-16 DIAGNOSIS — R10.9 RIGHT SIDED ABDOMINAL PAIN: Primary | ICD-10-CM

## 2024-02-16 PROCEDURE — 76705 ECHO EXAM OF ABDOMEN: CPT | Mod: 26,,, | Performed by: RADIOLOGY

## 2024-02-16 PROCEDURE — 76705 ECHO EXAM OF ABDOMEN: CPT | Mod: TC

## 2024-02-16 RX ORDER — DICYCLOMINE HYDROCHLORIDE 20 MG/1
20 TABLET ORAL 3 TIMES DAILY PRN
Qty: 90 TABLET | Refills: 0 | Status: SHIPPED | OUTPATIENT
Start: 2024-02-16 | End: 2024-06-03

## 2024-02-26 ENCOUNTER — PATIENT MESSAGE (OUTPATIENT)
Dept: GASTROENTEROLOGY | Facility: CLINIC | Age: 34
End: 2024-02-26
Payer: COMMERCIAL

## 2024-02-28 ENCOUNTER — PATIENT MESSAGE (OUTPATIENT)
Dept: PRIMARY CARE CLINIC | Facility: CLINIC | Age: 34
End: 2024-02-28
Payer: COMMERCIAL

## 2024-03-12 ENCOUNTER — PATIENT OUTREACH (OUTPATIENT)
Dept: ADMINISTRATIVE | Facility: HOSPITAL | Age: 34
End: 2024-03-12
Payer: COMMERCIAL

## 2024-03-12 NOTE — PROGRESS NOTES
Eye Exam: Tried calling patient to find out about recent eye exam. No answer. Left voicemail. Care everywhere updated.

## 2024-03-20 ENCOUNTER — OFFICE VISIT (OUTPATIENT)
Dept: PODIATRY | Facility: CLINIC | Age: 34
End: 2024-03-20
Payer: COMMERCIAL

## 2024-03-20 VITALS — HEIGHT: 63 IN | WEIGHT: 156.31 LBS | BODY MASS INDEX: 27.7 KG/M2

## 2024-03-20 DIAGNOSIS — L02.611 ABSCESS OR CELLULITIS OF TOE, RIGHT: ICD-10-CM

## 2024-03-20 DIAGNOSIS — L03.031 PARONYCHIA OF TOE OF RIGHT FOOT: Primary | ICD-10-CM

## 2024-03-20 DIAGNOSIS — L03.031 ABSCESS OR CELLULITIS OF TOE, RIGHT: ICD-10-CM

## 2024-03-20 PROCEDURE — 99999 PR PBB SHADOW E&M-EST. PATIENT-LVL III: CPT | Mod: PBBFAC,,, | Performed by: PODIATRIST

## 2024-03-20 PROCEDURE — 87075 CULTR BACTERIA EXCEPT BLOOD: CPT | Performed by: PODIATRIST

## 2024-03-20 PROCEDURE — 99214 OFFICE O/P EST MOD 30 MIN: CPT | Mod: S$GLB,,, | Performed by: PODIATRIST

## 2024-03-20 PROCEDURE — 3008F BODY MASS INDEX DOCD: CPT | Mod: CPTII,S$GLB,, | Performed by: PODIATRIST

## 2024-03-20 PROCEDURE — 87076 CULTURE ANAEROBE IDENT EACH: CPT | Performed by: PODIATRIST

## 2024-03-20 PROCEDURE — 87070 CULTURE OTHR SPECIMN AEROBIC: CPT | Performed by: PODIATRIST

## 2024-03-20 PROCEDURE — 1159F MED LIST DOCD IN RCRD: CPT | Mod: CPTII,S$GLB,, | Performed by: PODIATRIST

## 2024-03-20 NOTE — PROGRESS NOTES
PODIATRIC MEDICINE AND SURGERY   3/21/2024    Reason for Visit   Chief Complaint   Patient presents with    Ingrown Toenail     C/o ingrown toenail, right great toenail, medial border, 0 pain, pain can get up to 6/10, x 1.5 weeks, prev perm procedure, diabetic, wears sandals       HPI  This is Terri Tavera is a 34 y.o. female who presents today complaining of painful right great toenail.  Patient states that she did pick at border of right great toenail medial aspect and noticed swelling and drainage.  Pain is 6/10.  She has not performed any treatment.  She has no further pedal complaints.    Hemoglobin A1C   Date Value Ref Range Status   10/23/2023 5.0 4.0 - 5.6 % Final     Comment:     ADA Screening Guidelines:  5.7-6.4%  Consistent with prediabetes  >or=6.5%  Consistent with diabetes    High levels of fetal hemoglobin interfere with the HbA1C  assay. Heterozygous hemoglobin variants (HbS, HgC, etc)do  not significantly interfere with this assay.   However, presence of multiple variants may affect accuracy.            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) 09/25/2023    Type 2 diabetes mellitus with hyperglycemia, without long-term current use of insulin 09/25/2023    BMI 30.0-30.9,adult 09/25/2023    Anxiety and depression 08/31/2012     Past Medical History:   Diagnosis Date    ALL (acute lymphoblastic leukemia)     Allergy     Anxiety     Depression     Encounter for blood transfusion     Fever blister     History of cholecystectomy     Hypogammaglobulinaemia, unspecified     Irritable bowel syndrome        MEDS  Current Outpatient Medications on File Prior to Visit   Medication Sig Dispense Refill    citalopram (CELEXA) 40 MG tablet Take 40 mg by mouth once daily.      clonazePAM (KLONOPIN) 1 MG tablet Take 1 mg by mouth 2 (two) times daily as needed for Anxiety.       dicyclomine (BENTYL) 20 mg tablet Take 1 tablet (20 mg total) by mouth 3 (three) times daily as needed (abdominal pain). 90 tablet 0    DULoxetine (CYMBALTA) 20 MG capsule Take 20 mg by mouth once daily.      ergocalciferol (VITAMIN D2) 50,000 unit Cap Take 1 capsule (50,000 Units total) by mouth every 7 days. 4 capsule 5    fluocinonide (LIDEX) 0.05 % external solution Apply to scalp once daily 60 mL 5    levonorgestreL (MIRENA) 21 mcg/24 hours (8 yrs) 52 mg IUD 1 each by Intrauterine route once.      methocarbamoL (ROBAXIN) 500 MG Tab Take 1 tablet (500 mg total) by mouth 4 (four) times daily as needed (pain). 30 tablet 3    tirzepatide (MOUNJARO) 2.5 mg/0.5 mL PnIj Inject 2.5 mg into the skin every 7 days. 4 pen 5    tirzepatide (MOUNJARO) 5 mg/0.5 mL PnIj Inject 5 mg into the skin every 7 days. 4 Pen 5     No current facility-administered medications on file prior to visit.       [facility]     Past Surgical History:   Procedure Laterality Date    ABDOMINAL SURGERY  2011    Cholecystectomy     BONE MARROW BIOPSY      Central port and subsequent removal       SECTION  2023    CHOLECYSTECTOMY  2011    lap    impant screws      INCISION AND DRAINAGE OF WOUND      [employer]    INTRATHECAL PUMP IMPLANTATION      for chemo    MULTIPLE TOOTH EXTRACTIONS      TONSILLECTOMY          ALL  Review of patient's allergies indicates:   Allergen Reactions    Cephalosporins Hives     duracef only    Vitamin k analogues     Duricef [cefadroxil] Hives     Hives and fever    Vitamin k      Other reaction(s): Discolored leg skin    Bactrim [sulfamethoxazole-trimethoprim] Nausea And Vomiting       SOC     Social History     Tobacco Use    Smoking status: Never    Smokeless tobacco: Never   Substance Use Topics    Alcohol use: Yes     Alcohol/week: 2.0 standard drinks of alcohol     Types: 2 Glasses of wine per week     Comment: twice/week    Drug use: Never       Family HX    Family History   Problem Relation Age of  "Onset    Ulcerative colitis Father     Heart disease Father     Diabetes Father     Hyperlipidemia Father     Cataracts Father     Arthritis Father     Depression Father     Hyperlipidemia Mother     Hashimoto's thyroiditis Mother     Cancer Mother         Gallbladder cancer    Thyroid disease Mother         Hashinotos    Ulcerative colitis Paternal Aunt     Breast cancer Paternal Aunt     Diabetes Maternal Grandmother         Parkinsons    Arthritis Maternal Grandmother     Cancer Maternal Grandfather         lung cancer     Ulcerative colitis Paternal Grandmother     Cancer Paternal Grandmother         esophageal cancer     Heart disease Paternal Grandfather     Celiac disease Neg Hx     Crohn's disease Neg Hx     Ovarian cancer Neg Hx     Colon cancer Neg Hx     Melanoma Neg Hx     Amblyopia Neg Hx     Blindness Neg Hx     Glaucoma Neg Hx     Macular degeneration Neg Hx     Retinal detachment Neg Hx     Strabismus Neg Hx           REVIEW OF SYSTEMS   General: This patient is well-developed, well-nourished and appears stated age, well-oriented to person, place and time, and cooperative and pleasant on today's visit  Constitutional: Negative for chills and fever.   Respiratory: Negative for shortness of breath.    Cardiovascular: Negative for chest pain, palpitations, orthopnea  Gastrointestinal: Negative for diarrhea, nausea and vomiting.   Musculoskeletal: Positive for above noted in HPI  Skin: Positive for nail changes   Peripheral Vascular: no claudication or cyanosis  Psychiatric/Behavioral: Negative for altered mental status       Vitals:    03/20/24 1337   Weight: 70.9 kg (156 lb 4.9 oz)   Height: 5' 3" (1.6 m)   PainSc:   6       LOWER EXTREMITY PHYSICAL EXAM    VASCULAR  Dorsalis pedis and posterior tibial pulses palpable 2/4 bilaterally.   Capillary refill time immediate to the toes.   Feet are warm to the touch. Skin temperature warm to warm from proximally to distally   There are no ecchymoses noted " to bilateral foot and ankle regions.   There is no  edema noted to foot     DERMATOLOGIC  Skin moist with healthy texture and turgor.  Proximal nail fold right hallux  There are no open ulcerations, lacerations, or fissures to bilateral foot and ankle regions.   There are no hyperkeratotic lesions noted to feet. Nails are well-trimmed.    NEUROLOGIC  Epicritic sensation is intact as the patient is able to sense light touch to bilateral foot and ankle regions.   Achilles and patellar deep tendon reflexes intact  Babinski reflex absent    ORTHOPEDIC/BIOMECHANICAL  Tenderness to palpation proximal nail fold right hallux  Muscle strength AT/EHL/EDL/PT: 5/5; Achilles/Gastroc/Soleus: 5/5; PB/PL: 5/5 Muscle tone is normal.  Ankle joint ROM INTACT DF/PF, non-crepitus    ASSESSMENT  Encounter Diagnoses   Name Primary?    Paronychia of toe of right foot Yes    Abscess or cellulitis of toe, right          PLAN    -Discussed presenting problems, etiology, pathologic processes and management options with patient today.     Paronychia of toe of right foot  -     Aerobic culture  -     Culture, Anaerobic    Abscess or cellulitis of toe, right  -     Aerobic culture  -     Culture, Anaerobic       -Patient received instructions for post op care, pain management with OTC analgesics, and soaking.  -If any signs of infection--increased redness, purulent drainage, increased pain then patient is advised to return to clinic or ER if after clinic hours. Advised to limit activities.  -RTC in 2 weeks for f/u visit, or sooner if any signs of infection noted.    Future Appointments   Date Time Provider Department Center   5/29/2024  1:00 PM Gayatri Iniguez MD [facility]   6/10/2024 11:30 AM Eryn Gong MD [facility]       Report Electronically Signed By:     Carolynn Bajwa DPM   Podiatric Medicine & Surgery  [facility]  3/21/2024

## 2024-03-21 ENCOUNTER — PATIENT MESSAGE (OUTPATIENT)
Dept: PODIATRY | Facility: CLINIC | Age: 34
End: 2024-03-21
Payer: COMMERCIAL

## 2024-03-21 RX ORDER — MUPIROCIN 20 MG/G
OINTMENT TOPICAL DAILY
Qty: 15 G | Refills: 1 | Status: SHIPPED | OUTPATIENT
Start: 2024-03-21 | End: 2024-06-03

## 2024-03-21 RX ORDER — DOXYCYCLINE 100 MG/1
100 CAPSULE ORAL 2 TIMES DAILY
Qty: 20 CAPSULE | Refills: 0 | Status: SHIPPED | OUTPATIENT
Start: 2024-03-21 | End: 2024-03-31

## 2024-03-23 LAB — BACTERIA SPEC AEROBE CULT: NORMAL

## 2024-03-26 LAB — BACTERIA SPEC ANAEROBE CULT: NORMAL

## 2024-04-05 ENCOUNTER — OFFICE VISIT (OUTPATIENT)
Dept: PODIATRY | Facility: CLINIC | Age: 34
End: 2024-04-05
Payer: COMMERCIAL

## 2024-04-05 VITALS — BODY MASS INDEX: 27.7 KG/M2 | WEIGHT: 156.31 LBS | HEIGHT: 63 IN

## 2024-04-05 DIAGNOSIS — L02.611 ABSCESS OR CELLULITIS OF TOE, RIGHT: Primary | ICD-10-CM

## 2024-04-05 DIAGNOSIS — L03.031 ABSCESS OR CELLULITIS OF TOE, RIGHT: Primary | ICD-10-CM

## 2024-04-05 PROCEDURE — 99999 PR PBB SHADOW E&M-EST. PATIENT-LVL III: CPT | Mod: PBBFAC,,, | Performed by: PODIATRIST

## 2024-04-05 PROCEDURE — 1160F RVW MEDS BY RX/DR IN RCRD: CPT | Mod: CPTII,S$GLB,, | Performed by: PODIATRIST

## 2024-04-05 PROCEDURE — 3008F BODY MASS INDEX DOCD: CPT | Mod: CPTII,S$GLB,, | Performed by: PODIATRIST

## 2024-04-05 PROCEDURE — 1159F MED LIST DOCD IN RCRD: CPT | Mod: CPTII,S$GLB,, | Performed by: PODIATRIST

## 2024-04-05 PROCEDURE — 99213 OFFICE O/P EST LOW 20 MIN: CPT | Mod: S$GLB,,, | Performed by: PODIATRIST

## 2024-04-05 RX ORDER — CLINDAMYCIN HYDROCHLORIDE 300 MG/1
300 CAPSULE ORAL EVERY 8 HOURS
Qty: 21 CAPSULE | Refills: 0 | Status: SHIPPED | OUTPATIENT
Start: 2024-04-05 | End: 2024-04-12

## 2024-04-05 NOTE — PROGRESS NOTES
Subjective:       Patient ID: Terri Tavera is a 34 y.o. female.    Chief Complaint: Follow-up (Coming in for a right great toe ingrown removal f/u, pt rates pain 3/10, pt states when walking it hurts at the joints, pt is non-diabetic, pt lat seen pcp Eryn Gong MD)    HPI: Terri Tavera presents to the office today, with area of purulence and drainage to the medial aspect of the right great toe.  Patient does have history of nail avulsion with permanent matricectomy to the medial border.  States that a couple weeks ago, she noted a new area formation medial to the medial nail fold.  States causing some moderate discomfort.  States 3/10 pain.  Recently completed doxycycline and continues to have issues.    Review of patient's allergies indicates:   Allergen Reactions    Cephalosporins Hives     duracef only    Vitamin k analogues     Duricef [cefadroxil] Hives     Hives and fever    Vitamin k      Other reaction(s): Discolored leg skin    Bactrim [sulfamethoxazole-trimethoprim] Nausea And Vomiting       Past Medical History:   Diagnosis Date    ALL (acute lymphoblastic leukemia)     Allergy     Anxiety     Depression     Encounter for blood transfusion     Fever blister     History of cholecystectomy     Hypogammaglobulinaemia, unspecified     Irritable bowel syndrome        Family History   Problem Relation Age of Onset    Ulcerative colitis Father     Heart disease Father     Diabetes Father     Hyperlipidemia Father     Cataracts Father     Arthritis Father     Depression Father     Hyperlipidemia Mother     Hashimoto's thyroiditis Mother     Cancer Mother         Gallbladder cancer    Thyroid disease Mother         Hashinotos    Ulcerative colitis Paternal Aunt     Breast cancer Paternal Aunt     Diabetes Maternal Grandmother         Parkinsons    Arthritis Maternal Grandmother     Cancer Maternal Grandfather         lung cancer     Ulcerative colitis Paternal Grandmother     Cancer Paternal  Grandmother         esophageal cancer     Heart disease Paternal Grandfather     Celiac disease Neg Hx     Crohn's disease Neg Hx     Ovarian cancer Neg Hx     Colon cancer Neg Hx     Melanoma Neg Hx     Amblyopia Neg Hx     Blindness Neg Hx     Glaucoma Neg Hx     Macular degeneration Neg Hx     Retinal detachment Neg Hx     Strabismus Neg Hx        Social History     Socioeconomic History    Marital status:    Tobacco Use    Smoking status: Never    Smokeless tobacco: Never   Substance and Sexual Activity    Alcohol use: Yes     Alcohol/week: 2.0 standard drinks of alcohol     Types: 2 Glasses of wine per week     Comment: twice/week    Drug use: Never    Sexual activity: Yes     Partners: Male     Birth control/protection: I.U.D.     Comment: IUD put back in 03/2023     Social Determinants of Health     Financial Resource Strain: Medium Risk (1/7/2020)    Overall Financial Resource Strain (CARDIA)     Difficulty of Paying Living Expenses: Somewhat hard   Food Insecurity: No Food Insecurity (1/7/2020)    Hunger Vital Sign     Worried About Running Out of Food in the Last Year: Never true     Ran Out of Food in the Last Year: Never true   Transportation Needs: No Transportation Needs (1/7/2020)    PRAPARE - Transportation     Lack of Transportation (Medical): No     Lack of Transportation (Non-Medical): No   Physical Activity: Sufficiently Active (1/7/2020)    Exercise Vital Sign     Days of Exercise per Week: 5 days     Minutes of Exercise per Session: 150+ min   Stress: Stress Concern Present (1/7/2020)    [employer] [employer] - Occupational Stress Questionnaire     Feeling of Stress : To some extent   Social Connections: Unknown (1/7/2020)    Social Connection and Isolation Panel [NHANES]     Frequency of Social Gatherings with Friends and Family: Once a week     Active Member of Clubs or Organizations: No     Marital Status: Living with partner       Past Surgical History:   Procedure  "Laterality Date    ABDOMINAL SURGERY  2011    Cholecystectomy 2011    BONE MARROW BIOPSY      Central port and subsequent removal       SECTION  2023    CHOLECYSTECTOMY  2011    lap    impant screws      INCISION AND DRAINAGE OF WOUND      [employer]    INTRATHECAL PUMP IMPLANTATION      for chemo    MULTIPLE TOOTH EXTRACTIONS      TONSILLECTOMY         Review of Systems       Objective:   Ht 5' 3" (1.6 m)   Wt 70.9 kg (156 lb 4.9 oz)   BMI 27.69 kg/m²     US Abdomen Limited  Narrative: EXAM: US ABDOMEN LIMITED    CLINICAL HISTORY: Abdomen Pain.    TECHNIQUE: Transabdominal ultrasound was performed of the right upper quadrant. Additionally, spectral Doppler and color flow vascular ultrasound was performed.    FINDINGS:     Examination of the liver reveals homogeneous parenchymal echotexture.    No discrete masses are identified.     There is normal hepatopedal flow the main portal vein.    Previous cholecystectomy.      Common duct is normal in caliber.   The common bile duct measures  6 mm.    Borderline splenomegaly.  The spleen measures 12.7 cm.    Examination of the right kidney reveals no evidence of hydronephrosis. Cortical echotexture is normal.    The visualized pancreas is unremarkable.  Impression:   Previous cholecystectomy.  No suspicious masses.  Borderline splenomegaly.    Finalized on: 2024 10:14 AM By:  Martin Cortez MD  [facility]# [id number]      2024 10:[id number]    [facility]       Physical Exam   LOWER EXTREMITY PHYSICAL EXAMINATION    Musculoskeletal: Manual Muscle Testing is 5/5 with dorsiflexion, plantar flexion, abduction, and adduction.   There is normal range of motion in the forefoot, hindfoot, and Ankle joint.    Dermatological:  Skin is supple and moist.  Patient does have history of nail avulsion with the area of redness which is new.  Small open pocket at location.  No evidence of purulent drainage.      Assessment:     1. Abscess or cellulitis of toe, right        Plan: "     Abscess or cellulitis of toe, right    Other orders  -     clindamycin (CLEOCIN) 300 MG capsule; Take 1 capsule (300 mg total) by mouth every 8 (eight) hours. for 7 days  Dispense: 21 capsule; Refill: 0    Thorough discussion is had with the patient today, concerning the diagnosis, its etiology, and the treatment algorithm at present.    Does not appear to be associated with the nail but is adjacent to the nail.  The area was locally anesthetized with 3 cc of 1% lidocaine plain.  Once locally anesthetize the area was inspected and noted that there was a small pocket without underlying purulence.  No acute signs of infection.  The areas flushed with normal saline.  No evidence of underlying nail formation.    Future Appointments   Date Time Provider Department Center   5/29/2024  1:00 PM Gayatri Iniguez MD [facility] [place]   6/10/2024 11:30 AM Eryn Gong MD [facility] [place]

## 2024-04-05 NOTE — LETTER
April 5, 2024      [facility] 2nd Floor  [address] [facility] [address]  [place] [address]  Phone: [phone]  Fax: [phone]       Patient: Terri Tavera   YOB: 1990  Date of Visit: 04/05/2024    To Whom It May Concern:    Apple Tavera  was at [facility] on 04/05/2024. 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 If you have any questions or concerns, or if I can be of further assistance, please do not hesitate to contact me.    Sincerely,    Karlie Lund LPN

## 2024-04-16 ENCOUNTER — TELEPHONE (OUTPATIENT)
Dept: PHARMACY | Facility: CLINIC | Age: 34
End: 2024-04-16
Payer: COMMERCIAL

## 2024-04-23 ENCOUNTER — PATIENT MESSAGE (OUTPATIENT)
Dept: FAMILY MEDICINE | Facility: CLINIC | Age: 34
End: 2024-04-23
Payer: COMMERCIAL

## 2024-05-07 RX ORDER — PANTOPRAZOLE SODIUM 40 MG/1
40 TABLET, DELAYED RELEASE ORAL 2 TIMES DAILY
Qty: 60 TABLET | Refills: 3 | Status: SHIPPED | OUTPATIENT
Start: 2024-05-07

## 2024-05-21 ENCOUNTER — TELEPHONE (OUTPATIENT)
Dept: DERMATOLOGY | Facility: CLINIC | Age: 34
End: 2024-05-21
Payer: COMMERCIAL

## 2024-05-21 NOTE — TELEPHONE ENCOUNTER
Called and notified pt that dr. Iniguez will be out on 5/29. Offered to reschedule, pt reports she will do it in the portal

## 2024-06-03 ENCOUNTER — HOSPITAL ENCOUNTER (OUTPATIENT)
Dept: RADIOLOGY | Facility: HOSPITAL | Age: 34
Discharge: HOME | End: 2024-06-03
Attending: PHYSICIAN ASSISTANT
Payer: COMMERCIAL

## 2024-06-03 ENCOUNTER — LAB VISIT (OUTPATIENT)
Dept: LAB | Facility: HOSPITAL | Age: 34
End: 2024-06-03
Payer: COMMERCIAL

## 2024-06-03 ENCOUNTER — HOSPITAL ENCOUNTER (OUTPATIENT)
Dept: CARDIOLOGY | Facility: HOSPITAL | Age: 34
Discharge: HOME | End: 2024-06-03
Attending: PHYSICIAN ASSISTANT
Payer: COMMERCIAL

## 2024-06-03 ENCOUNTER — OFFICE VISIT (OUTPATIENT)
Dept: INTERNAL MEDICINE | Facility: CLINIC | Age: 34
End: 2024-06-03
Payer: COMMERCIAL

## 2024-06-03 VITALS
BODY MASS INDEX: 26.52 KG/M2 | OXYGEN SATURATION: 83 % | HEIGHT: 63 IN | WEIGHT: 149.69 LBS | DIASTOLIC BLOOD PRESSURE: 70 MMHG | HEART RATE: 83 BPM | SYSTOLIC BLOOD PRESSURE: 110 MMHG | TEMPERATURE: 98 F

## 2024-06-03 VITALS
SYSTOLIC BLOOD PRESSURE: 110 MMHG | DIASTOLIC BLOOD PRESSURE: 70 MMHG | WEIGHT: 149 LBS | HEIGHT: 63 IN | HEART RATE: 69 BPM | BODY MASS INDEX: 26.4 KG/M2

## 2024-06-03 DIAGNOSIS — M54.6 ACUTE MIDLINE THORACIC BACK PAIN: ICD-10-CM

## 2024-06-03 DIAGNOSIS — Z86.32 HISTORY OF GESTATIONAL DIABETES: ICD-10-CM

## 2024-06-03 DIAGNOSIS — Z00.00 ENCOUNTER FOR ANNUAL PHYSICAL EXAM: ICD-10-CM

## 2024-06-03 DIAGNOSIS — J06.9 VIRAL URI: ICD-10-CM

## 2024-06-03 DIAGNOSIS — T38.0X5D STEROID-INDUCED DIABETES MELLITUS, SUBSEQUENT ENCOUNTER: Primary | ICD-10-CM

## 2024-06-03 DIAGNOSIS — R42 ORTHOSTATIC DIZZINESS: ICD-10-CM

## 2024-06-03 DIAGNOSIS — E11.65 TYPE 2 DIABETES MELLITUS WITH HYPERGLYCEMIA, WITHOUT LONG-TERM CURRENT USE OF INSULIN: ICD-10-CM

## 2024-06-03 DIAGNOSIS — F32.A ANXIETY AND DEPRESSION: ICD-10-CM

## 2024-06-03 DIAGNOSIS — F41.9 ANXIETY AND DEPRESSION: ICD-10-CM

## 2024-06-03 DIAGNOSIS — E09.9 STEROID-INDUCED DIABETES MELLITUS, SUBSEQUENT ENCOUNTER: ICD-10-CM

## 2024-06-03 DIAGNOSIS — T38.0X5D STEROID-INDUCED DIABETES MELLITUS, SUBSEQUENT ENCOUNTER: ICD-10-CM

## 2024-06-03 DIAGNOSIS — Z85.6 HISTORY OF ACUTE LYMPHOBLASTIC LEUKEMIA (ALL): ICD-10-CM

## 2024-06-03 DIAGNOSIS — E09.9 STEROID-INDUCED DIABETES MELLITUS, SUBSEQUENT ENCOUNTER: Primary | ICD-10-CM

## 2024-06-03 LAB
ALBUMIN/CREAT UR: 20 UG/MG (ref 0–30)
AORTIC ROOT ANNULUS: 2.67 CM
ASCENDING AORTA: 2.25 CM
AV INDEX (PROSTH): 0.78
AV MEAN GRADIENT: 4 MMHG
AV PEAK GRADIENT: 7 MMHG
AV VALVE AREA BY VELOCITY RATIO: 2.03 CM²
AV VALVE AREA: 2.11 CM²
AV VELOCITY RATIO: 0.75
BILIRUB UR QL STRIP: NEGATIVE
BSA FOR ECHO PROCEDURE: 1.73 M2
CLARITY UR: CLEAR
COLOR UR: YELLOW
CREAT UR-MCNC: 165 MG/DL (ref 15–325)
CV ECHO LV RWT: 0.32 CM
DOP CALC AO PEAK VEL: 1.3 M/S
DOP CALC AO VTI: 24.8 CM
DOP CALC LVOT AREA: 2.7 CM2
DOP CALC LVOT DIAMETER: 1.86 CM
DOP CALC LVOT PEAK VEL: 0.97 M/S
DOP CALC LVOT STROKE VOLUME: 52.41 CM3
DOP CALC RVOT PEAK VEL: 0.89 M/S
DOP CALC RVOT VTI: 19 CM
DOP CALCLVOT PEAK VEL VTI: 19.3 CM
E WAVE DECELERATION TIME: 229.65 MSEC
E/A RATIO: 0.88
E/E' RATIO: 5.73 M/S
ECHO LV POSTERIOR WALL: 0.74 CM (ref 0.6–1.1)
FRACTIONAL SHORTENING: 37 % (ref 28–44)
GLUCOSE UR QL STRIP: NEGATIVE
HGB UR QL STRIP: NEGATIVE
INTERVENTRICULAR SEPTUM: 0.76 CM (ref 0.6–1.1)
IVC DIAMETER: 1.27 CM
IVRT: 70.41 MSEC
KETONES UR QL STRIP: NEGATIVE
LA MAJOR: 5.21 CM
LA MINOR: 4.87 CM
LA WIDTH: 2.6 CM
LEFT ATRIUM SIZE: 3.28 CM
LEFT ATRIUM VOLUME INDEX MOD: 16.6 ML/M2
LEFT ATRIUM VOLUME INDEX: 21.3 ML/M2
LEFT ATRIUM VOLUME MOD: 28.44 CM3
LEFT ATRIUM VOLUME: 36.49 CM3
LEFT INTERNAL DIMENSION IN SYSTOLE: 2.86 CM (ref 2.1–4)
LEFT VENTRICLE DIASTOLIC VOLUME INDEX: 56.13 ML/M2
LEFT VENTRICLE DIASTOLIC VOLUME: 95.98 ML
LEFT VENTRICLE MASS INDEX: 63 G/M2
LEFT VENTRICLE SYSTOLIC VOLUME INDEX: 18.2 ML/M2
LEFT VENTRICLE SYSTOLIC VOLUME: 31.14 ML
LEFT VENTRICULAR INTERNAL DIMENSION IN DIASTOLE: 4.57 CM (ref 3.5–6)
LEFT VENTRICULAR MASS: 107.27 G
LEUKOCYTE ESTERASE UR QL STRIP: NEGATIVE
LV LATERAL E/E' RATIO: 5.25 M/S
LV SEPTAL E/E' RATIO: 6.3 M/S
LVOT MG: 2.09 MMHG
LVOT MV: 0.67 CM/S
MICROALBUMIN UR DL<=1MG/L-MCNC: 33 UG/ML
MV PEAK A VEL: 0.72 M/S
MV PEAK E VEL: 0.63 M/S
MV STENOSIS PRESSURE HALF TIME: 66.6 MS
MV VALVE AREA P 1/2 METHOD: 3.3 CM2
NITRITE UR QL STRIP: NEGATIVE
OHS CV RV/LV RATIO: 0.56 CM
PH UR STRIP: 7 [PH] (ref 5–8)
PROT UR QL STRIP: NEGATIVE
PULM VEIN S/D RATIO: 1.31
PV MEAN GRADIENT: 2 MMHG
PV PEAK D VEL: 0.36 M/S
PV PEAK GRADIENT: 3 MMHG
PV PEAK S VEL: 0.47 M/S
PV PEAK VELOCITY: 1.28 M/S
RA MAJOR: 3.28 CM
RA PRESSURE ESTIMATED: 3 MMHG
RA WIDTH: 2.45 CM
RIGHT VENTRICULAR END-DIASTOLIC DIMENSION: 2.57 CM
SINUS: 2.47 CM
SP GR UR STRIP: 1.02 (ref 1–1.03)
STJ: 2.45 CM
TDI LATERAL: 0.12 M/S
TDI SEPTAL: 0.1 M/S
TDI: 0.11 M/S
TRICUSPID ANNULAR PLANE SYSTOLIC EXCURSION: 2.33 CM
URN SPEC COLLECT METH UR: NORMAL
Z-SCORE OF LEFT VENTRICULAR DIMENSION IN END DIASTOLE: -0.38
Z-SCORE OF LEFT VENTRICULAR DIMENSION IN END SYSTOLE: -0.22

## 2024-06-03 PROCEDURE — 72070 X-RAY EXAM THORAC SPINE 2VWS: CPT | Mod: TC

## 2024-06-03 PROCEDURE — 72070 X-RAY EXAM THORAC SPINE 2VWS: CPT | Mod: 26,,, | Performed by: RADIOLOGY

## 2024-06-03 PROCEDURE — 82043 UR ALBUMIN QUANTITATIVE: CPT | Performed by: PHYSICIAN ASSISTANT

## 2024-06-03 PROCEDURE — 93306 TTE W/DOPPLER COMPLETE: CPT

## 2024-06-03 PROCEDURE — 81003 URINALYSIS AUTO W/O SCOPE: CPT | Performed by: PHYSICIAN ASSISTANT

## 2024-06-03 PROCEDURE — 99999 PR PBB SHADOW E&M-EST. PATIENT-LVL IV: CPT | Mod: PBBFAC,,, | Performed by: PHYSICIAN ASSISTANT

## 2024-06-03 PROCEDURE — 3078F DIAST BP <80 MM HG: CPT | Mod: CPTII,S$GLB,, | Performed by: PHYSICIAN ASSISTANT

## 2024-06-03 PROCEDURE — 1160F RVW MEDS BY RX/DR IN RCRD: CPT | Mod: CPTII,S$GLB,, | Performed by: PHYSICIAN ASSISTANT

## 2024-06-03 PROCEDURE — 3008F BODY MASS INDEX DOCD: CPT | Mod: CPTII,S$GLB,, | Performed by: PHYSICIAN ASSISTANT

## 2024-06-03 PROCEDURE — 1159F MED LIST DOCD IN RCRD: CPT | Mod: CPTII,S$GLB,, | Performed by: PHYSICIAN ASSISTANT

## 2024-06-03 PROCEDURE — 3074F SYST BP LT 130 MM HG: CPT | Mod: CPTII,S$GLB,, | Performed by: PHYSICIAN ASSISTANT

## 2024-06-03 PROCEDURE — 93306 TTE W/DOPPLER COMPLETE: CPT | Mod: 26,,, | Performed by: INTERNAL MEDICINE

## 2024-06-03 PROCEDURE — 99204 OFFICE O/P NEW MOD 45 MIN: CPT | Mod: S$GLB,,, | Performed by: PHYSICIAN ASSISTANT

## 2024-06-03 RX ORDER — TIRZEPATIDE 5 MG/.5ML
5 INJECTION, SOLUTION SUBCUTANEOUS
Qty: 4 PEN | Refills: 5 | Status: SHIPPED | OUTPATIENT
Start: 2024-06-03

## 2024-06-03 RX ORDER — METHOCARBAMOL 750 MG/1
750 TABLET, FILM COATED ORAL 3 TIMES DAILY PRN
Qty: 30 TABLET | Refills: 1 | Status: SHIPPED | OUTPATIENT
Start: 2024-06-03 | End: 2024-06-23

## 2024-06-03 NOTE — PROGRESS NOTES
Subjective:      Patient ID: Terri Tavera is a 34 y.o. female.    Chief Complaint: Annual Exam    HPI  Here today to establish care. Previous pcp was Dr. Gong.   Twin boys 17 months.   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   URI for the past 4-5 days. NO fever. Benadryl at night, advil. Runny nose and dry cough.     Dizzy when going from sitting to standing for the past month. Squatting or kneeling for minute and then going to stand up gets dizzy. Only lasts a few seconds. Checked her own orthostatics and it was negative for orthostatic hypotension. Could be dehydration? Pt states that it only happens when she is squatting or kneeling and then goes to stand. No dizziness with going from sitting to standing or with bowel movements (straining).    Maternal grandfather had heart attack at 38 but heavy smoking. Mom has hypothyroid. Passed away from gall bladder cancer 2 years ago.   Paternal grandfather had recurrent heart attacks as well.     Had high blood pressure postpartum and was on labetolol but htn has resolved.   On mounjaro 5 mg. Skipped last week.   Seeing psychiatrist.     Thoracic back pain for the past month.  Has tried 500mg robaxin and hasn't helped. Takes advil intermittently. Has done pt/ot.   ALL when 12-14 and was on dexamethasone for a long period of time. Triggered diabetes. Recent A1c has been stable.       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)    Type 2 diabetes mellitus with hyperglycemia, without long-term current use of insulin    BMI 30.0-30.9,adult    History of acute lymphoblastic leukemia (ALL)         Current Outpatient Medications:     citalopram (CELEXA) 40 MG tablet, Take 40 mg by mouth once daily., Disp: , Rfl:     clonazePAM (KLONOPIN) 1 MG tablet, Take 1 mg by mouth 2 (two) times daily as needed for Anxiety., Disp: , Rfl:     DULoxetine (CYMBALTA) 20 MG  capsule, Take 20 mg by mouth once daily., Disp: , Rfl:     ergocalciferol (VITAMIN D2) 50,000 unit Cap, Take 1 capsule (50,000 Units total) by mouth every 7 days., Disp: 4 capsule, Rfl: 5    fluocinonide (LIDEX) 0.05 % external solution, Apply to scalp once daily, Disp: 60 mL, Rfl: 5    levonorgestreL (MIRENA) 21 mcg/24 hours (8 yrs) 52 mg IUD, 1 each by Intrauterine route once., Disp: , Rfl:     methocarbamoL (ROBAXIN) 750 MG Tab, Take 1 tablet (750 mg total) by mouth 3 (three) times daily as needed (back pain)., Disp: 30 tablet, Rfl: 1    tirzepatide (MOUNJARO) 2.5 mg/0.5 mL PnIj, Inject 2.5 mg into the skin every 7 days., Disp: 4 pen , Rfl: 5    tirzepatide (MOUNJARO) 5 mg/0.5 mL PnIj, Inject 5 mg into the skin every 7 days., Disp: 4 Pen, Rfl: 5    Review of Systems   Constitutional:  Negative for activity change, appetite change, chills, diaphoresis, fatigue, fever and unexpected weight change.   HENT:  Positive for postnasal drip and rhinorrhea. Negative for congestion, hearing loss, sinus pressure, sinus pain, sneezing, sore throat, tinnitus, trouble swallowing and voice change.    Eyes: Negative.  Negative for visual disturbance.   Respiratory: Negative.  Negative for cough, choking, chest tightness and shortness of breath.    Cardiovascular:  Negative for chest pain, palpitations and leg swelling.   Gastrointestinal:  Negative for abdominal distention, abdominal pain, blood in stool, constipation, diarrhea, nausea and vomiting.   Endocrine: Negative for cold intolerance, heat intolerance, polydipsia and polyuria.   Genitourinary: Negative.  Negative for difficulty urinating and frequency.   Musculoskeletal:  Positive for back pain. Negative for arthralgias, gait problem, joint swelling and myalgias.   Skin:  Negative for color change, pallor, rash and wound.   Neurological:  Positive for dizziness. Negative for tremors, weakness, light-headedness, numbness and headaches.   Hematological:  Negative for  "adenopathy.   Psychiatric/Behavioral:  Negative for behavioral problems, confusion, self-injury, sleep disturbance and suicidal ideas. The patient is not nervous/anxious.      Objective:   /70 (BP Location: Left arm, Patient Position: Sitting, BP Method: Large (Manual))   Pulse 83   Temp 97.7 °F (36.5 °C) (Tympanic)   Ht 5' 3" (1.6 m)   Wt 67.9 kg (149 lb 11.1 oz)   SpO2 (!) 83%   BMI 26.52 kg/m²     Physical Exam  Vitals reviewed.   Constitutional:       General: She is not in acute distress.     Appearance: Normal appearance. She is well-developed. She is not ill-appearing, toxic-appearing or diaphoretic.   HENT:      Head: Normocephalic and atraumatic.      Right Ear: External ear normal.      Left Ear: External ear normal.      Nose: Nose normal.   Eyes:      Conjunctiva/sclera: Conjunctivae normal.      Pupils: Pupils are equal, round, and reactive to light.   Cardiovascular:      Rate and Rhythm: Normal rate and regular rhythm.      Heart sounds: Normal heart sounds. No murmur heard.     No friction rub. No gallop.   Pulmonary:      Effort: Pulmonary effort is normal. No respiratory distress.      Breath sounds: Normal breath sounds. No wheezing or rales.   Chest:      Chest wall: No tenderness.   Abdominal:      General: There is no distension.      Palpations: Abdomen is soft.      Tenderness: There is no abdominal tenderness.   Musculoskeletal:         General: Normal range of motion.      Cervical back: Normal range of motion and neck supple.      Thoracic back: Tenderness and bony tenderness present. No swelling, edema, deformity, signs of trauma, lacerations or spasms. Normal range of motion. No scoliosis.   Lymphadenopathy:      Cervical: No cervical adenopathy.   Skin:     General: Skin is warm and dry.      Capillary Refill: Capillary refill takes less than 2 seconds.      Findings: No rash.   Neurological:      Mental Status: She is alert and oriented to person, place, and time.      " Motor: No weakness.      Coordination: Coordination normal.      Gait: Gait normal.   Psychiatric:         Mood and Affect: Mood normal.         Behavior: Behavior normal.         Thought Content: Thought content normal.         Judgment: Judgment normal.         Narrative & Impression  EXAMINATION:  MRI THORACIC SPINE WITHOUT CONTRAST     CLINICAL HISTORY:  Mid-back/thoracic spine pain, first study;thoracic pain;  Pain in thoracic spine     TECHNIQUE:  Multiplanar MR imaging of the thoracic spine was performed utilizing sagittal T1, T2, and STIR, and axial T2 pulse sequences without intravenous contrast.     COMPARISON:  None.     FINDINGS:  The thoracic spine demonstrates proper alignment. There is nonspecific heterogeneous marrow signal likely related to patient's history of ALL.  No acute fracture.  The intervertebral disk spaces also appear well-maintained. Degenerative changes consistof small disc protrusions at T7-T8, T9- T10 and T11-T12.  No significant spinal canal stenosis or neural foraminal narrowing.     The demonstrated portion of the spinal cord is normal in signal intensity at all levels with no indication of myelomalacia or cord edema. No intradural signal abnormalities are apparent. Evaluation of the surrounding soft tissue structures reveals no acute abnormality.     Impression:     No significant spinal canal stenosis or neural foraminal narrowing.     Electronically signed by resident: Isaiah Abraham  Date:                                            10/22/2019  Time:                                           08:18  Assessment:     1. Steroid-induced diabetes mellitus, subsequent encounter    2. Type 2 diabetes mellitus with hyperglycemia, without long-term current use of insulin    3. History of gestational diabetes    4. BMI 30.0-30.9,adult    5. Anxiety and depression    6. History of acute lymphoblastic leukemia (ALL)    7. Acute midline thoracic back pain    8. Encounter for annual physical  exam    9. Orthostatic dizziness    10. Viral URI      Plan:   Steroid-induced diabetes mellitus, subsequent encounter  Comments:  history of ALL, dexamethasone  Orders:  -     tirzepatide (MOUNJARO) 5 mg/0.5 mL PnIj; Inject 5 mg into the skin every 7 days.  Dispense: 4 Pen; Refill: 5  -     Hemoglobin A1C; Future; Expected date: 06/03/2024  -     Microalbumin/Creatinine Ratio, Urine; Future; Expected date: 06/03/2024    Type 2 diabetes mellitus with hyperglycemia, without long-term current use of insulin  -stable on mounjaro    History of gestational diabetes    BMI 30.0-30.9,adult  Advise to maintain lifestyle changes with low carbohydrate, low sugar diet and exercise 30 minutes daily    Anxiety and depression  -     TSH; Future    History of acute lymphoblastic leukemia (ALL)  -stable    Acute midline thoracic back pain  -     Urinalysis; Future  -     X-Ray Thoracic Spine AP Lateral; Future; Expected date: 06/03/2024  -     methocarbamoL (ROBAXIN) 750 MG Tab; Take 1 tablet (750 mg total) by mouth 3 (three) times daily as needed (back pain).  Dispense: 30 tablet; Refill: 1    Encounter for annual physical exam  -     CBC Auto Differential; Future; Expected date: 06/03/2024  -     Comprehensive Metabolic Panel; Future; Expected date: 06/03/2024  -     Lipid Panel; Future; Expected date: 06/03/2024  -     Urinalysis; Future    Orthostatic dizziness  -     Holter monitor - 48 hour; Future  -     Echo; Future    Viral URI  -start otc multisymptom relief medication.   -if symptoms worse after 10 days, message me      Follow up in about 4 weeks (around 7/1/2024), or if symptoms worsen or fail to improve.

## 2024-06-05 ENCOUNTER — PATIENT MESSAGE (OUTPATIENT)
Dept: INTERNAL MEDICINE | Facility: CLINIC | Age: 34
End: 2024-06-05
Payer: COMMERCIAL

## 2024-06-07 DIAGNOSIS — E11.65 TYPE 2 DIABETES MELLITUS WITH HYPERGLYCEMIA, WITHOUT LONG-TERM CURRENT USE OF INSULIN: ICD-10-CM

## 2024-06-07 RX ORDER — TIRZEPATIDE 2.5 MG/.5ML
INJECTION, SOLUTION SUBCUTANEOUS
Qty: 2 ML | Refills: 2 | Status: SHIPPED | OUTPATIENT
Start: 2024-06-07

## 2024-07-08 ENCOUNTER — TELEPHONE (OUTPATIENT)
Dept: INTERNAL MEDICINE | Facility: CLINIC | Age: 34
End: 2024-07-08

## 2024-07-08 ENCOUNTER — OFFICE VISIT (OUTPATIENT)
Dept: INTERNAL MEDICINE | Facility: CLINIC | Age: 34
End: 2024-07-08
Payer: COMMERCIAL

## 2024-07-08 VITALS
RESPIRATION RATE: 18 BRPM | BODY MASS INDEX: 26.37 KG/M2 | HEART RATE: 103 BPM | WEIGHT: 148.81 LBS | DIASTOLIC BLOOD PRESSURE: 74 MMHG | SYSTOLIC BLOOD PRESSURE: 116 MMHG | TEMPERATURE: 97 F | OXYGEN SATURATION: 99 % | HEIGHT: 63 IN

## 2024-07-08 DIAGNOSIS — F32.A ANXIETY AND DEPRESSION: ICD-10-CM

## 2024-07-08 DIAGNOSIS — E09.9 STEROID-INDUCED DIABETES MELLITUS, SUBSEQUENT ENCOUNTER: ICD-10-CM

## 2024-07-08 DIAGNOSIS — M54.6 ACUTE MIDLINE THORACIC BACK PAIN: ICD-10-CM

## 2024-07-08 DIAGNOSIS — Z86.32 HISTORY OF GESTATIONAL DIABETES: ICD-10-CM

## 2024-07-08 DIAGNOSIS — T38.0X5D STEROID-INDUCED DIABETES MELLITUS, SUBSEQUENT ENCOUNTER: ICD-10-CM

## 2024-07-08 DIAGNOSIS — F41.9 ANXIETY AND DEPRESSION: ICD-10-CM

## 2024-07-08 DIAGNOSIS — Z23 NEED FOR PNEUMOCOCCAL VACCINATION: ICD-10-CM

## 2024-07-08 DIAGNOSIS — Z85.6 HISTORY OF ACUTE LYMPHOBLASTIC LEUKEMIA (ALL): ICD-10-CM

## 2024-07-08 DIAGNOSIS — E11.65 TYPE 2 DIABETES MELLITUS WITH HYPERGLYCEMIA, WITHOUT LONG-TERM CURRENT USE OF INSULIN: Primary | ICD-10-CM

## 2024-07-08 PROCEDURE — 90677 PCV20 VACCINE IM: CPT | Mod: S$GLB,,, | Performed by: PHYSICIAN ASSISTANT

## 2024-07-08 PROCEDURE — 3061F NEG MICROALBUMINURIA REV: CPT | Mod: CPTII,S$GLB,, | Performed by: PHYSICIAN ASSISTANT

## 2024-07-08 PROCEDURE — 3074F SYST BP LT 130 MM HG: CPT | Mod: CPTII,S$GLB,, | Performed by: PHYSICIAN ASSISTANT

## 2024-07-08 PROCEDURE — 3066F NEPHROPATHY DOC TX: CPT | Mod: CPTII,S$GLB,, | Performed by: PHYSICIAN ASSISTANT

## 2024-07-08 PROCEDURE — 3078F DIAST BP <80 MM HG: CPT | Mod: CPTII,S$GLB,, | Performed by: PHYSICIAN ASSISTANT

## 2024-07-08 PROCEDURE — 3008F BODY MASS INDEX DOCD: CPT | Mod: CPTII,S$GLB,, | Performed by: PHYSICIAN ASSISTANT

## 2024-07-08 PROCEDURE — 1159F MED LIST DOCD IN RCRD: CPT | Mod: CPTII,S$GLB,, | Performed by: PHYSICIAN ASSISTANT

## 2024-07-08 PROCEDURE — 99999 PR PBB SHADOW E&M-EST. PATIENT-LVL IV: CPT | Mod: PBBFAC,,, | Performed by: PHYSICIAN ASSISTANT

## 2024-07-08 PROCEDURE — 99214 OFFICE O/P EST MOD 30 MIN: CPT | Mod: 25,S$GLB,, | Performed by: PHYSICIAN ASSISTANT

## 2024-07-08 PROCEDURE — 3044F HG A1C LEVEL LT 7.0%: CPT | Mod: CPTII,S$GLB,, | Performed by: PHYSICIAN ASSISTANT

## 2024-07-08 PROCEDURE — 90471 IMMUNIZATION ADMIN: CPT | Mod: S$GLB,,, | Performed by: PHYSICIAN ASSISTANT

## 2024-07-08 RX ORDER — ALPRAZOLAM 0.25 MG/1
0.25 TABLET ORAL
COMMUNITY
Start: 2024-07-03

## 2024-07-08 NOTE — PROGRESS NOTES
Subjective:      Patient ID: Terri Tavera is a 34 y.o. female.    Chief Complaint: Follow-up    HPI  Apt to a follow up to review recent labs and testing. Also review her home bp and heart rate readings. Home heart rate 90s-115. BP in good range 110-120 systolic 60-70 diastolic.   Dizziness when rapidly going from sitting to standing. Doesn't happen when she stands up slow. I ordered her an echo which was normal. Unable to afford holter due to large copay. Pt states that she checked her bp and heart rate when she had those symtpoms but all in normal range.   DM stable and controlled on mounjaro 5.   Seeing psych, up to date. Stable on celexa and cymbalta. Intermittent klonopin.     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)    Type 2 diabetes mellitus with hyperglycemia, without long-term current use of insulin    History of acute lymphoblastic leukemia (ALL)         Current Outpatient Medications:     ALPRAZolam (XANAX) 0.25 MG tablet, Take 0.25 mg by mouth., Disp: , Rfl:     citalopram (CELEXA) 40 MG tablet, Take 40 mg by mouth once daily., Disp: , Rfl:     clonazePAM (KLONOPIN) 1 MG tablet, Take 1 mg by mouth 2 (two) times daily as needed for Anxiety., Disp: , Rfl:     DULoxetine (CYMBALTA) 20 MG capsule, Take 20 mg by mouth once daily., Disp: , Rfl:     ergocalciferol (VITAMIN D2) 50,000 unit Cap, Take 1 capsule (50,000 Units total) by mouth every 7 days., Disp: 4 capsule, Rfl: 5    levonorgestreL (MIRENA) 21 mcg/24 hours (8 yrs) 52 mg IUD, 1 each by Intrauterine route once., Disp: , Rfl:     tirzepatide (MOUNJARO) 5 mg/0.5 mL PnIj, Inject 5 mg into the skin every 7 days., Disp: 4 Pen, Rfl: 5    fluocinonide (LIDEX) 0.05 % external solution, Apply to scalp once daily, Disp: 60 mL, Rfl: 5    Current Facility-Administered Medications:     pneumoc 20-nancy conj-dip cr(PF) (PREVNAR-20  "(PF)) injection Syrg 0.5 mL, 0.5 mL, Intramuscular, 1 time in Clinic/HOD, Sol Pineda PA-C    Review of Systems   Constitutional:  Negative for activity change, appetite change, chills, diaphoresis, fatigue, fever and unexpected weight change.   HENT: Negative.  Negative for congestion, hearing loss, postnasal drip, rhinorrhea, sore throat, trouble swallowing and voice change.    Eyes: Negative.  Negative for visual disturbance.   Respiratory: Negative.  Negative for cough, choking, chest tightness and shortness of breath.    Cardiovascular:  Negative for chest pain, palpitations and leg swelling.   Gastrointestinal:  Negative for abdominal distention, abdominal pain, blood in stool, constipation, diarrhea, nausea and vomiting.   Endocrine: Negative for cold intolerance, heat intolerance, polydipsia and polyuria.   Genitourinary: Negative.  Negative for difficulty urinating and frequency.   Musculoskeletal:  Negative for arthralgias, back pain, gait problem, joint swelling and myalgias.   Skin:  Negative for color change, pallor, rash and wound.   Neurological:  Negative for dizziness, tremors, weakness, light-headedness, numbness and headaches.   Hematological:  Negative for adenopathy.   Psychiatric/Behavioral:  Negative for behavioral problems, confusion, self-injury, sleep disturbance and suicidal ideas. The patient is not nervous/anxious.      Objective:   /74 (BP Location: Left arm, Patient Position: Sitting, BP Method: Medium (Manual))   Pulse 103   Temp 97 °F (36.1 °C) (Tympanic)   Resp 18   Ht 5' 3" (1.6 m)   Wt 67.5 kg (148 lb 13 oz)   SpO2 99%   BMI 26.36 kg/m²     Physical Exam  Vitals reviewed.   Constitutional:       General: She is not in acute distress.     Appearance: Normal appearance. She is well-developed. She is not ill-appearing, toxic-appearing or diaphoretic.   HENT:      Head: Normocephalic and atraumatic.      Right Ear: External ear normal.      Left Ear: External " ear normal.      Nose: Nose normal.   Eyes:      Conjunctiva/sclera: Conjunctivae normal.      Pupils: Pupils are equal, round, and reactive to light.   Cardiovascular:      Rate and Rhythm: Normal rate and regular rhythm.      Pulses:           Dorsalis pedis pulses are 2+ on the right side and 2+ on the left side.        Posterior tibial pulses are 2+ on the right side and 2+ on the left side.      Heart sounds: Normal heart sounds. No murmur heard.     No friction rub. No gallop.   Pulmonary:      Effort: Pulmonary effort is normal. No respiratory distress.      Breath sounds: Normal breath sounds. No wheezing or rales.   Chest:      Chest wall: No tenderness.   Abdominal:      General: There is no distension.      Palpations: Abdomen is soft.      Tenderness: There is no abdominal tenderness.   Musculoskeletal:         General: Normal range of motion.      Cervical back: Normal range of motion and neck supple.      Right foot: Normal range of motion.      Left foot: Normal range of motion.   Feet:      Right foot:      Protective Sensation: 10 sites tested.  10 sites sensed.      Skin integrity: Skin integrity normal.      Toenail Condition: Right toenails are normal.      Left foot:      Protective Sensation: 10 sites tested.  10 sites sensed.      Skin integrity: Skin integrity normal.      Toenail Condition: Left toenails are normal.   Lymphadenopathy:      Cervical: No cervical adenopathy.   Skin:     General: Skin is warm and dry.      Capillary Refill: Capillary refill takes less than 2 seconds.      Findings: No rash.   Neurological:      Mental Status: She is alert and oriented to person, place, and time.      Motor: No weakness.      Coordination: Coordination normal.      Gait: Gait normal.   Psychiatric:         Mood and Affect: Mood normal.         Behavior: Behavior normal.         Thought Content: Thought content normal.         Judgment: Judgment normal.       No visits with results within 1  Month(s) from this visit.   Latest known visit with results is:   Hospital Outpatient Visit on 06/03/2024   Component Date Value Ref Range Status    RA Width 06/03/2024 2.45  cm Final    LA volume (mod) 06/03/2024 28.44  cm3 Final    Left Atrium Major Axis 06/03/2024 5.21  cm Final    Left Atrium Minor Axis 06/03/2024 4.87  cm Final    RA Major Axis 06/03/2024 3.28  cm Final    LV Diastolic Volume 06/03/2024 95.98  mL Final    LV Systolic Volume 06/03/2024 31.14  mL Final    PV Peak D Antoine 06/03/2024 0.36  m/s Final    PV Peak S Antoine 06/03/2024 0.47  m/s Final    MV Peak A Antoine 06/03/2024 0.72  m/s Final    MV stenosis pressure 1/2 time 06/03/2024 66.60  ms Final    MV Peak E Antoine 06/03/2024 0.63  m/s Final    PV mean gradient 06/03/2024 2  mmHg Final    RVOT peak VTI 06/03/2024 19.0  cm Final    RVOT peak antoine 06/03/2024 0.89  m/s Final    Ao VTI 06/03/2024 24.80  cm Final    Ao peak antoine 06/03/2024 1.30  m/s Final    LVOT peak VTI 06/03/2024 19.30  cm Final    LVOT peak antoine 06/03/2024 0.97  m/s Final    LVOT diameter 06/03/2024 1.86  cm Final    IVRT 06/03/2024 70.41  msec Final    E wave deceleration time 06/03/2024 229.65  msec Final    PV peak gradient 06/03/2024 3  mmHg Final    AV mean gradient 06/03/2024 4  mmHg Final    TAPSE 06/03/2024 2.33  cm Final    RVDD 06/03/2024 2.57  cm Final    LA size 06/03/2024 3.28  cm Final    Ascending aorta 06/03/2024 2.25  cm Final    STJ 06/03/2024 2.45  cm Final    Sinus 06/03/2024 2.47  cm Final    LVIDs 06/03/2024 2.86  2.1 - 4.0 cm Final    Ao root annulus 06/03/2024 2.67  cm Final    Posterior Wall 06/03/2024 0.74  0.6 - 1.1 cm Final    IVS 06/03/2024 0.76  0.6 - 1.1 cm Final    LVIDd 06/03/2024 4.57  3.5 - 6.0 cm Final    PV PEAK VELOCITY 06/03/2024 1.28  m/s Final    TDI LATERAL 06/03/2024 0.12  m/s Final    Left Ventricular Outflow Tract Elissa* 06/03/2024 2.09  mmHg Final    Left Ventricular Outflow Tract Elissa* 06/03/2024 0.67  cm/s Final    IVC diameter 06/03/2024 1.27  cm  Final    TDI SEPTAL 06/03/2024 0.10  m/s Final    LV LATERAL E/E' RATIO 06/03/2024 5.25  m/s Final    LV SEPTAL E/E' RATIO 06/03/2024 6.30  m/s Final    RV/LV Ratio 06/03/2024 0.56  cm Final    FS 06/03/2024 37  28 - 44 % Final    LV mass 06/03/2024 107.27  g Final    ZLVIDD 06/03/2024 -0.38   Final    ZLVIDS 06/03/2024 -0.22   Final    Left Ventricle Relative Wall Thick* 06/03/2024 0.32  cm Final    AV valve area 06/03/2024 2.11  cm² Final    AV Velocity Ratio 06/03/2024 0.75   Final    AV index (prosthetic) 06/03/2024 0.78   Final    MV valve area p 1/2 method 06/03/2024 3.30  cm2 Final    E/A ratio 06/03/2024 0.88   Final    Mean e' 06/03/2024 0.11  m/s Final    Pulm vein S/D ratio 06/03/2024 1.31   Final    LVOT area 06/03/2024 2.7  cm2 Final    LVOT stroke volume 06/03/2024 52.41  cm3 Final    AV peak gradient 06/03/2024 7  mmHg Final    E/E' ratio 06/03/2024 5.73  m/s Final    LV Systolic Volume Index 06/03/2024 18.2  mL/m2 Final    LV Diastolic Volume Index 06/03/2024 56.13  mL/m2 Final    LV Mass Index 06/03/2024 63  g/m2 Final    LA Volume Index (Mod) 06/03/2024 16.6  mL/m2 Final    JAQUELINE by Velocity Ratio 06/03/2024 2.03  cm² Final    BSA 06/03/2024 1.73  m2 Final    LA Volume Index 06/03/2024 21.3  mL/m2 Final    LA volume 06/03/2024 36.49  cm3 Final    LA WIDTH 06/03/2024 2.6  cm Final    Est. RA pres 06/03/2024 3  mmHg Final     X-Ray Thoracic Spine AP Lateral  Narrative: EXAM: XR THORACIC SPINE AP LATERAL    CLINICAL HISTORY: Back pain    FINDINGS:  The alignment is normal.  Disc spaces and vertebral body heights are maintained.  The pedicles are visible at all levels bilaterally.  Impression:  No evidence of acute injury or significant degenerative changes.    Finalized on: 6/10/2024 10:59 AM By:  Yomi Salgado  [facility]# [id number]      2024-06-10 11:01:54.220    [facility]     Assessment:     1. Type 2 diabetes mellitus with hyperglycemia, without long-term current use of insulin    2. Anxiety and depression     3. Acute midline thoracic back pain    4. Steroid-induced diabetes mellitus, subsequent encounter    5. History of gestational diabetes    6. History of acute lymphoblastic leukemia (ALL)    7. Need for pneumococcal vaccination      Plan:   Type 2 diabetes mellitus with hyperglycemia, without long-term current use of insulin  -     Ambulatory referral/consult to Optometry; Future; Expected date: 07/15/2024  -     Hemoglobin A1C; Future; Expected date: 01/08/2025  -recheck A1c in 6 months  -refer to optometry for annual eye exam  -normal foot exam today    Anxiety and depression  -stable and up to date with psychiatry    Acute midline thoracic back pain  -stretching/exercise at home  -if worsens will refer to spine specialist    History of gestational diabetes  Steroid-induced diabetes mellitus, subsequent encounter  -stable on mounjaro    History of acute lymphoblastic leukemia (ALL)  -labs stable.   Echo reviewed and normal.   Monitor orthostatic dizziness and take time when going from sitting to standing.   -DM stable on mounjaro.   -A1c in 6 months.     Need for pneumococcal vaccine  -administer pcv 20 today    Follow up in about 6 months (around 1/8/2025), or if symptoms worsen or fail to improve.

## 2024-07-16 ENCOUNTER — OFFICE VISIT (OUTPATIENT)
Dept: PODIATRY | Facility: CLINIC | Age: 34
End: 2024-07-16
Payer: COMMERCIAL

## 2024-07-16 VITALS — WEIGHT: 148.81 LBS | HEIGHT: 63 IN | BODY MASS INDEX: 26.37 KG/M2

## 2024-07-16 DIAGNOSIS — L60.0 INGROWN TOENAIL: ICD-10-CM

## 2024-07-16 DIAGNOSIS — L60.3 DYSTROPHY OF NAIL DUE TO TRAUMA: Primary | ICD-10-CM

## 2024-07-16 PROCEDURE — 3044F HG A1C LEVEL LT 7.0%: CPT | Mod: CPTII,S$GLB,, | Performed by: PODIATRIST

## 2024-07-16 PROCEDURE — 3066F NEPHROPATHY DOC TX: CPT | Mod: CPTII,S$GLB,, | Performed by: PODIATRIST

## 2024-07-16 PROCEDURE — 99213 OFFICE O/P EST LOW 20 MIN: CPT | Mod: S$GLB,,, | Performed by: PODIATRIST

## 2024-07-16 PROCEDURE — 1159F MED LIST DOCD IN RCRD: CPT | Mod: CPTII,S$GLB,, | Performed by: PODIATRIST

## 2024-07-16 PROCEDURE — 3061F NEG MICROALBUMINURIA REV: CPT | Mod: CPTII,S$GLB,, | Performed by: PODIATRIST

## 2024-07-16 PROCEDURE — 3008F BODY MASS INDEX DOCD: CPT | Mod: CPTII,S$GLB,, | Performed by: PODIATRIST

## 2024-07-16 PROCEDURE — 99999 PR PBB SHADOW E&M-EST. PATIENT-LVL III: CPT | Mod: PBBFAC,,, | Performed by: PODIATRIST

## 2024-07-16 NOTE — PROGRESS NOTES
"    Podiatry Department    Patient ID: Terri Tavera is a 34 y.o. female.    Chief Complaint: Nail Problem (C/o poss ingrown, right great toenail, medial border, x 1 week, 0 pain, diabetic, wears sandals)    History of Present Illness    CHIEF COMPLAINT:  Patient presents today for follow up of right hallux medial border after prior ingrown nail procedure.    RIGHT HALLUX MEDIAL BORDER:  She had an ingrown nail procedure on the right hallux medial border earlier this year. Following the procedure, she experienced redness and irritation on the side of the nail and subsequently removed a "decent size something" from near the corner of the nail. Since then, the nail has been growing back abnormally, described as "funny and lumpy." She is concerned about the nail potentially growing into the skin. About a week ago, she experienced pain at the top of the toe causing discomfort when wearing shoes. She denies any current signs of infection or drainage.    HAIR LOSS:  She reports hair thinning and has been taking biotin supplements to address this issue.    ROS:  Musculoskeletal: +joint pain            Physical Exam    Skin: Prior ingrown nail procedure on right hallux medial border. Area clean, dry, and intact. No drainage. No acute signs of infection. Horizontal ridging noted at distal aspect of nail plate.           Diagnoses:  1. 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 Ingrown toenail        Assessment & Plan    - Patient has horizontal ridging on right hallux nail plate secondary to prior ingrown nail procedure and self-removal of ingrown nail  - No signs of acute infection  - Will treat conservatively with topical nail strengtheners and filing  NAIL TRAUMA:  - Educated on how nails can grow abnormally after trauma or procedures.  - Patient to file the nail ridge to smooth it out.  - Recommend using a battery operated nail file for better results.  - Start using an OTC nail strengthener that contains ingredients like " vitamin E, biotin, or aloe vera.  - Patient should send a MyChart message with a picture if the toe becomes red, inflamed or tender.  - If symptoms develop, patient should immediately start Epsom salt soaks.  HAIR AND NAIL HEALTH:  - Discussed benefits of biotin and other supplements for hair and nail health.              Future Appointments   Date Time Provider Department Center   7/24/2024 11:15 AM Daniel Hodges MD [facility] DERM [facility]   8/12/2024  9:50 AM Oskar Parekh OD [facility] OPHTHAL [facility]   1/2/2025 11:00 AM LABORATORY, [facility] LAB [facility]   1/8/2025 10:00 AM Sol Pineda PA-C [facility]        This note was generated with the assistance of ambient listening technology. Verbal consent was obtained by the patient and accompanying visitor(s) for the recording of patient appointment to facilitate this note. I attest to having reviewed and edited the generated note for accuracy, though some syntax or spelling errors may persist. Please contact the author of this note for any clarification.      Report Electronically Signed By:     Carolynn Bajwa DPM   Podiatry  [facility]  7/18/2024

## 2024-08-30 SDOH — ECONOMIC STABILITY: FOOD INSECURITY: WITHIN THE PAST 12 MONTHS, THE FOOD YOU BOUGHT JUST DIDN'T LAST AND YOU DIDN'T HAVE MONEY TO GET MORE.: SOMETIMES TRUE

## 2024-08-30 SDOH — ECONOMIC STABILITY: TRANSPORTATION INSECURITY
IN THE PAST 12 MONTHS, HAS LACK OF RELIABLE TRANSPORTATION KEPT YOU FROM MEDICAL APPOINTMENTS, MEETINGS, WORK OR FROM GETTING THINGS NEEDED FOR DAILY LIVING?: NO

## 2024-08-30 SDOH — ECONOMIC STABILITY: HOUSING INSECURITY: WHAT IS YOUR LIVING SITUATION TODAY?: I HAVE A STEADY PLACE TO LIVE

## 2024-08-30 SDOH — HEALTH STABILITY: PHYSICAL HEALTH: ON AVERAGE, HOW MANY MINUTES DO YOU ENGAGE IN EXERCISE AT THIS LEVEL?: 20 MIN

## 2024-08-30 SDOH — ECONOMIC STABILITY: HOUSING INSECURITY: DO YOU HAVE PROBLEMS WITH ANY OF THE FOLLOWING?: NONE OF THE ABOVE

## 2024-08-30 SDOH — SOCIAL STABILITY: SOCIAL NETWORK
HOW OFTEN DO YOU SEE OR TALK TO PEOPLE THAT YOU CARE ABOUT AND FEEL CLOSE TO? (FOR EXAMPLE: TALKING TO FRIENDS ON THE PHONE, VISITING FRIENDS OR FAMILY, GOING TO CHURCH OR CLUB MEETINGS): 5 OR MORE TIMES A WEEK

## 2024-08-30 SDOH — HEALTH STABILITY: PHYSICAL HEALTH: ON AVERAGE, HOW MANY DAYS PER WEEK DO YOU ENGAGE IN MODERATE TO STRENUOUS EXERCISE (LIKE A BRISK WALK)?: 7 DAYS

## 2024-08-30 ASSESSMENT — LIFESTYLE VARIABLES
HOW MANY STANDARD DRINKS CONTAINING ALCOHOL DO YOU HAVE ON A TYPICAL DAY: 0,1 OR 2
HOW OFTEN DO YOU HAVE A DRINK CONTAINING ALCOHOL: 4 OR MORE TIMES PER WEEK
AUDIT-C TOTAL SCORE: 6

## 2024-09-03 ENCOUNTER — APPOINTMENT (OUTPATIENT)
Dept: INTERNAL MEDICINE | Age: 34
End: 2024-09-03

## 2024-09-03 VITALS
WEIGHT: 170 LBS | HEART RATE: 78 BPM | OXYGEN SATURATION: 98 % | DIASTOLIC BLOOD PRESSURE: 83 MMHG | BODY MASS INDEX: 31.28 KG/M2 | TEMPERATURE: 98.4 F | SYSTOLIC BLOOD PRESSURE: 117 MMHG | HEIGHT: 62 IN

## 2024-09-03 DIAGNOSIS — L50.9 HIVES: ICD-10-CM

## 2024-09-03 DIAGNOSIS — F41.9 ANXIETY: Primary | ICD-10-CM

## 2024-09-03 PROCEDURE — 99214 OFFICE O/P EST MOD 30 MIN: CPT | Performed by: INTERNAL MEDICINE

## 2024-09-03 RX ORDER — ALPRAZOLAM 0.5 MG
0.5 TABLET ORAL 3 TIMES DAILY PRN
Qty: 30 TABLET | Refills: 0 | Status: SHIPPED | OUTPATIENT
Start: 2024-09-03

## 2024-09-03 RX ORDER — PREDNISONE 20 MG/1
20 TABLET ORAL
Qty: 10 TABLET | Refills: 0 | Status: SHIPPED | OUTPATIENT
Start: 2024-09-03

## 2024-09-03 RX ORDER — FLUOXETINE 10 MG/1
10 CAPSULE ORAL DAILY
Qty: 30 CAPSULE | Refills: 1 | Status: SHIPPED | OUTPATIENT
Start: 2024-09-03

## 2024-09-03 RX ORDER — CLOTRIMAZOLE AND BETAMETHASONE DIPROPIONATE 10; .64 MG/G; MG/G
1 CREAM TOPICAL 2 TIMES DAILY
Qty: 30 G | Refills: 0 | Status: SHIPPED | OUTPATIENT
Start: 2024-09-03

## 2024-09-03 RX ORDER — PANTOPRAZOLE SODIUM 40 MG/1
40 TABLET, DELAYED RELEASE ORAL 2 TIMES DAILY
Qty: 60 TABLET | Refills: 3 | Status: SHIPPED | OUTPATIENT
Start: 2024-09-03

## 2024-09-03 ASSESSMENT — PATIENT HEALTH QUESTIONNAIRE - PHQ9
CLINICAL INTERPRETATION OF PHQ2 SCORE: NO FURTHER SCREENING NEEDED
CLINICAL INTERPRETATION OF PHQ2 SCORE: NO FURTHER SCREENING NEEDED
SUM OF ALL RESPONSES TO PHQ9 QUESTIONS 1 AND 2: 0
1. LITTLE INTEREST OR PLEASURE IN DOING THINGS: NOT AT ALL
SUM OF ALL RESPONSES TO PHQ9 QUESTIONS 1 AND 2: 0
2. FEELING DOWN, DEPRESSED OR HOPELESS: NOT AT ALL
SUM OF ALL RESPONSES TO PHQ9 QUESTIONS 1 AND 2: 0
1. LITTLE INTEREST OR PLEASURE IN DOING THINGS: NOT AT ALL
SUM OF ALL RESPONSES TO PHQ9 QUESTIONS 1 AND 2: 0
2. FEELING DOWN, DEPRESSED OR HOPELESS: NOT AT ALL

## 2024-09-05 ENCOUNTER — OFFICE VISIT (OUTPATIENT)
Dept: URGENT CARE | Facility: CLINIC | Age: 34
End: 2024-09-05
Payer: COMMERCIAL

## 2024-09-05 VITALS
SYSTOLIC BLOOD PRESSURE: 102 MMHG | DIASTOLIC BLOOD PRESSURE: 66 MMHG | HEART RATE: 108 BPM | WEIGHT: 145 LBS | RESPIRATION RATE: 20 BRPM | HEIGHT: 63 IN | OXYGEN SATURATION: 97 % | BODY MASS INDEX: 25.69 KG/M2 | TEMPERATURE: 99 F

## 2024-09-05 DIAGNOSIS — J06.9 UPPER RESPIRATORY TRACT INFECTION, UNSPECIFIED TYPE: ICD-10-CM

## 2024-09-05 DIAGNOSIS — J02.9 SORE THROAT: Primary | ICD-10-CM

## 2024-09-05 LAB
CTP QC/QA: YES
CTP QC/QA: YES
MOLECULAR STREP A: NEGATIVE
SARS-COV-2 AG RESP QL IA.RAPID: NEGATIVE

## 2024-09-05 NOTE — LETTER
September 5, 2024      [facility]  [address], [address]  [place] [address]  Phone: [phone]       Patient: Terri Tavera   YOB: 1990  Date of Visit: 09/05/2024    To Whom It May Concern:    Apple Tavera  was at [facility] on 09/05/2024. The patient may return to work/school on 09/07/2024with no restrictions. If you have any questions or concerns, or if I can be of further assistance, please do not hesitate to contact me.    Sincerely,     CONI Bass

## 2024-09-05 NOTE — PROGRESS NOTES
"Subjective:      Patient ID: Terri Tavera is a 34 y.o. female.    Vitals:  height is 5' 3" (1.6 m) and weight is 65.8 kg (145 lb). Her oral temperature is 99 °F (37.2 °C). Her blood pressure is 102/66 and her pulse is 108. Her respiration is 20 and oxygen saturation is 97%.     Chief Complaint: Sinus Problem    Patient present for sinus problem. Symptoms started Tuesday including, chills, headache, sore throat,swollen glands and fever of 100.9, lasing less than a day. Administered Advil and Tylenol.     Sinus Problem  This is a new problem. The current episode started in the past 7 days. The problem is unchanged. There has been no fever. The fever has been present for Less than 1 day. Her pain is at a severity of 4/10. The pain is mild. Associated symptoms include chills, headaches, a sore throat and swollen glands. Pertinent negatives include no congestion, coughing, diaphoresis, ear pain, hoarse voice, neck pain, shortness of breath, sinus pressure or sneezing. Past treatments include acetaminophen. The treatment provided mild relief.       Constitution: Positive for chills. Negative for sweating.   HENT:  Positive for sore throat. Negative for ear pain, congestion and sinus pressure.    Neck: Negative for neck pain.   Respiratory:  Negative for cough and shortness of breath.    Allergic/Immunologic: Negative for sneezing.   Neurological:  Positive for headaches.      Objective:     Physical Exam   Constitutional: She is oriented to person, place, and time. She appears well-developed. She is cooperative.  Non-toxic appearance. She does not appear ill. No distress.   HENT:   Head: Normocephalic and atraumatic.   Ears:   Right Ear: Hearing, tympanic membrane, external ear and ear canal normal.   Left Ear: Hearing, tympanic membrane, external ear and ear canal normal.   Nose: Nose normal. No mucosal edema, rhinorrhea or nasal deformity. No epistaxis. Right sinus exhibits no maxillary sinus tenderness and no frontal " sinus tenderness. Left sinus exhibits no maxillary sinus tenderness and no frontal sinus tenderness.   Mouth/Throat: Uvula is midline and mucous membranes are normal. No trismus in the jaw. Normal dentition. No uvula swelling. Posterior oropharyngeal edema and posterior oropharyngeal erythema present. No oropharyngeal exudate.   Eyes: Conjunctivae and lids are normal. No scleral icterus.   Neck: Trachea normal and phonation normal. Neck supple. No edema present. No erythema present. No neck rigidity present.   Cardiovascular: Normal rate, regular rhythm, normal heart sounds and normal pulses.   Pulmonary/Chest: Effort normal and breath sounds normal. No respiratory distress. She has no decreased breath sounds. She has no rhonchi.   Abdominal: Normal appearance.   Musculoskeletal: Normal range of motion.         General: No deformity. Normal range of motion.   Neurological: She is alert and oriented to person, place, and time. She exhibits normal muscle tone. Coordination normal.   Skin: Skin is warm, dry, intact, not diaphoretic and not pale.   Psychiatric: Her speech is normal and behavior is normal. Judgment and thought content normal.   Nursing note and vitals reviewed.      Assessment:     1. Sore throat    2. Upper respiratory tract infection, unspecified type        Plan:       Sore throat  -     SARS Coronavirus 2 Antigen, POCT Manual Read  -     POCT Strep A, Molecular    Upper respiratory tract infection, unspecified type      Patient counseled on symptomatic treatment.   - Rest  - Hydration-- 64 ounces fluid per day  - Cool mist humidifier/vaporizer  - Nasal saline/steroids  - Antihistamines  - Mucinex  - Gargles and lozenges for sore throat  - OTC pain/fever relievers    Follow up with PCP or ER immediately for worsening, new symptoms or no improvement. Go to ER if you develop fever of 103 or higher, chest pain, shortness of breath, upper back pain, stiff neck or severe headache.      Diagnosis,  treatment, AVS reviewed with patient. Patient understands and agrees with plan

## 2024-10-15 ENCOUNTER — APPOINTMENT (OUTPATIENT)
Dept: INTERNAL MEDICINE | Age: 34
End: 2024-10-15

## 2024-10-15 VITALS
HEIGHT: 62 IN | OXYGEN SATURATION: 99 % | SYSTOLIC BLOOD PRESSURE: 126 MMHG | WEIGHT: 170 LBS | DIASTOLIC BLOOD PRESSURE: 78 MMHG | HEART RATE: 86 BPM | BODY MASS INDEX: 31.28 KG/M2

## 2024-10-15 DIAGNOSIS — F41.9 ANXIETY: Primary | ICD-10-CM

## 2024-10-15 PROCEDURE — 99213 OFFICE O/P EST LOW 20 MIN: CPT | Performed by: INTERNAL MEDICINE

## 2024-10-15 ASSESSMENT — PATIENT HEALTH QUESTIONNAIRE - PHQ9
1. LITTLE INTEREST OR PLEASURE IN DOING THINGS: NOT AT ALL
2. FEELING DOWN, DEPRESSED OR HOPELESS: NOT AT ALL
CLINICAL INTERPRETATION OF PHQ2 SCORE: NO FURTHER SCREENING NEEDED
SUM OF ALL RESPONSES TO PHQ9 QUESTIONS 1 AND 2: 0
SUM OF ALL RESPONSES TO PHQ9 QUESTIONS 1 AND 2: 0

## 2024-10-16 ENCOUNTER — OFFICE VISIT (OUTPATIENT)
Dept: URGENT CARE | Facility: CLINIC | Age: 34
End: 2024-10-16
Payer: COMMERCIAL

## 2024-10-16 ENCOUNTER — TELEPHONE (OUTPATIENT)
Dept: PHARMACY | Facility: CLINIC | Age: 34
End: 2024-10-16
Payer: COMMERCIAL

## 2024-10-16 VITALS
OXYGEN SATURATION: 99 % | HEIGHT: 63 IN | TEMPERATURE: 98 F | DIASTOLIC BLOOD PRESSURE: 61 MMHG | BODY MASS INDEX: 25.69 KG/M2 | RESPIRATION RATE: 20 BRPM | HEART RATE: 100 BPM | SYSTOLIC BLOOD PRESSURE: 107 MMHG | WEIGHT: 145 LBS

## 2024-10-16 DIAGNOSIS — R05.9 COUGH, UNSPECIFIED TYPE: ICD-10-CM

## 2024-10-16 DIAGNOSIS — J40 BRONCHITIS: ICD-10-CM

## 2024-10-16 DIAGNOSIS — J01.00 ACUTE NON-RECURRENT MAXILLARY SINUSITIS: Primary | ICD-10-CM

## 2024-10-16 LAB
CTP QC/QA: YES
SARS-COV-2 AG RESP QL IA.RAPID: NEGATIVE

## 2024-10-16 PROCEDURE — 96372 THER/PROPH/DIAG INJ SC/IM: CPT | Mod: S$GLB,,, | Performed by: EMERGENCY MEDICINE

## 2024-10-16 PROCEDURE — 87811 SARS-COV-2 COVID19 W/OPTIC: CPT | Mod: QW,S$GLB,, | Performed by: PHYSICIAN ASSISTANT

## 2024-10-16 PROCEDURE — 99213 OFFICE O/P EST LOW 20 MIN: CPT | Mod: 25,S$GLB,, | Performed by: PHYSICIAN ASSISTANT

## 2024-10-16 RX ORDER — DOXYCYCLINE 100 MG/1
100 CAPSULE ORAL 2 TIMES DAILY
Qty: 14 CAPSULE | Refills: 0 | Status: SHIPPED | OUTPATIENT
Start: 2024-10-16 | End: 2024-10-23

## 2024-10-16 RX ORDER — BETAMETHASONE SODIUM PHOSPHATE AND BETAMETHASONE ACETATE 3; 3 MG/ML; MG/ML
6 INJECTION, SUSPENSION INTRA-ARTICULAR; INTRALESIONAL; INTRAMUSCULAR; SOFT TISSUE
Status: COMPLETED | OUTPATIENT
Start: 2024-10-16 | End: 2024-10-16

## 2024-10-16 RX ADMIN — BETAMETHASONE SODIUM PHOSPHATE AND BETAMETHASONE ACETATE 6 MG: 3; 3 INJECTION, SUSPENSION INTRA-ARTICULAR; INTRALESIONAL; INTRAMUSCULAR; SOFT TISSUE at 07:10

## 2024-10-16 NOTE — PROGRESS NOTES
"Subjective:      Patient ID: Terri Tavera is a 34 y.o. female.    Vitals:  height is 5' 3" (1.6 m) and weight is 65.8 kg (145 lb). Her oral temperature is 98.2 °F (36.8 °C). Her blood pressure is 107/61 and her pulse is 100. Her respiration is 20 and oxygen saturation is 99%.     Chief Complaint: Cough    C/o cough, sore throat, headache and congestion, x over 1 month, rates pain 4/10, pt was seen 09/05/2024 with similar symptoms, pt has taken OTC meds with some relief    Cough  This is a new problem. The current episode started 1 to 4 weeks ago. The problem occurs constantly. The cough is Non-productive. Associated symptoms include headaches, nasal congestion and a sore throat. Pertinent negatives include no chest pain, chills, ear congestion, ear pain, fever, heartburn, hemoptysis, myalgias, postnasal drip, rash, rhinorrhea, shortness of breath, sweats, weight loss or wheezing. Nothing aggravates the symptoms. She has tried OTC cough suppressant for the symptoms. The treatment provided mild relief. There is no history of asthma, bronchiectasis, bronchitis, COPD, emphysema, environmental allergies or pneumonia.       Constitution: Negative for chills and fever.   HENT:  Positive for sore throat. Negative for ear pain and postnasal drip.    Cardiovascular:  Negative for chest pain.   Respiratory:  Positive for cough. Negative for bloody sputum, shortness of breath and wheezing.    Gastrointestinal:  Negative for heartburn.   Musculoskeletal:  Negative for muscle ache.   Skin:  Negative for rash.   Allergic/Immunologic: Negative for environmental allergies.   Neurological:  Positive for headaches.      Objective:     Physical Exam   HENT:   Head: Normocephalic.   Ears:   Right Ear: Tympanic membrane normal.   Left Ear: Tympanic membrane normal.   Nose: No rhinorrhea or congestion.   Cardiovascular: Normal rate.   Pulmonary/Chest: Effort normal. No stridor. No respiratory distress. She has no wheezes. She has no " rhonchi. She has no rales.   Abdominal: Normal appearance. She exhibits no distension. There is no abdominal tenderness. There is no guarding.   Neurological: She is alert.   Nursing note and vitals reviewed.      Assessment:     1. Acute non-recurrent maxillary sinusitis    2. Cough, unspecified type    3. Bronchitis      Here with cough, congestion, body aches and fatigue. She has been suffering from this for 2 weeks. She is negative for covid. She is suffering from bronchitis and acute maxillary sinusitis. She was given celestone for bronchitis and doxy for her sinus infection. I also recommended mucinex.She was instructed to hydrate and return to the clinic if new or worsening symptoms occur.    Plan:       Acute non-recurrent maxillary sinusitis  -     doxycycline (VIBRAMYCIN) 100 MG Cap; Take 1 capsule (100 mg total) by mouth 2 (two) times daily. for 7 days  Dispense: 14 capsule; Refill: 0    Cough, unspecified type  -     SARS Coronavirus 2 Antigen, POCT Manual Read    Bronchitis  -     betamethasone acetate-betamethasone sodium phosphate injection 6 mg

## 2024-10-17 ENCOUNTER — TELEPHONE (OUTPATIENT)
Dept: URGENT CARE | Facility: CLINIC | Age: 34
End: 2024-10-17
Payer: COMMERCIAL

## 2024-10-17 NOTE — TELEPHONE ENCOUNTER
Spoke with patient. Pt was seen on yesterday and prescribed an antibiotic and given a steroid shot. Pt called today stating that she is still having a really bad cough, I explained to patient to give the antibiotic a couple days to get into system and if she is not feeling well tomorrow or Saturday to please come back in to be seen.    ----- Message from Kirsten sent at 10/17/2024 12:04 PM CDT -----  Contact: [phone] pt  .[email or web address]     Patient is calling for Medical Advice regarding:Requesting a call back to discuss continued care, pt states she has a severe cough    How long has patient had these symptoms:    Pharmacy name and phone#:  [id number] - [place], [place] - [address] [address] AT [address] & [facility]  [address] [address] [address]  Phone: [phone] Fax: [phone]       Patient wants a call back or thru [facility]:call back    Comments:Please call and advise    Please advise patient replies from provider may take up to 48 hours.

## 2024-10-17 NOTE — TELEPHONE ENCOUNTER
[facility] Chart Review & Patient Outreach Details For Medication Adherence Project    Reason for Outreach Encounter: 3rd Party payor non-compliance report ([employer], [employer], C, etc)  2.  Patient Outreach Method: Reviewed patient chart   3.   Medication in question:   Diabetes Medications               tirzepatide (MOUNJARO) 5 mg/0.5 mL PnIj Inject 5 mg into the skin every 7 days.                 Moounjaro  last filled  9/26/24 for 28 day supply      4.  Reviewed and or Updates Made To: Patient Chart  5. Outreach Outcomes and/or actions taken: Patient filled medication and is on track to be adherent  Additional Notes:

## 2024-11-06 ENCOUNTER — WALK IN (OUTPATIENT)
Dept: URGENT CARE | Age: 34
End: 2024-11-06

## 2024-11-06 ENCOUNTER — APPOINTMENT (OUTPATIENT)
Dept: LAB | Age: 34
End: 2024-11-06

## 2024-11-06 ENCOUNTER — OFFICE VISIT (OUTPATIENT)
Dept: CARDIOLOGY | Age: 34
End: 2024-11-06
Attending: PHYSICIAN ASSISTANT

## 2024-11-06 VITALS
OXYGEN SATURATION: 100 % | HEART RATE: 98 BPM | TEMPERATURE: 98 F | SYSTOLIC BLOOD PRESSURE: 157 MMHG | DIASTOLIC BLOOD PRESSURE: 82 MMHG | RESPIRATION RATE: 18 BRPM

## 2024-11-06 DIAGNOSIS — R10.9 ABDOMINAL PAIN, UNSPECIFIED ABDOMINAL LOCATION: ICD-10-CM

## 2024-11-06 DIAGNOSIS — G43.909 MIGRAINE: Primary | ICD-10-CM

## 2024-11-06 DIAGNOSIS — N83.209 CYST OF OVARY, UNSPECIFIED LATERALITY: Primary | ICD-10-CM

## 2024-11-06 DIAGNOSIS — R55 SYNCOPE AND COLLAPSE: Primary | ICD-10-CM

## 2024-11-06 DIAGNOSIS — R55 SYNCOPE, UNSPECIFIED SYNCOPE TYPE: Primary | ICD-10-CM

## 2024-11-06 LAB
ALBUMIN SERPL-MCNC: 4.3 G/DL (ref 3.4–5)
ALBUMIN/GLOB SERPL: 1.3 {RATIO} (ref 1–2.4)
ALP SERPL-CCNC: 55 UNITS/L (ref 45–117)
ALT SERPL-CCNC: 22 UNITS/L
ANION GAP SERPL CALC-SCNC: 8 MMOL/L (ref 7–19)
APPEARANCE, POC: CLEAR
AST SERPL-CCNC: 24 UNITS/L
B-HCG UR QL: NEGATIVE
BASOPHILS # BLD: 0 K/MCL (ref 0–0.3)
BASOPHILS NFR BLD: 0 %
BILIRUB SERPL-MCNC: 0.7 MG/DL (ref 0.2–1)
BILIRUB UR QL STRIP: NEGATIVE
BUN SERPL-MCNC: 11 MG/DL (ref 6–20)
BUN/CREAT SERPL: 18 (ref 7–25)
CALCIUM SERPL-MCNC: 9.6 MG/DL (ref 8.4–10.2)
CHLORIDE SERPL-SCNC: 108 MMOL/L (ref 97–110)
CO2 SERPL-SCNC: 26 MMOL/L (ref 21–32)
COLOR UR: YELLOW
CREAT SERPL-MCNC: 0.6 MG/DL (ref 0.51–0.95)
DEPRECATED RDW RBC: 42.1 FL (ref 39–50)
EGFRCR SERPLBLD CKD-EPI 2021: >90 ML/MIN/{1.73_M2}
EOSINOPHIL # BLD: 0.1 K/MCL (ref 0–0.5)
EOSINOPHIL NFR BLD: 1 %
ERYTHROCYTE [DISTWIDTH] IN BLOOD: 11.9 % (ref 11–15)
FASTING DURATION TIME PATIENT: 12 HOURS (ref 0–999)
FLUAV RNA RESP QL NAA+PROBE: NOT DETECTED
FLUBV RNA RESP QL NAA+PROBE: NOT DETECTED
GLOBULIN SER-MCNC: 3.4 G/DL (ref 2–4)
GLUCOSE SERPL-MCNC: 103 MG/DL (ref 70–99)
GLUCOSE UR-MCNC: NEGATIVE MG/DL
HCT VFR BLD CALC: 41.8 % (ref 36–46.5)
HGB BLD-MCNC: 13.9 G/DL (ref 12–15.5)
HGB UR QL STRIP: NEGATIVE
IMM GRANULOCYTES # BLD AUTO: 0 K/MCL (ref 0–0.2)
IMM GRANULOCYTES # BLD: 0 %
INTERNAL PROCEDURAL CONTROLS ACCEPTABLE: YES
KETONES UR STRIP-MCNC: NEGATIVE MG/DL
LEUKOCYTE ESTERASE UR QL STRIP: NEGATIVE
LYMPHOCYTES # BLD: 1.9 K/MCL (ref 1–4.8)
LYMPHOCYTES NFR BLD: 27 %
MCH RBC QN AUTO: 31.2 PG (ref 26–34)
MCHC RBC AUTO-ENTMCNC: 33.3 G/DL (ref 32–36.5)
MCV RBC AUTO: 93.9 FL (ref 78–100)
MONOCYTES # BLD: 0.5 K/MCL (ref 0.3–0.9)
MONOCYTES NFR BLD: 7 %
NEUTROPHILS # BLD: 4.6 K/MCL (ref 1.8–7.7)
NEUTROPHILS NFR BLD: 65 %
NITRITE UR QL STRIP: NEGATIVE
PH UR: 7 [PH] (ref 5–7)
PLATELET # BLD AUTO: 262 K/MCL (ref 140–450)
POTASSIUM SERPL-SCNC: 3.8 MMOL/L (ref 3.4–5.1)
PROT SERPL-MCNC: 7.7 G/DL (ref 6.4–8.2)
PROT UR-MCNC: NEGATIVE MG/DL
RAINBOW EXTRA TUBES HOLD SPECIMEN: NORMAL
RBC # BLD: 4.45 MIL/MCL (ref 4–5.2)
SARS-COV-2 RNA RESP QL NAA+PROBE: NOT DETECTED
SERVICE CMNT-IMP: NORMAL
SERVICE CMNT-IMP: NORMAL
SODIUM SERPL-SCNC: 138 MMOL/L (ref 135–145)
SP GR UR: 1.01 (ref 1–1.03)
TEST LOT EXPIRATION DATE: NORMAL
TEST LOT NUMBER: NORMAL
UROBILINOGEN UR-MCNC: 0.2 MG/DL (ref 0–1)
WBC # BLD: 7.2 K/MCL (ref 4.2–11)

## 2024-11-06 PROCEDURE — 81025 URINE PREGNANCY TEST: CPT | Performed by: PHYSICIAN ASSISTANT

## 2024-11-06 PROCEDURE — 81003 URINALYSIS AUTO W/O SCOPE: CPT | Performed by: PHYSICIAN ASSISTANT

## 2024-11-06 PROCEDURE — 99214 OFFICE O/P EST MOD 30 MIN: CPT | Performed by: PHYSICIAN ASSISTANT

## 2024-11-06 PROCEDURE — 93000 ELECTROCARDIOGRAM COMPLETE: CPT | Performed by: PHYSICIAN ASSISTANT

## 2024-11-08 ENCOUNTER — TELEPHONE (OUTPATIENT)
Dept: URGENT CARE | Age: 34
End: 2024-11-08

## 2024-11-08 LAB
C TRACH RRNA SPEC QL NAA+PROBE: ABNORMAL
Lab: ABNORMAL
N GONORRHOEA RRNA SPEC QL NAA+PROBE: ABNORMAL
SERVICE CMNT-IMP: ABNORMAL
T VAGINALIS RRNA SPEC QL NAA+PROBE: NEGATIVE

## 2024-11-10 ENCOUNTER — TELEPHONE (OUTPATIENT)
Dept: PHARMACY | Facility: CLINIC | Age: 34
End: 2024-11-10
Payer: COMMERCIAL

## 2024-11-11 ENCOUNTER — TELEPHONE (OUTPATIENT)
Dept: INTERNAL MEDICINE | Facility: CLINIC | Age: 34
End: 2024-11-11
Payer: COMMERCIAL

## 2024-11-11 NOTE — TELEPHONE ENCOUNTER
Attempted to call the pt to reschedule appt due to the provider not in clinic. No answer lvm stating appt for today will be canceled give clinic a call back to reschedule.\\AR

## 2024-11-11 NOTE — TELEPHONE ENCOUNTER
[facility] Chart Review & Patient Outreach Details For Medication Adherence Project    Reason for Outreach Encounter: 3rd Party payor non-compliance report ([employer], [employer], [employer], etc)  2.  Patient Outreach Method: Reviewed patient chart  and [employer] message  3.   Medication in question:   Diabetes Medications               tirzepatide (MOUNJARO) 5 mg/0.5 mL PnIj Inject 5 mg into the skin every 7 days.                 MOunjaro  last filled  9/26/24 for 28 day supply      4.  Reviewed and or Updates Made To: Patient Chart  5. Outreach Outcomes and/or actions taken: Sent inquiry to patient: Waiting for response and Patient reminded to  prescription  Additional Notes:

## 2024-11-14 ENCOUNTER — OFFICE VISIT (OUTPATIENT)
Dept: INTERNAL MEDICINE | Facility: CLINIC | Age: 34
End: 2024-11-14
Payer: COMMERCIAL

## 2024-11-14 VITALS
TEMPERATURE: 96 F | HEART RATE: 87 BPM | OXYGEN SATURATION: 99 % | WEIGHT: 148.81 LBS | BODY MASS INDEX: 26.37 KG/M2 | DIASTOLIC BLOOD PRESSURE: 70 MMHG | SYSTOLIC BLOOD PRESSURE: 118 MMHG | HEIGHT: 63 IN

## 2024-11-14 DIAGNOSIS — K52.9 GASTROENTERITIS: ICD-10-CM

## 2024-11-14 DIAGNOSIS — H00.011 HORDEOLUM EXTERNUM OF RIGHT UPPER EYELID: Primary | ICD-10-CM

## 2024-11-14 DIAGNOSIS — J06.9 VIRAL URI WITH COUGH: ICD-10-CM

## 2024-11-14 PROCEDURE — 3066F NEPHROPATHY DOC TX: CPT | Mod: CPTII,S$GLB,, | Performed by: PHYSICIAN ASSISTANT

## 2024-11-14 PROCEDURE — 3061F NEG MICROALBUMINURIA REV: CPT | Mod: CPTII,S$GLB,, | Performed by: PHYSICIAN ASSISTANT

## 2024-11-14 PROCEDURE — 99999 PR PBB SHADOW E&M-EST. PATIENT-LVL IV: CPT | Mod: PBBFAC,,, | Performed by: PHYSICIAN ASSISTANT

## 2024-11-14 PROCEDURE — 3044F HG A1C LEVEL LT 7.0%: CPT | Mod: CPTII,S$GLB,, | Performed by: PHYSICIAN ASSISTANT

## 2024-11-14 PROCEDURE — 99214 OFFICE O/P EST MOD 30 MIN: CPT | Mod: S$GLB,,, | Performed by: PHYSICIAN ASSISTANT

## 2024-11-14 PROCEDURE — 3008F BODY MASS INDEX DOCD: CPT | Mod: CPTII,S$GLB,, | Performed by: PHYSICIAN ASSISTANT

## 2024-11-14 PROCEDURE — 3078F DIAST BP <80 MM HG: CPT | Mod: CPTII,S$GLB,, | Performed by: PHYSICIAN ASSISTANT

## 2024-11-14 PROCEDURE — 1159F MED LIST DOCD IN RCRD: CPT | Mod: CPTII,S$GLB,, | Performed by: PHYSICIAN ASSISTANT

## 2024-11-14 PROCEDURE — 1160F RVW MEDS BY RX/DR IN RCRD: CPT | Mod: CPTII,S$GLB,, | Performed by: PHYSICIAN ASSISTANT

## 2024-11-14 PROCEDURE — 3074F SYST BP LT 130 MM HG: CPT | Mod: CPTII,S$GLB,, | Performed by: PHYSICIAN ASSISTANT

## 2024-11-14 RX ORDER — ERYTHROMYCIN 5 MG/G
OINTMENT OPHTHALMIC NIGHTLY
Qty: 1 G | Refills: 0 | Status: SHIPPED | OUTPATIENT
Start: 2024-11-14 | End: 2024-11-24

## 2024-11-14 NOTE — PROGRESS NOTES
Subjective:      Patient ID: Terri Tavera is a 34 y.o. female.    Chief Complaint: Abdominal Pain    HPI  History of Present Illness    CHIEF COMPLAINT:  Ms. Tavera presents today for abdominal pain and respiratory symptoms.    GASTROINTESTINAL ISSUES:  She recently experienced epigastric and generalized abdominal cramping, described as a painful pins and needles sensation, accompanied by diarrhea. These symptoms have since resolved. She denies blood in stool, mucus, or current nausea. Her appetite and diet have returned to normal. She expresses uncertainty about appropriate food choices.    MOUNJARO USE:  She had been taking Mounjaro for weight loss consistently for a few months but discontinued it 2-3 weeks ago due to concerns about abdominal issues. Approximately 2 days after her last dose, she experienced abdominal pain and diarrhea that persisted for about 5 days before resolving.    UPPER RESPIRATORY INFECTION:  She reports upper respiratory symptoms that began 3 days ago, including congestion, nasal pressure, and a dry cough. She denies fever, body aches, chills, sweats, itchy or watery eyes, runny nose, and sneezing. This is her fourth upper respiratory infection since September. The first episode involved a sore throat and fever, for which she was tested for strep at an urgent care facility.    MEDICATIONS:  For her current respiratory symptoms, she has been taking Delsym, Mucinex D, vitamin C, Advil, and Tylenol. She previously used Zofran, Pepcid, Mylanta, and Prevacid for GI symptoms, as well as Imodium for diarrhea.    OVARIAN CYSTS:  She reports having four ovarian cysts on her left ovary, discovered during a transvaginal ultrasound at a women's health appointment. A follow-up visit is scheduled for the 21st to address this issue.    EYE CONDITION:  She developed a stye yesterday on her right upper eye lid and has been treating it with stye ointment and warm compresses.    Medications were  reviewed      Delsym, mucinex D, vitamin C, advil, tylenol. Congestion, facial pressure, cough (dry)  Stye of right eye. 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)    Type 2 diabetes mellitus with hyperglycemia, without long-term current use of insulin    History of acute lymphoblastic leukemia (ALL)         Current Outpatient Medications:     ALPRAZolam (XANAX) 0.25 MG tablet, Take 0.25 mg by mouth., Disp: , Rfl:     citalopram (CELEXA) 40 MG tablet, Take 40 mg by mouth once daily., Disp: , Rfl:     clonazePAM (KLONOPIN) 1 MG tablet, Take 1 mg by mouth 2 (two) times daily as needed for Anxiety., Disp: , Rfl:     DULoxetine (CYMBALTA) 20 MG capsule, Take 20 mg by mouth once daily., Disp: , Rfl:     fluocinonide (LIDEX) 0.05 % external solution, Apply to scalp once daily, Disp: 60 mL, Rfl: 5    levonorgestreL (MIRENA) 21 mcg/24 hours (8 yrs) 52 mg IUD, 1 each by Intrauterine route once., Disp: , Rfl:     erythromycin (ROMYCIN) ophthalmic ointment, Place into the right eye every evening. for 10 days, Disp: 1 g, Rfl: 0    tirzepatide (MOUNJARO) 5 mg/0.5 mL PnIj, Inject 5 mg into the skin every 7 days. (Patient not taking: Reported on 11/14/2024), Disp: 4 Pen, Rfl: 5    Review of Systems   Constitutional:  Negative for activity change, appetite change, chills, diaphoresis, fatigue, fever and unexpected weight change.   HENT:  Positive for congestion, postnasal drip, rhinorrhea, sinus pressure and sinus pain. Negative for hearing loss and trouble swallowing.    Eyes:  Positive for redness. Negative for photophobia, pain, discharge, itching and visual disturbance.   Respiratory:  Positive for cough. Negative for choking, chest tightness, shortness of breath, wheezing and stridor.    Cardiovascular:  Negative for chest pain and palpitations.   Gastrointestinal:  Positive for abdominal  "distention, abdominal pain, diarrhea and nausea. Negative for blood in stool, constipation and vomiting.   Endocrine: Negative for polydipsia and polyuria.   Genitourinary:  Negative for difficulty urinating, dysuria, hematuria and menstrual problem.   Musculoskeletal:  Negative for arthralgias, joint swelling and neck pain.   Neurological:  Negative for weakness and headaches.   Psychiatric/Behavioral:  Negative for confusion and dysphoric mood.      Objective:   /70 (BP Location: Left arm, Patient Position: Sitting)   Pulse 87   Temp 96.3 °F (35.7 °C) (Tympanic)   Ht 5' 3" (1.6 m)   Wt 67.5 kg (148 lb 13 oz)   SpO2 99%   BMI 26.36 kg/m²     Physical Exam  Vitals and nursing note reviewed.   Constitutional:       General: She is not in acute distress.     Appearance: Normal appearance. She is well-developed. She is not ill-appearing, toxic-appearing or diaphoretic.   HENT:      Head: Normocephalic and atraumatic.      Right Ear: Hearing, tympanic membrane, ear canal and external ear normal. No tenderness.      Left Ear: Hearing, tympanic membrane, ear canal and external ear normal. No tenderness.      Nose: Mucosal edema, congestion and rhinorrhea present.      Right Sinus: Maxillary sinus tenderness and frontal sinus tenderness present.      Left Sinus: Maxillary sinus tenderness and frontal sinus tenderness present.      Mouth/Throat:      Mouth: Mucous membranes are moist. No oral lesions.      Dentition: Normal dentition. No dental caries or dental abscesses.      Tongue: No lesions.      Palate: No lesions.      Pharynx: Uvula midline. Oropharyngeal exudate and posterior oropharyngeal erythema present. No pharyngeal swelling or uvula swelling.      Tonsils: No tonsillar exudate or tonsillar abscesses.   Eyes:      General:         Right eye: Hordeolum present. No discharge.         Left eye: No discharge.      Extraocular Movements: Extraocular movements intact.      Conjunctiva/sclera:      Right " eye: Right conjunctiva is injected.      Pupils: Pupils are equal, round, and reactive to light.        Comments: Pustule noted on hodeolum. Not draining   Cardiovascular:      Rate and Rhythm: Normal rate and regular rhythm.      Heart sounds: Normal heart sounds. No murmur heard.     No friction rub. No gallop.   Pulmonary:      Effort: Pulmonary effort is normal. No respiratory distress.      Breath sounds: Normal breath sounds. No wheezing or rales.   Abdominal:      General: Abdomen is flat. Bowel sounds are normal.      Palpations: Abdomen is soft.      Tenderness: There is no abdominal tenderness. There is no guarding or rebound.   Musculoskeletal:      Cervical back: Normal range of motion and neck supple.   Lymphadenopathy:      Cervical: No cervical adenopathy.   Skin:     General: Skin is warm.      Coloration: Skin is not pale.      Findings: No erythema or rash.   Neurological:      Mental Status: She is alert and oriented to person, place, and time.   Psychiatric:         Mood and Affect: Mood normal.         Behavior: Behavior normal.         Thought Content: Thought content normal.         Judgment: Judgment normal.         Assessment:     1. Hordeolum externum of right upper eyelid    2. Viral URI with cough    3. Gastroenteritis      Plan:   Assessed recent abdominal pain and diarrhea as likely gastroenteritis, possibly related to Mounjaro use  Evaluated current upper respiratory symptoms as viral in nature  Observed stye on eyelid, recommended treatment plan  Considered steroid injection for symptom relief, opted for conservative management with OTC medications    Hordeolum externum of right upper eyelid  -     erythromycin (ROMYCIN) ophthalmic ointment; Place into the right eye every evening. for 10 days  Dispense: 1 g; Refill: 0  Discussed the mechanism of stye formation and proper treatment techniques.  Ms. aTvera to apply warm compresses to stye.  Ms. Tavera to use lid scrubs to help open  clogged eyelid duct.  Started erythromycin ophthalmic ointment for stye, apply to affected area 1-3 times daily.    Viral URI with cough  Educated on the peak and duration of viral respiratory infections.  Recommend resting as much as possible while managing viral symptoms.  Recommend switching from Mucinex to Zyrtec-D (cetirizine with pseudoephedrine) for congestion.  Continued Delsym for cough.  Contact office via [employer] message on Monday if respiratory symptoms worsen.  Recommend alternating ibuprofen and Tylenol for pain relief.    Gastroenteritis  GASTROENTERITIS:  Explained the nature of gastroenteritis and its typical progression.  -resolved. Contact clinic if symptoms come back    OVARIAN CYST FOLLOW-UP:  Follow up on the 21st as scheduled regarding ovarian cysts.         Follow up if symptoms worsen or fail to improve.

## 2024-11-18 ENCOUNTER — APPOINTMENT (OUTPATIENT)
Dept: OBGYN | Age: 34
End: 2024-11-18

## 2024-11-18 VITALS
DIASTOLIC BLOOD PRESSURE: 64 MMHG | BODY MASS INDEX: 30.59 KG/M2 | HEIGHT: 62 IN | SYSTOLIC BLOOD PRESSURE: 110 MMHG | WEIGHT: 166.2 LBS

## 2024-11-18 DIAGNOSIS — Z87.892 HISTORY OF ANAPHYLAXIS: ICD-10-CM

## 2024-11-18 DIAGNOSIS — Z87.42 HISTORY OF ABNORMAL CERVICAL PAP SMEAR: ICD-10-CM

## 2024-11-18 DIAGNOSIS — Z86.19 HISTORY OF HPV INFECTION: ICD-10-CM

## 2024-11-18 DIAGNOSIS — Z11.3 SCREEN FOR STD (SEXUALLY TRANSMITTED DISEASE): ICD-10-CM

## 2024-11-18 DIAGNOSIS — Z12.4 PAP SMEAR FOR CERVICAL CANCER SCREENING: ICD-10-CM

## 2024-11-18 DIAGNOSIS — Z01.419 ENCOUNTER FOR GYNECOLOGICAL EXAMINATION WITHOUT ABNORMAL FINDING: Primary | ICD-10-CM

## 2024-11-18 PROCEDURE — 87491 CHLMYD TRACH DNA AMP PROBE: CPT | Performed by: CLINICAL MEDICAL LABORATORY

## 2024-11-18 PROCEDURE — 87661 TRICHOMONAS VAGINALIS AMPLIF: CPT | Performed by: CLINICAL MEDICAL LABORATORY

## 2024-11-18 PROCEDURE — 87624 HPV HI-RISK TYP POOLED RSLT: CPT | Performed by: CLINICAL MEDICAL LABORATORY

## 2024-11-18 PROCEDURE — 99385 PREV VISIT NEW AGE 18-39: CPT | Performed by: OBSTETRICS & GYNECOLOGY

## 2024-11-18 PROCEDURE — 87591 N.GONORRHOEAE DNA AMP PROB: CPT | Performed by: CLINICAL MEDICAL LABORATORY

## 2024-11-18 PROCEDURE — 99459 PELVIC EXAMINATION: CPT | Performed by: OBSTETRICS & GYNECOLOGY

## 2024-11-18 PROCEDURE — 88175 CYTOPATH C/V AUTO FLUID REDO: CPT | Performed by: CLINICAL MEDICAL LABORATORY

## 2024-11-18 RX ORDER — EPINEPHRINE 0.3 MG/.3ML
0.3 INJECTION SUBCUTANEOUS PRN
Qty: 1 EACH | Refills: 1 | Status: SHIPPED | OUTPATIENT
Start: 2024-11-18

## 2024-11-19 LAB
C TRACH RRNA CVX QL NAA+PROBE: NEGATIVE
Lab: NORMAL
Lab: NORMAL
N GONORRHOEA RRNA CVX QL NAA+PROBE: NEGATIVE
T VAGINALIS RRNA CVX QL NAA+PROBE: NEGATIVE

## 2024-11-21 LAB
CASE RPRT: NORMAL
CLINICAL INFO: NORMAL
CYTOLOGY CVX/VAG STUDY: NORMAL
HPV16+18+45 E6+E7MRNA CVX NAA+PROBE: NEGATIVE
Lab: NORMAL
PAP EDUCATIONAL NOTE: NORMAL
SPECIMEN ADEQUACY: NORMAL

## 2024-12-06 ENCOUNTER — PATIENT MESSAGE (OUTPATIENT)
Dept: INTERNAL MEDICINE | Facility: CLINIC | Age: 34
End: 2024-12-06
Payer: COMMERCIAL

## 2024-12-06 ENCOUNTER — OFFICE VISIT (OUTPATIENT)
Dept: PODIATRY | Facility: CLINIC | Age: 34
End: 2024-12-06
Payer: COMMERCIAL

## 2024-12-06 DIAGNOSIS — L60.3 DYSTROPHY OF NAIL DUE TO TRAUMA: ICD-10-CM

## 2024-12-06 DIAGNOSIS — L03.031 PARONYCHIA OF TOE OF RIGHT FOOT: ICD-10-CM

## 2024-12-06 DIAGNOSIS — L60.0 INGROWN TOENAIL: Primary | ICD-10-CM

## 2024-12-06 PROCEDURE — 99999 PR PBB SHADOW E&M-EST. PATIENT-LVL III: CPT | Mod: PBBFAC,,, | Performed by: PODIATRIST

## 2024-12-06 RX ORDER — MUPIROCIN 20 MG/G
OINTMENT TOPICAL 2 TIMES DAILY
Qty: 30 G | Refills: 1 | Status: SHIPPED | OUTPATIENT
Start: 2024-12-06

## 2024-12-06 NOTE — PROCEDURES
Nail Removal    Date/Time: 12/6/2024 8:30 AM    Performed by: Jennifer Carballo DPM  Authorized by: Jennifer Carballo DPM    Consent Done?:  Yes (Written)  Time out: Immediately prior to the procedure a time out was called    Prep: patient was prepped and draped in usual sterile fashion    Location:     Location:  Right foot    Location detail:  Right big toe  Anesthesia:     Anesthesia:  Digital block    Local anesthetic:  Lidocaine 1% without epinephrine (0.75% Marcaine plain)    Anesthetic total (ml):  3  Procedure Details:     Preparation:  Skin prepped with alcohol and skin prepped with Betadine    Side:  Lateral    Wedge excision of skin of nail fold: No      Nail bed sutured?: No      Nail matrix removed:  Partial    Removal method:  Phenol and alcohol    Removed nail replaced and anchored: No      Dressing applied:  4x4, antibiotic ointment and dressing applied    Patient tolerance:  Patient tolerated the procedure well with no immediate complications

## 2024-12-06 NOTE — PROGRESS NOTES
Subjective:       Patient ID: Terri Tavera is a 34 y.o. female.    Chief Complaint: Ingrown Toenail (Right ingrown toe nail: c/o pain rate 3/10, pt is nondiabetic, pt states of having throbbing pains)      HPI: Terri Tavera presents to the office with complaints of pains to the right great  toe, due to ingrowing at the lateral border.  Does have a small spicule which reoccurs on the medial aspect. States no drainage. States no swelling, no redness and moderate  pains. Symptoms have been on going for several days and are worsening. States difficulties with walking as a result of pains. Walking and standing, particularly with shoe gear, exacerbates the ailment. Pains are sharp in nature and are rated at approx. 3/10. Patient's Primary Care Provider is Marko Jung MD.     Review of patient's allergies indicates:   Allergen Reactions    Cephalosporins Hives     duracef only    Vitamin k analogues     Duricef [cefadroxil] Hives     Hives and fever    Vitamin k      Other reaction(s): Discolored leg skin    Bactrim [sulfamethoxazole-trimethoprim] Nausea And Vomiting       Past Medical History:   Diagnosis Date    ALL (acute lymphoblastic leukemia)     Allergy     Anxiety     Depression     Encounter for blood transfusion     Fever blister     History of cholecystectomy     Hypogammaglobulinaemia, unspecified     Irritable bowel syndrome        Family History   Problem Relation Name Age of Onset    Ulcerative colitis Father Bird     Heart disease Father Bird     Diabetes Father Bird     Hyperlipidemia Father Bird     Cataracts Father Bird     Arthritis Father Bird     Depression Father Bird     Hyperlipidemia Mother Naina     Hashimoto's thyroiditis Mother Naina     Cancer Mother Naina         Gallbladder cancer    Thyroid disease Mother Naina         Hashinotos    Ulcerative colitis Paternal Aunt Gwendolyn Tavera     Breast cancer Paternal Aunt Gwendolyn Tavera     Diabetes Maternal Grandmother Alana          Parkinsons    Arthritis Maternal Grandmother Alana     Cancer Maternal Grandfather Lung         lung cancer     Ulcerative colitis Paternal Grandmother Throat     Cancer Paternal Grandmother Throat         esophageal cancer     Heart disease Paternal Grandfather Fortino     Celiac disease Neg Hx      Crohn's disease Neg Hx      Ovarian cancer Neg Hx      Colon cancer Neg Hx      Melanoma Neg Hx      Amblyopia Neg Hx      Blindness Neg Hx      Glaucoma Neg Hx      Macular degeneration Neg Hx      Retinal detachment Neg Hx      Strabismus Neg Hx         Social History     Socioeconomic History    Marital status:    Tobacco Use    Smoking status: Never    Smokeless tobacco: Never   Substance and Sexual Activity    Alcohol use: Yes     Alcohol/week: 2.0 standard drinks of alcohol     Types: 2 Glasses of wine per week     Comment: twice/week    Drug use: Never    Sexual activity: Yes     Partners: Male     Birth control/protection: I.U.D.     Comment: IUD put back in 03/2023     Social Drivers of Health     Financial Resource Strain: Low Risk  (6/2/2024)    Overall Financial Resource Strain (CARDIA)     Difficulty of Paying Living Expenses: Not very hard   Food Insecurity: No Food Insecurity (6/2/2024)    Hunger Vital Sign     Worried About Running Out of Food in the Last Year: Never true     Ran Out of Food in the Last Year: Never true   Transportation Needs: No Transportation Needs (1/7/2020)    PRAPARE - Transportation     Lack of Transportation (Medical): No     Lack of Transportation (Non-Medical): No   Physical Activity: Sufficiently Active (6/2/2024)    Exercise Vital Sign     Days of Exercise per Week: 4 days     Minutes of Exercise per Session: 60 min   Stress: Stress Concern Present (6/2/2024)    [employer] - Occupational Stress Questionnaire     Feeling of Stress : Rather much   Housing Stability: Unknown (6/2/2024)    Housing Stability Vital Sign     Unable to Pay for  Housing in the Last Year: No       Past Surgical History:   Procedure Laterality Date    ABDOMINAL SURGERY  2011    Cholecystectomy     BONE MARROW BIOPSY      Central port and subsequent removal       SECTION  2023    CHOLECYSTECTOMY  2011    lap    impant screws      INCISION AND DRAINAGE OF WOUND      [employer]    INTRATHECAL PUMP IMPLANTATION      for chemo    MULTIPLE TOOTH EXTRACTIONS      TONSILLECTOMY         Review of Systems      Objective:   There were no vitals taken for this visit.    Physical Exam  LOWER EXTREMITY PHYSICAL EXAMINATION    NEUROLOGY: Sensation to light touch is intact. Proprioception is intact.     VASCULAR: On the right foot, the dorsalis pedis pulse is 2/4 and the posterior tibial pulse is 2/4. Capillary refill time is less than 3 seconds. Hair growth is present on the dorsum of the foot and at the digits. Proximal to distal temperature is warm to warm.    DERMATOLOGY: Ingrowing of the right foot lateral nail border of the great toe.  Small spicule to the medial aspect.  The nail is incurvated into the skin of the affected border, causing pains, which are moderate in nature. There is no edema. There is no cellulitis noted. There is no drainage. No fluctuance. Granuloma formation is absent.    ORTHOPEDIC: Manual Muscle Testing is 5/5 in all planes on the right, without pains, with and without resistance. Gait pattern is slightly antalgic.    Assessment:     1. Ingrown toenail    2. Dystrophy of nail due to trauma    3. Paronychia of toe of right foot        Plan:     Ingrown toenail    Dystrophy of nail due to trauma    Paronychia of toe of right foot    Other orders  -     mupirocin (BACTROBAN) 2 % ointment; Apply topically 2 (two) times daily.  Dispense: 30 g; Refill: 1          Thorough discussion is had with the patient today, concerning the diagnosis, its etiology, and the treatment algorithm at present.    We discussed patient's options for treating the ingrown  toenail.  We discussed slant back procedure to remove the distal offending edge, we discussed temporary partial avulsion, temporary complete avulsion, and attempted permanent matricectomy.  Patient would like to proceed with attempted permanent avulsion of the right hallux lateral border with removal of the small the medial side  Discussed the risk and benefits of the procedure.  Discussed risk of recurrence.  Patient did give written consent to proceed with procedure.    Patient tolerated procedure well without complications.  Large spicule was removed without complications.  Patient will start soaking in warm water and Epson salt twice daily.  Apply antibiotic cream and a Band-Aid to the affected borders following soaking and showering.  Patient will call if there is any acute signs of infection associated with increased redness, swelling, abnormal drainage, increased pain.      Future Appointments   Date Time Provider Department Center   12/12/2024  9:30 AM Becky Navarro NP [facility] GASTRO [facility]   1/2/2025 11:00 AM LABORATORY, [facility] LAB [facility]   1/3/2025  8:30 AM Jennifer Carballo DPM [facility] POD [facility]   1/6/2025  1:00 PM Gabi Castorena PA-C [facility] DERM [facility]   1/8/2025 10:00 AM Sol Pineda PA-C [facility] IM [facility]   1/6/2026  1:40 PM [facility]

## 2024-12-20 ENCOUNTER — TELEPHONE (OUTPATIENT)
Dept: PHARMACY | Facility: CLINIC | Age: 34
End: 2024-12-20
Payer: COMMERCIAL

## 2024-12-20 NOTE — TELEPHONE ENCOUNTER
[facility]/Population Health Chart Review & Patient Outreach Details For Medication Adherence Project    Reason for Outreach Encounter: 3rd Party payor non-compliance report ([employer], [employer], C, etc)  2.  Patient Outreach Method: Reviewed patient chart   3.   Medication in question:    Diabetes Medications               tirzepatide (MOUNJARO) 5 mg/0.5 mL PnIj Inject 5 mg into the skin every 7 days.                 Mounjaro 5mg  last filled  12/12/24 for 30 day supply    4.  Reviewed and or Updates Made To: Patient Chart  5. Outreach Outcomes and/or actions taken: Patient filled medication and is on track to be adherent  Additional Notes:

## 2024-12-31 ENCOUNTER — PATIENT OUTREACH (OUTPATIENT)
Dept: ADMINISTRATIVE | Facility: HOSPITAL | Age: 34
End: 2024-12-31
Payer: COMMERCIAL

## 2025-01-02 ENCOUNTER — PATIENT MESSAGE (OUTPATIENT)
Dept: INTERNAL MEDICINE | Facility: CLINIC | Age: 35
End: 2025-01-02
Payer: COMMERCIAL

## 2025-01-03 ENCOUNTER — LAB VISIT (OUTPATIENT)
Dept: LAB | Facility: HOSPITAL | Age: 35
End: 2025-01-03
Attending: PHYSICIAN ASSISTANT
Payer: COMMERCIAL

## 2025-01-03 DIAGNOSIS — E11.65 TYPE 2 DIABETES MELLITUS WITH HYPERGLYCEMIA, WITHOUT LONG-TERM CURRENT USE OF INSULIN: ICD-10-CM

## 2025-01-03 LAB
ESTIMATED AVG GLUCOSE: 91 MG/DL (ref 68–131)
HBA1C MFR BLD: 4.8 % (ref 4–5.6)

## 2025-01-03 PROCEDURE — 36415 COLL VENOUS BLD VENIPUNCTURE: CPT | Performed by: PHYSICIAN ASSISTANT

## 2025-01-03 PROCEDURE — 83036 HEMOGLOBIN GLYCOSYLATED A1C: CPT | Performed by: PHYSICIAN ASSISTANT

## 2025-01-21 ENCOUNTER — PATIENT MESSAGE (OUTPATIENT)
Dept: GASTROENTEROLOGY | Facility: CLINIC | Age: 35
End: 2025-01-21
Payer: COMMERCIAL

## 2025-01-24 ENCOUNTER — TELEPHONE (OUTPATIENT)
Dept: PHARMACY | Facility: CLINIC | Age: 35
End: 2025-01-24
Payer: COMMERCIAL

## 2025-01-24 NOTE — TELEPHONE ENCOUNTER
[facility]Population [facility] Chart Review & Patient Outreach Details For Medication Adherence Project    Reason for Outreach Encounter: 3rd Party payor non-compliance report ([employer], [employer], C, etc)  2.  Patient Outreach Method: [employer] message  3.   Medication in question:    Diabetes Medications               tirzepatide (MOUNJARO) 5 mg/0.5 mL PnIj Inject 5 mg into the skin every 7 days.                 mounjaro  last filled  12/12/24 for 28 day supply      4.  Reviewed and or Updates Made To: Patient Chart  5. Outreach Outcomes and/or actions taken: Patient filled medication and is on track to be adherent  Additional Notes:

## 2025-01-30 ENCOUNTER — WALK IN (OUTPATIENT)
Dept: URGENT CARE | Age: 35
End: 2025-01-30

## 2025-01-30 ENCOUNTER — OFFICE VISIT (OUTPATIENT)
Dept: INTERNAL MEDICINE | Facility: CLINIC | Age: 35
End: 2025-01-30
Payer: COMMERCIAL

## 2025-01-30 VITALS
TEMPERATURE: 97.3 F | HEART RATE: 68 BPM | DIASTOLIC BLOOD PRESSURE: 84 MMHG | OXYGEN SATURATION: 98 % | RESPIRATION RATE: 20 BRPM | WEIGHT: 165.6 LBS | BODY MASS INDEX: 30.29 KG/M2 | SYSTOLIC BLOOD PRESSURE: 130 MMHG

## 2025-01-30 VITALS
DIASTOLIC BLOOD PRESSURE: 72 MMHG | OXYGEN SATURATION: 100 % | TEMPERATURE: 98 F | SYSTOLIC BLOOD PRESSURE: 110 MMHG | WEIGHT: 151.88 LBS | BODY MASS INDEX: 26.91 KG/M2 | HEART RATE: 88 BPM | HEIGHT: 63 IN

## 2025-01-30 DIAGNOSIS — Z86.32 HISTORY OF GESTATIONAL DIABETES: ICD-10-CM

## 2025-01-30 DIAGNOSIS — F32.A ANXIETY AND DEPRESSION: Primary | ICD-10-CM

## 2025-01-30 DIAGNOSIS — Z00.00 ENCOUNTER FOR ANNUAL PHYSICAL EXAM: ICD-10-CM

## 2025-01-30 DIAGNOSIS — E09.9 STEROID-INDUCED DIABETES MELLITUS, SUBSEQUENT ENCOUNTER: ICD-10-CM

## 2025-01-30 DIAGNOSIS — H92.03 OTALGIA OF BOTH EARS: Primary | ICD-10-CM

## 2025-01-30 DIAGNOSIS — F41.9 ANXIETY AND DEPRESSION: Primary | ICD-10-CM

## 2025-01-30 DIAGNOSIS — T38.0X5D STEROID-INDUCED DIABETES MELLITUS, SUBSEQUENT ENCOUNTER: ICD-10-CM

## 2025-01-30 PROBLEM — E11.65 TYPE 2 DIABETES MELLITUS WITH HYPERGLYCEMIA, WITHOUT LONG-TERM CURRENT USE OF INSULIN: Status: RESOLVED | Noted: 2023-09-25 | Resolved: 2025-01-30

## 2025-01-30 PROCEDURE — 99999 PR PBB SHADOW E&M-EST. PATIENT-LVL III: CPT | Mod: PBBFAC,,, | Performed by: PHYSICIAN ASSISTANT

## 2025-01-30 RX ORDER — AMOXICILLIN 500 MG/1
500 CAPSULE ORAL 3 TIMES DAILY
Qty: 30 CAPSULE | Refills: 0 | Status: SHIPPED | OUTPATIENT
Start: 2025-01-30

## 2025-01-30 ASSESSMENT — PAIN SCALES - GENERAL: PAINLEVEL: 8

## 2025-01-30 NOTE — PROGRESS NOTES
Subjective:      Patient ID: Terri Tavera is a 34 y.o. female.    Chief Complaint: Fall and Follow-up    HPI  History of Present Illness    CHIEF COMPLAINT:  Ms. Tavera presents today for follow-up and to discuss discontinuation of Mounjaro    ADVERSE REACTION TO MOUNJARO:  She experienced severe adverse reactions after approximately five doses of Mounjaro, including severe vomiting, profuse diarrhea, and epigastric cramping pain. These symptoms occurred multiple times, with at least 2-3 episodes. Her most recent emergency room visit on January 12th was possibly triggered by sugar intake (sangria and cake), resulting in intense nausea, vomiting, diarrhea, and abdominal cramping that persisted for hours. She denies fever. During the ER visit, she presented with tachycardia () and significant dehydration, requiring treatment with Benadryl and dicyclomine. She denies that anyone else at the gathering of 50 people experienced similar symptoms. She has decided to discontinue Mounjaro.    MEDICAL HISTORY:  She has a history of prolonged high-dose steroid use during treatment for Acute Lymphoblastic Leukemia (ALL), gestational diabetes, and possible Gilbert's syndrome with chronically elevated bilirubin levels. Recent imaging showed decreasing ovarian cysts, with previous findings of four cysts and left ovary twice the size of right.    MENTAL HEALTH:  She reports increased anxiety symptoms during holiday season and worsening anxiety related to grief over mother's passing three years ago. She continues regular follow-up with psychiatrist for management.    CURRENT MEDICATIONS:  She is currently taking Cymbalta, Klonopin, Celexa, Xanax, and has a Mirena IUD.    ENT:  She reports bilateral ear itchiness.    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)     History of acute lymphoblastic leukemia (ALL)         Current Outpatient Medications:     ALPRAZolam (XANAX) 0.25 MG tablet, Take 0.25 mg by mouth., Disp: , Rfl:     citalopram (CELEXA) 40 MG tablet, Take 40 mg by mouth once daily., Disp: , Rfl:     clonazePAM (KLONOPIN) 1 MG tablet, Take 1 mg by mouth 2 (two) times daily as needed for Anxiety., Disp: , Rfl:     DULoxetine (CYMBALTA) 20 MG capsule, Take 20 mg by mouth once daily., Disp: , Rfl:     fluocinonide (LIDEX) 0.05 % external solution, Apply to scalp once daily, Disp: 60 mL, Rfl: 5    levonorgestreL (MIRENA) 21 mcg/24 hours (8 yrs) 52 mg IUD, 1 each by Intrauterine route once., Disp: , Rfl:     Review of Systems   Constitutional:  Negative for activity change, appetite change, chills, diaphoresis, fatigue, fever and unexpected weight change.   HENT: Negative.  Negative for congestion, hearing loss, postnasal drip, rhinorrhea, sore throat, trouble swallowing and voice change.    Eyes: Negative.  Negative for visual disturbance.   Respiratory: Negative.  Negative for cough, choking, chest tightness and shortness of breath.    Cardiovascular:  Negative for chest pain, palpitations and leg swelling.   Gastrointestinal:  Negative for abdominal distention, abdominal pain, blood in stool, constipation, diarrhea, nausea and vomiting.   Endocrine: Negative for cold intolerance, heat intolerance, polydipsia and polyuria.   Genitourinary: Negative.  Negative for difficulty urinating and frequency.   Musculoskeletal:  Negative for arthralgias, back pain, gait problem, joint swelling and myalgias.   Skin:  Negative for color change, pallor, rash and wound.   Neurological:  Negative for dizziness, tremors, weakness, light-headedness, numbness and headaches.   Hematological:  Negative for adenopathy.   Psychiatric/Behavioral:  Negative for behavioral problems, confusion, self-injury, sleep disturbance and suicidal ideas. The patient is not nervous/anxious.   "    Objective:   /72 (BP Location: Left arm, Patient Position: Sitting)   Pulse 88   Temp 97.6 °F (36.4 °C) (Tympanic)   Ht 5' 3" (1.6 m)   Wt 68.9 kg (151 lb 14.4 oz)   SpO2 100%   BMI 26.91 kg/m²     Physical Exam  Vitals reviewed.   Constitutional:       General: She is not in acute distress.     Appearance: Normal appearance. She is well-developed. She is not ill-appearing, toxic-appearing or diaphoretic.   HENT:      Head: Normocephalic and atraumatic.      Right Ear: External ear normal.      Left Ear: External ear normal.      Nose: Nose normal.   Eyes:      Conjunctiva/sclera: Conjunctivae normal.      Pupils: Pupils are equal, round, and reactive to light.   Cardiovascular:      Rate and Rhythm: Normal rate and regular rhythm.      Heart sounds: Normal heart sounds. No murmur heard.     No friction rub. No gallop.   Pulmonary:      Effort: Pulmonary effort is normal. No respiratory distress.      Breath sounds: Normal breath sounds. No wheezing or rales.   Chest:      Chest wall: No tenderness.   Abdominal:      General: There is no distension.      Palpations: Abdomen is soft.      Tenderness: There is no abdominal tenderness.   Musculoskeletal:         General: Normal range of motion.      Cervical back: Normal range of motion and neck supple.   Lymphadenopathy:      Cervical: No cervical adenopathy.   Skin:     General: Skin is warm and dry.      Capillary Refill: Capillary refill takes less than 2 seconds.      Findings: No rash.   Neurological:      Mental Status: She is alert and oriented to person, place, and time.      Motor: No weakness.      Coordination: Coordination normal.      Gait: Gait normal.   Psychiatric:         Mood and Affect: Mood normal.         Behavior: Behavior normal.         Thought Content: Thought content normal.         Judgment: Judgment normal.       Lab Visit on 01/03/2025   Component Date Value Ref Range Status    Hemoglobin A1C 01/03/2025 4.8  4.0 - 5.6 % " Final    Comment: ADA Screening Guidelines:  5.7-6.4%  Consistent with prediabetes  >or=6.5%  Consistent with diabetes    High levels of fetal hemoglobin interfere with the HbA1C  assay. Heterozygous hemoglobin variants (HbS, HgC, etc)do  not significantly interfere with this assay.   However, presence of multiple variants may affect accuracy.      Estimated Avg Glucose 01/03/2025 91  68 - 131 mg/dL Final     Lab Results   Component Value Date    CREATININE 0.8 06/03/2024    BUN 9 06/03/2024     06/03/2024    K 3.9 06/03/2024     06/03/2024    CO2 27 06/03/2024     CMP  Sodium   Date Value Ref Range Status   06/03/2024 140 136 - 145 mmol/L Final     Potassium   Date Value Ref Range Status   06/03/2024 3.9 3.5 - 5.1 mmol/L Final     Chloride   Date Value Ref Range Status   06/03/2024 105 95 - 110 mmol/L Final     CO2   Date Value Ref Range Status   06/03/2024 27 23 - 29 mmol/L Final     Glucose   Date Value Ref Range Status   06/03/2024 85 70 - 110 mg/dL Final     BUN   Date Value Ref Range Status   06/03/2024 9 6 - 20 mg/dL Final     Creatinine   Date Value Ref Range Status   06/03/2024 0.8 0.5 - 1.4 mg/dL Final     Calcium   Date Value Ref Range Status   06/03/2024 9.1 8.7 - 10.5 mg/dL Final     Total Protein   Date Value Ref Range Status   06/03/2024 7.0 6.0 - 8.4 g/dL Final     Albumin   Date Value Ref Range Status   06/03/2024 4.2 3.5 - 5.2 g/dL Final     Total Bilirubin   Date Value Ref Range Status   06/03/2024 1.1 (H) 0.1 - 1.0 mg/dL Final     Comment:     For infants and newborns, interpretation of results should be based  on gestational age, weight and in agreement with clinical  observations.    Premature Infant recommended reference ranges:  Up to 24 hours.............<8.0 mg/dL  Up to 48 hours............<12.0 mg/dL  3-5 days..................<15.0 mg/dL  6-29 days.................<15.0 mg/dL       Alkaline Phosphatase   Date Value Ref Range Status   06/03/2024 84 55 - 135 U/L Final     AST  "  Date Value Ref Range Status   06/03/2024 25 10 - 40 U/L Final     ALT   Date Value Ref Range Status   06/03/2024 39 10 - 44 U/L Final     Anion Gap   Date Value Ref Range Status   06/03/2024 8 8 - 16 mmol/L Final     eGFR   Date Value Ref Range Status   06/03/2024 >60.0 >60 mL/min/1.73 m^2 Final   12/22/2022 > 60 >60 ml/min/1.73 m Final     Comment:     Reference Table for Population Mean eGFRs From NHANES III4  Age (Years)                Mean eGFR*  20-29                116 mL/min/1.73 m2  30-39                107 mL/min/1.73 m2  40-49                 99 mL/min/1.73 m2  50-59                 93 mL/min/1.73 m2  60-69                 85 mL/min/1.73 m2  70+                 75 mL/min/1.73 m2      *For diagnostic purposes the NK[employer] recommends laboratories  report eGFR values greater than or equal to 60 as "> 60  mL/min/1.73 m2," not as an exact number.         Assessment:     1. Anxiety and depression    2. History of gestational diabetes    3. Steroid-induced diabetes mellitus, subsequent encounter    4. Encounter for annual physical exam      Plan:   Discontinued Mounjaro due to severe adverse effects including vomiting, diarrhea, and dehydration requiring ER visit  Assessed patient does not have diabetes; Mounjaro was primarily prescribed for weight loss  Noted chronic elevated bilirubin, possibly due to Gilbert's syndrome (benign condition)  Evaluated recent ovarian cysts, which have decreased in size without intervention  Reviewed current medications: Cymbalta, Klonopin, Celexa, Xanax, and Mirena  Assessed anxiety management as adequate with current psychiatric follow-up    Anxiety and depression  - Continued current doses of Cymbalta, Klonopin, Celexa, and Xanax.  - Acknowledged patient's ongoing anxiety, particularly related to grief and loss of mother, as normal stages of grief.  - Noted that the patient is following up with psychiatrist regularly for anxiety management.    Steroid-induced diabetes mellitus, " subsequent encounter  History of gestational diabetes  - Discontinued Mounjaro after approximately 5 doses due to severe adverse effects including vomiting, diarrhea, and dehydration.  - Evaluated A1C level and found them to be within normal range, questioning the diabetes diagnosis.  - Given the patient's previous history of gestational diabetes and current A1C level not warranting diagnosis, recommended moving to annual follow-ups.  - Scheduled annual exam with comprehensive labs, including lipid panel and A1C, in July.      Encounter for annual physical exam  -     CBC Auto Differential; Future; Expected date: 07/30/2025  -     Comprehensive Metabolic Panel; Future; Expected date: 07/30/2025  -     Hemoglobin A1C; Future; Expected date: 07/30/2025  -     Lipid Panel; Future; Expected date: 07/30/2025    GILBERT'S SYNDROME:  - Confirmed Gilbert's syndrome diagnosis based on chronic elevated bilirubin levels.  - Explained that elevated bilirubin can occur during illness, but patient's chronic elevation is likely due to this benign condition.  - Reassured the patient about the benign nature of Gilbert's syndrome.    Itchy ears  - Examined ears and found zero wax.  - Advised against overdrying ears with cotton swabs as it can cause itchiness.  - Explained that some wax is needed for lubrication.  - Recommend OTChydrocortisone cream for ear itchiness: instructed to apply small amount with finger to ear canals as needed.  - Recommend mineral oil drops for ear lubrication: instructed to apply a few drops as needed if ears feel dry.    BIRTH CONTROL:  - Continued Mirena.    FOLLOW UP:  - Follow up in 6 months, then transition to yearly follow-ups if stable.  - Contact the office if any new symptoms or concerns arise before scheduled follow-up.       This note was generated with the assistance of ambient listening technology. Verbal consent was obtained by the patient and accompanying visitor(s) for the recording of  patient appointment to facilitate this note. I attest to having reviewed and edited the generated note for accuracy, though some syntax or spelling errors may persist. Please contact the author of this note for any clarification.      Follow up in about 6 months (around 7/30/2025), or if symptoms worsen or fail to improve.

## 2025-01-31 ENCOUNTER — E-ADVICE (OUTPATIENT)
Dept: INTERNAL MEDICINE | Age: 35
End: 2025-01-31

## 2025-01-31 RX ORDER — PANTOPRAZOLE SODIUM 40 MG/1
40 TABLET, DELAYED RELEASE ORAL 2 TIMES DAILY
Qty: 60 TABLET | Refills: 3 | Status: SHIPPED | OUTPATIENT
Start: 2025-01-31

## 2025-02-03 ENCOUNTER — TELEPHONE (OUTPATIENT)
Dept: INTERNAL MEDICINE | Age: 35
End: 2025-02-03

## 2025-02-04 RX ORDER — OMEPRAZOLE 40 MG/1
40 CAPSULE, DELAYED RELEASE ORAL DAILY
Qty: 180 CAPSULE | Refills: 3 | Status: SHIPPED | OUTPATIENT
Start: 2025-02-04

## 2025-02-05 ENCOUNTER — E-ADVICE (OUTPATIENT)
Dept: INTERNAL MEDICINE | Age: 35
End: 2025-02-05

## 2025-02-22 ENCOUNTER — PATIENT MESSAGE (OUTPATIENT)
Dept: GASTROENTEROLOGY | Facility: CLINIC | Age: 35
End: 2025-02-22
Payer: COMMERCIAL

## 2025-02-26 ENCOUNTER — OFFICE VISIT (OUTPATIENT)
Dept: GASTROENTEROLOGY | Facility: CLINIC | Age: 35
End: 2025-02-26
Payer: COMMERCIAL

## 2025-02-26 VITALS
HEART RATE: 105 BPM | SYSTOLIC BLOOD PRESSURE: 111 MMHG | DIASTOLIC BLOOD PRESSURE: 74 MMHG | WEIGHT: 157.44 LBS | HEIGHT: 63 IN | BODY MASS INDEX: 27.89 KG/M2

## 2025-02-26 DIAGNOSIS — R10.84 GENERALIZED ABDOMINAL PAIN: ICD-10-CM

## 2025-02-26 DIAGNOSIS — R11.2 NAUSEA AND VOMITING, UNSPECIFIED VOMITING TYPE: Primary | ICD-10-CM

## 2025-02-26 DIAGNOSIS — R10.12 LUQ PAIN: ICD-10-CM

## 2025-02-26 PROCEDURE — 3078F DIAST BP <80 MM HG: CPT | Mod: CPTII,S$GLB,, | Performed by: NURSE PRACTITIONER

## 2025-02-26 PROCEDURE — 3044F HG A1C LEVEL LT 7.0%: CPT | Mod: CPTII,S$GLB,, | Performed by: NURSE PRACTITIONER

## 2025-02-26 PROCEDURE — 1159F MED LIST DOCD IN RCRD: CPT | Mod: CPTII,S$GLB,, | Performed by: NURSE PRACTITIONER

## 2025-02-26 PROCEDURE — 3074F SYST BP LT 130 MM HG: CPT | Mod: CPTII,S$GLB,, | Performed by: NURSE PRACTITIONER

## 2025-02-26 PROCEDURE — 1160F RVW MEDS BY RX/DR IN RCRD: CPT | Mod: CPTII,S$GLB,, | Performed by: NURSE PRACTITIONER

## 2025-02-26 PROCEDURE — 99214 OFFICE O/P EST MOD 30 MIN: CPT | Mod: S$GLB,,, | Performed by: NURSE PRACTITIONER

## 2025-02-26 PROCEDURE — 99999 PR PBB SHADOW E&M-EST. PATIENT-LVL III: CPT | Mod: PBBFAC,,, | Performed by: NURSE PRACTITIONER

## 2025-02-26 PROCEDURE — 3008F BODY MASS INDEX DOCD: CPT | Mod: CPTII,S$GLB,, | Performed by: NURSE PRACTITIONER

## 2025-02-26 RX ORDER — PANTOPRAZOLE SODIUM 40 MG/1
40 TABLET, DELAYED RELEASE ORAL DAILY
Qty: 90 TABLET | Refills: 3 | Status: SHIPPED | OUTPATIENT
Start: 2025-02-26 | End: 2026-02-26

## 2025-02-28 ENCOUNTER — RESULTS FOLLOW-UP (OUTPATIENT)
Dept: GASTROENTEROLOGY | Facility: CLINIC | Age: 35
End: 2025-02-28

## 2025-02-28 ENCOUNTER — HOSPITAL ENCOUNTER (OUTPATIENT)
Dept: RADIOLOGY | Facility: HOSPITAL | Age: 35
Discharge: HOME | End: 2025-02-28
Attending: NURSE PRACTITIONER
Payer: COMMERCIAL

## 2025-02-28 DIAGNOSIS — R10.84 GENERALIZED ABDOMINAL PAIN: ICD-10-CM

## 2025-02-28 DIAGNOSIS — R10.12 LUQ PAIN: ICD-10-CM

## 2025-02-28 DIAGNOSIS — R11.2 NAUSEA AND VOMITING, UNSPECIFIED VOMITING TYPE: ICD-10-CM

## 2025-02-28 PROCEDURE — 74177 CT ABD & PELVIS W/CONTRAST: CPT | Mod: TC,PN

## 2025-02-28 PROCEDURE — 74177 CT ABD & PELVIS W/CONTRAST: CPT | Mod: 26,,, | Performed by: STUDENT IN AN ORGANIZED HEALTH CARE EDUCATION/TRAINING PROGRAM

## 2025-02-28 PROCEDURE — A9698 NON-RAD CONTRAST MATERIALNOC: HCPCS | Mod: PN | Performed by: NURSE PRACTITIONER

## 2025-02-28 PROCEDURE — 25500020 PHARM REV CODE 255: Mod: PN | Performed by: NURSE PRACTITIONER

## 2025-02-28 RX ADMIN — IOHEXOL 75 ML: 350 INJECTION, SOLUTION INTRAVENOUS at 10:02

## 2025-02-28 RX ADMIN — IOHEXOL 1000 ML: 12 SOLUTION ORAL at 10:02

## 2025-03-03 ENCOUNTER — CLINICAL SUPPORT (OUTPATIENT)
Dept: REHABILITATION | Facility: HOSPITAL | Age: 35
End: 2025-03-03
Payer: COMMERCIAL

## 2025-03-03 DIAGNOSIS — R29.898 UPPER EXTREMITY WEAKNESS: ICD-10-CM

## 2025-03-03 DIAGNOSIS — M53.82 DECREASED RANGE OF MOTION OF INTERVERTEBRAL DISCS OF CERVICAL SPINE: Primary | ICD-10-CM

## 2025-03-03 PROCEDURE — 97162 PT EVAL MOD COMPLEX 30 MIN: CPT | Mod: PN

## 2025-03-03 PROCEDURE — 97140 MANUAL THERAPY 1/> REGIONS: CPT | Mod: PN

## 2025-03-03 PROCEDURE — 97110 THERAPEUTIC EXERCISES: CPT | Mod: PN

## 2025-03-03 NOTE — PROGRESS NOTES
Outpatient Rehab    Physical Therapy Evaluation    Patient Name: Terri Tavera  MRN: [id number]  YOB: 1990  Encounter Date: 3/3/2025    Therapy Diagnosis:   Encounter Diagnoses   Name Primary?    Decreased range of motion of intervertebral discs of cervical spine Yes    Upper extremity weakness      Physician: Self, Aaareferral    Physician Orders: Eval and Treat  Medical Diagnosis: M54.2 (ICD-10-CM) - Neck pain    Visit # / Visits Authorized:  1 / 1   Date of Evaluation:  3/3/2025   Insurance Authorization Period: 2/25/2025 to 12/31/2025  Plan of Care Certification:  3/3/2025 to 4/28/2025      Time In: 1250   Time Out: 1345  Total Time: 55   Total Billable Time: 55 minutes     Intake Outcome Measure for FOTO Survey    Therapist reviewed FOTO scores for Terri Tavera on 3/3/2025.   FOTO report - see Media section or FOTO account episode details.     Intake Score: 47%         Subjective   History of Present Illness  Terri is a 35 y.o. female who reports to physical therapy with a chief concern of B neck pain.     The patient reports a medical diagnosis of M54.2 (ICD-10-CM) - Neck pain.    Diagnostic tests related to this condition: X-ray.   X-Ray Details: EXAM: XR THORACIC SPINE AP LATERAL     CLINICAL HISTORY: Back pain     FINDINGS:  The alignment is normal.  Disc spaces and vertebral body heights are maintained.  The pedicles are visible at all levels bilaterally.        Impression:   No evidence of acute injury or significant degenerative changes.     Finalized on: 6/10/2024 10:59 AM By:  Yomi Salgado  BRR# [id number]      2024-06-10 11:01:[id number]    REJI    History of Present Condition/Illness: Patient reports neck pain on and off for a couple years. She is an OT in acute and inpatient rehab. She reports no known mechanism of injury. Patient reports he has terrible posture and upper trap pain. This will be her 3rd bout of therapy. Dry needling has helped in the past. Pain is radiating down to  mid arm bilaterally sometimes.     Pain     Patient reports a current pain level of 5/10. Pain at best is reported as 2/10. Pain at worst is reported as 10/10.   Location: neck pain (sometimes L more than R)  Clinical Progression (since onset): Worsening  Pain Qualities: Aching, Burning, Tightness  Pain-Relieving Factors: Medications - over-the-counter, Medications - prescription  Pain-Aggravating Factors: Lifting, Bending, Computer work, Driving  Has robaxin rx      Employment  Patient does not report that: Does the patient's condition impact their ability to work?  Employment Status: Employed part-time   OT       Past Medical History/Physical Systems Review:   Terri Tavera  has a past medical history of ALL (acute lymphoblastic leukemia), Allergy, Anxiety, Depression, Encounter for blood transfusion, Fever blister, History of cholecystectomy, Hypogammaglobulinaemia, unspecified, Irritable bowel syndrome, and Type 2 diabetes mellitus with hyperglycemia, without long-term current use of insulin.    Terri Tavera  has a past surgical history that includes Cholecystectomy (2011); Incision and drainage of wound; Bone marrow biopsy; Intrathecal pump implantation; Central port and subsequent removal; Tonsillectomy; Abdominal surgery (2011);  section (2023); Multiple tooth extractions; and impant screws.    Terri has a current medication list which includes the following prescription(s): alprazolam, citalopram, clonazepam, duloxetine, levonorgestrel, and pantoprazole.    Review of patient's allergies indicates:   Allergen Reactions    Cephalosporins Hives     duracef only    Vitamin k analogues     Duricef [cefadroxil] Hives     Hives and fever    Vitamin k      Other reaction(s): Discolored leg skin    Bactrim [sulfamethoxazole-trimethoprim] Nausea And Vomiting        Objective        Cervical Range of Motion   Active (deg) Passive (deg) Pain   Flexion 26       Extension 44       Right Lateral  "Flexion 27       Right Rotation 56       Left Lateral Flexion 29       Left Rotation 49                   Shoulder Strength - Planes of Motion   Right Strength Right Pain Left Strength Left  Pain   Flexion 4+   4+     Extension           ABduction 4+   4+     ADduction           Horizontal ABduction           Horizontal ADduction           Internal Rotation 0° 4   4     Internal Rotation 90°           External Rotation 0° 4   4     External Rotation 90°               Elbow Strength   Right Strength Right Pain Left Strength Left  Pain   Flexion (C6) 4   4     Extension (C7) 4   4          Cervical Screen  Tests  Positive: Left Spurling's A  Negative: Right Spurling's A  Cervical Range of Motion        Thoracic Range of Motion       Cervical/Thoracic Special Tests      Cervical Tests  Positive: Left Spurling's A  Negative: Right Cervical Compression, Left Cervical Compression, and Right Spurling's A       Treatment:  Therapeutic Exercise  Therapeutic Exercise Activity 1: chin tucks 3" x 10  Therapeutic Exercise Activity 2: upper trap and levator stretch 2 x 20"  Therapeutic Exercise Activity 3: thoracic series x 10  Therapeutic Exercise Activity 4: GTB rows and extension x 10    10 minutes     Manual Therapy  Manual Therapy Activity 1: MFR of B levator, upper trap, suboccipitals  Manual Therapy Activity 2: thoracic and ribs PA mobs  Manual Therapy Activity 3: MFR of thoracic erector spinae     20 minutes     Assessment & Plan   Assessment  Terri presents with a condition of Moderate complexity.   Presentation of Symptoms: Changing  Will Comorbidities Impact Care: Yes  See past medical history listed above     Functional Limitations: Activity tolerance, Completing self-care activities, Completing work/school activities, Driving, Functional mobility, Pain when reaching, Pain with ADLs/IADLs, Participating in leisure activities, Performing household chores, Range of motion, Reaching  Impairments: Abnormal muscle tone, " Abnormal or restricted range of motion, Impaired physical strength, Activity intolerance, Pain with functional activity    Prognosis: Excellent  Assessment Details: Patient presents with poor exercise and manual tolerance. Patient required frequent breaks between exercises secondary to pain.    Plan  From a physical therapy perspective, the patient would benefit from: Skilled Rehab Services    Planned therapy interventions include: Therapeutic exercise, Therapeutic activities, Neuromuscular re-education, Manual therapy, and ADLs/IADLs.            Visit Frequency: 2 times Per Week for 8 Weeks.       This plan was discussed with Patient.   Discussion participants: Agreed Upon Plan of Care             Patient's spiritual, cultural, and educational needs considered and patient agreeable to plan of care and goals.           Goals:   Active       Functional outcome       Patient will show a significant change in FOTO patient-reported outcome tool to demonstrate subjective improvement       Start:  03/03/25    Expected End:  04/28/25            Patient will demonstrate independence in home program for support of progression       Start:  03/03/25    Expected End:  04/28/25               Pain       Patient will report pain of 6/10 at its worse demonstrating a reduction of overall pain       Start:  03/03/25    Expected End:  04/28/25               Range of Motion       Patient will achieve pain free cervical range of motion in all direction for maximal function.         Start:  03/03/25    Expected End:  04/28/25               Strength       Patient will achieve bilateral shoulder flexion strength of 5/5       Start:  03/03/25    Expected End:  04/28/25            Patient will achieve bilateral shoulder abduction strength of 5/5       Start:  03/03/25    Expected End:  04/28/25            Patient will achieve bilateral shoulder external rotation strength of 5/5 in neutral       Start:  03/03/25    Expected End:  04/28/25                 Kathy Snyder, PT

## 2025-03-10 ENCOUNTER — CLINICAL SUPPORT (OUTPATIENT)
Dept: REHABILITATION | Facility: HOSPITAL | Age: 35
End: 2025-03-10
Payer: COMMERCIAL

## 2025-03-10 DIAGNOSIS — R29.898 UPPER EXTREMITY WEAKNESS: ICD-10-CM

## 2025-03-10 DIAGNOSIS — M53.82 DECREASED RANGE OF MOTION OF INTERVERTEBRAL DISCS OF CERVICAL SPINE: Primary | ICD-10-CM

## 2025-03-10 PROCEDURE — 97112 NEUROMUSCULAR REEDUCATION: CPT | Mod: PN

## 2025-03-10 PROCEDURE — 97140 MANUAL THERAPY 1/> REGIONS: CPT | Mod: PN

## 2025-03-10 PROCEDURE — 97110 THERAPEUTIC EXERCISES: CPT | Mod: PN

## 2025-03-10 NOTE — PROGRESS NOTES
"  Outpatient Rehab    Physical Therapy Visit    Patient Name: Terri Tavera  MRN: [id number]  YOB: 1990  Encounter Date: 3/10/2025    Therapy Diagnosis:   Encounter Diagnoses   Name Primary?    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  Pain reported as 3/10. L neck and shoulder    Objective            Treatment:  Therapeutic Exercise  Therapeutic Exercise Activity 1: chin tucks 3" x 10  Therapeutic Exercise Activity 2: upper trap and levator stretch 2 x 20"  Therapeutic Exercise Activity 3: thoracic series x 10  Therapeutic Exercise Activity 4: GTB rows 2 x 10  Therapeutic Exercise Activity 5: UBE 3 minutes forward and backwards  15 minutes     Manual Therapy  Manual Therapy Activity 1: MFR of L levator, upper trap, suboccipitals (supine)  Manual Therapy Activity 2: thoracic PA mobs  Manual Therapy Activity 3: MFR of L thoracic erector spinae (prone)  Manual Therapy Activity 4: silicone and IASTM to L upper trap in sitting  22 minutes     Balance/Neuromuscular Re-Education  Balance/Neuromuscular Re-Education Activity 1: SNAGs with rotation 5" x 10  Balance/Neuromuscular Re-Education Activity 2: SNAGs with extension x 10  8 minutes        Assessment & Plan   Assessment: Patient presents with left sided neck and shoulder pain. Patient reports decreased cervical left side bending and rotation since working this weekend. Patient reported " not adequate release of muscles in supine and prone, therefore additional manuals were performed in sitting. Cervical range of motion was taken throughout treatment and patient's cervical side bending increased by 5 degrees after SNAGs.  Evaluation/Treatment Tolerance: Patient limited by pain    Patient will continue to benefit from skilled outpatient physical therapy to address the deficits listed in the problem list box on initial evaluation, provide pt/family education and to maximize pt's level of independence in the home and community environment.     Patient's spiritual, cultural, and educational needs considered and patient agreeable to plan of care and goals.           Plan: Continue current plan of care and therapy frequency    Goals:   Active       Functional outcome       Patient will show a significant change in FOTO patient-reported outcome tool to demonstrate subjective improvement       Start:  03/03/25    Expected End:  04/28/25            Patient will demonstrate independence in home program for support of progression       Start:  03/03/25    Expected End:  04/28/25               Pain       Patient will report pain of 6/10 at its worse demonstrating a reduction of overall pain       Start:  03/03/25    Expected End:  04/28/25               Range of Motion       Patient will achieve pain free cervical range of motion in all direction for maximal function.         Start:  03/03/25    Expected End:  04/28/25               Strength       Patient will achieve bilateral shoulder flexion strength of 5/5       Start:  03/03/25    Expected End:  04/28/25            Patient will achieve bilateral shoulder abduction strength of 5/5       Start:  03/03/25    Expected End:  04/28/25            Patient will achieve bilateral shoulder external rotation strength of 5/5 in neutral       Start:  03/03/25    Expected End:  04/28/25                Kathy Snyder, PT

## 2025-03-12 ENCOUNTER — CLINICAL SUPPORT (OUTPATIENT)
Dept: REHABILITATION | Facility: HOSPITAL | Age: 35
End: 2025-03-12
Payer: COMMERCIAL

## 2025-03-12 DIAGNOSIS — M53.82 DECREASED RANGE OF MOTION OF INTERVERTEBRAL DISCS OF CERVICAL SPINE: Primary | ICD-10-CM

## 2025-03-12 DIAGNOSIS — R29.898 UPPER EXTREMITY WEAKNESS: ICD-10-CM

## 2025-03-12 PROCEDURE — 97112 NEUROMUSCULAR REEDUCATION: CPT | Mod: PN

## 2025-03-12 PROCEDURE — 97110 THERAPEUTIC EXERCISES: CPT | Mod: PN

## 2025-03-12 PROCEDURE — 97140 MANUAL THERAPY 1/> REGIONS: CPT | Mod: PN

## 2025-03-13 NOTE — PROGRESS NOTES
"  Outpatient Rehab    Physical Therapy Visit    Patient Name: Terri Tavera  MRN: [id number]  YOB: 1990  Encounter Date: 3/12/2025    Therapy Diagnosis:   Encounter Diagnoses   Name Primary?    Decreased range of motion of intervertebral discs of cervical spine Yes    Upper extremity weakness      Physician: Self, Aaareferral    Physician Orders: Eval and Treat  Medical Diagnosis: M54.2 (ICD-10-CM) - Neck pain     Visit # / Visits Authorized:  2 / 20 (+eval)    Date of Evaluation:  3/3/2025   Insurance Authorization Period: 2/25/2025 to 12/31/2025  Plan of Care Certification:  3/3/2025 to 4/28/2025      Time In: 1030   Time Out: 1130  Total Time: 60   Total Billable Time: 60 minutes         Subjective   Patient reports she is feeling better but still with tightness in neck with sharper pain on left side..  Pain reported as 3/10. L neck and shoulder    Objective            Treatment:  Therapeutic Exercise  Therapeutic Exercise Activity 1: chin tucks 3" x 10  Therapeutic Exercise Activity 2: upper trap and levator stretch 2 x 20"  Therapeutic Exercise Activity 3: thoracic series x 10  Therapeutic Exercise Activity 4: GTB rows 2 x 10  Therapeutic Exercise Activity 5: UBE 3 minutes forward and backwards  25 minutes     Manual Therapy  Manual Therapy Activity 1: MFR of L levator, upper trap, suboccipitals (supine) 10 min  Manual Therapy Activity 2: thoracic PA mobs 5 min  Manual Therapy Activity 5: STM to left side facets and paraspinals 10 min  25 minutes     Balance/Neuromuscular Re-Education  Balance/Neuromuscular Re-Education Activity 1: SNAGs with rotation 5" x 10  Balance/Neuromuscular Re-Education Activity 2: SNAGs with extension x 10  8 minutes        Assessment & Plan   Assessment: Patient with less neck stiffness after manual therapy working on facet joints and paraspinals on left side along with exercises for cervical joint mobility.  Evaluation/Treatment Tolerance: Patient tolerated treatment " well    Patient will continue to benefit from skilled outpatient physical therapy to address the deficits listed in the problem list box on initial evaluation, provide pt/family education and to maximize pt's level of independence in the home and community environment.     Patient's spiritual, cultural, and educational needs considered and patient agreeable to plan of care and goals.           Plan: Continue current plan of care and therapy frequency    Goals:   Active       Functional outcome       Patient will show a significant change in FOTO patient-reported outcome tool to demonstrate subjective improvement       Start:  03/03/25    Expected End:  04/28/25            Patient will demonstrate independence in home program for support of progression       Start:  03/03/25    Expected End:  04/28/25               Pain       Patient will report pain of 6/10 at its worse demonstrating a reduction of overall pain       Start:  03/03/25    Expected End:  04/28/25               Range of Motion       Patient will achieve pain free cervical range of motion in all direction for maximal function.         Start:  03/03/25    Expected End:  04/28/25               Strength       Patient will achieve bilateral shoulder flexion strength of 5/5       Start:  03/03/25    Expected End:  04/28/25            Patient will achieve bilateral shoulder abduction strength of 5/5       Start:  03/03/25    Expected End:  04/28/25            Patient will achieve bilateral shoulder external rotation strength of 5/5 in neutral       Start:  03/03/25    Expected End:  04/28/25                Carl Vera PT

## 2025-03-17 ENCOUNTER — CLINICAL SUPPORT (OUTPATIENT)
Dept: REHABILITATION | Facility: HOSPITAL | Age: 35
End: 2025-03-17
Payer: COMMERCIAL

## 2025-03-17 DIAGNOSIS — R29.898 UPPER EXTREMITY WEAKNESS: ICD-10-CM

## 2025-03-17 DIAGNOSIS — M53.82 DECREASED RANGE OF MOTION OF INTERVERTEBRAL DISCS OF CERVICAL SPINE: Primary | ICD-10-CM

## 2025-03-17 PROCEDURE — 97530 THERAPEUTIC ACTIVITIES: CPT | Mod: PN

## 2025-03-17 PROCEDURE — 97112 NEUROMUSCULAR REEDUCATION: CPT | Mod: PN

## 2025-03-17 PROCEDURE — 97110 THERAPEUTIC EXERCISES: CPT | Mod: PN

## 2025-03-17 PROCEDURE — 97140 MANUAL THERAPY 1/> REGIONS: CPT | Mod: PN

## 2025-03-17 NOTE — PROGRESS NOTES
"  Outpatient Rehab    Physical Therapy Visit    Patient Name: Terri Tavera  MRN: [id number]  YOB: 1990  Encounter Date: 3/17/2025    Therapy Diagnosis:   Encounter Diagnoses   Name Primary?    Decreased range of motion of intervertebral discs of cervical spine Yes    Upper extremity weakness        Physician: Self, Aaareferral    Physician Orders: Eval and Treat  Medical Diagnosis: M54.2 (ICD-10-CM) - Neck pain     Visit # / Visits Authorized:  2 / 20 (+eval)    Date of Evaluation:  3/3/2025   Insurance Authorization Period: 2/25/2025 to 12/31/2025  Plan of Care Certification:  3/3/2025 to 4/28/2025      Time In: 1100   Time Out: 1150  Total Time: 50   Total Billable Time: 50 minutes         Subjective   Patient reports she is feeling better and manuals last visit helped..  Pain reported as 4/10. L neck and shoulder    Objective            Treatment:  Therapeutic Exercise  TE 1: chin tucks 5" x 20  TE 2: upper trap and levator stretch 2 x 20"  TE 3: thoracic series x 20  TE 4: pec stretch 3 x 20"  TE 5: UBE 3 minutes forward and backwards  20 minutes     Manual Therapy  MT 4: silicone and IASTM to L upper trap in sitting  MT 5: STM to left side facets and paraspinals 10 min  12 minutes     Balance/Neuromuscular Re-Education  NMR 1: SNAGs with rotation 5" x 10  NMR 2: SNAGs with extension x 10  8 minutes     Therapeutic Activity  TA 1: FM rows 10# 2 x 10  TA 2: FM shoulder ext 7# x 10  TA 3: FM shoulder adduction 7# x 10  10 minutes     Assessment & Plan   Assessment: Thera band rows were progressed to free motion machine and single arm for increased postural stability. Also added shoulder extension and adduction for improved scapular stability. Lastly, added pec stretch for imrpoved posture and decreased anterior muscular tightness.  Evaluation/Treatment Tolerance: Patient tolerated treatment well    Patient will continue to benefit from skilled outpatient physical therapy to address the deficits " listed in the problem list box on initial evaluation, provide pt/family education and to maximize pt's level of independence in the home and community environment.     Patient's spiritual, cultural, and educational needs considered and patient agreeable to plan of care and goals.           Plan: Continue current plan of care and therapy frequency    Goals:   Active       Functional outcome       Patient will show a significant change in FOTO patient-reported outcome tool to demonstrate subjective improvement       Start:  03/03/25    Expected End:  04/28/25            Patient will demonstrate independence in home program for support of progression       Start:  03/03/25    Expected End:  04/28/25               Pain       Patient will report pain of 6/10 at its worse demonstrating a reduction of overall pain       Start:  03/03/25    Expected End:  04/28/25               Range of Motion       Patient will achieve pain free cervical range of motion in all direction for maximal function.         Start:  03/03/25    Expected End:  04/28/25               Strength       Patient will achieve bilateral shoulder flexion strength of 5/5       Start:  03/03/25    Expected End:  04/28/25            Patient will achieve bilateral shoulder abduction strength of 5/5       Start:  03/03/25    Expected End:  04/28/25            Patient will achieve bilateral shoulder external rotation strength of 5/5 in neutral       Start:  03/03/25    Expected End:  04/28/25                Kathy Snyder PT

## 2025-03-19 ENCOUNTER — CLINICAL SUPPORT (OUTPATIENT)
Dept: REHABILITATION | Facility: HOSPITAL | Age: 35
End: 2025-03-19
Payer: COMMERCIAL

## 2025-03-19 DIAGNOSIS — M53.82 DECREASED RANGE OF MOTION OF INTERVERTEBRAL DISCS OF CERVICAL SPINE: Primary | ICD-10-CM

## 2025-03-19 DIAGNOSIS — R29.898 UPPER EXTREMITY WEAKNESS: ICD-10-CM

## 2025-03-19 PROCEDURE — 97140 MANUAL THERAPY 1/> REGIONS: CPT | Mod: PN

## 2025-03-19 PROCEDURE — 97530 THERAPEUTIC ACTIVITIES: CPT | Mod: PN

## 2025-03-19 PROCEDURE — 97112 NEUROMUSCULAR REEDUCATION: CPT | Mod: PN

## 2025-03-19 PROCEDURE — 97110 THERAPEUTIC EXERCISES: CPT | Mod: PN

## 2025-03-19 NOTE — PROGRESS NOTES
"    Outpatient Rehab    Physical Therapy Visit    Patient Name: Terri Tavera  MRN: [id number]  YOB: 1990  Encounter Date: 3/19/2025    Therapy Diagnosis:   Encounter Diagnoses   Name Primary?    Decreased range of motion of intervertebral discs of cervical spine Yes    Upper extremity weakness      Physician: Self, Aaareferral    Physician Orders: Eval and Treat  Medical Diagnosis: Neck pain    Visit # / Visits Authorized:  4 / 20  Date of Evaluation: 3/3/2025  Insurance Authorization Period: 2/27/2025 to 12/31/2025  Plan of Care Certification:  3/3/2025 to 4/28/2025      Time In: 1025   Time Out: 1125  Total Time: 60   Total Billable Time: 60 minutes          Subjective   Patient reports she continues to feel better..  Pain reported as 3/10. L neck and shoulder    Objective            Treatment:  Therapeutic Exercise  TE 2: upper trap and levator stretch 2 x 20"  TE 4: pec stretch 3 x 20"  TE 5: UBE 3 minutes forward and backwards  Manual Therapy  MT 4: IASTM to L upper trap in sitting  MT 5: STM to left side facets and paraspinals 10 min  Balance/Neuromuscular Re-Education  NMR 1: SNAGs with rotation 5" x 10  NMR 2: SNAGs with extension x 10  Therapeutic Activity  TA 1: FM rows 10# 2 x 10  TA 2: FM shoulder ext 7# 2 x 10  TA 3: FM shoulder adduction 7# 2 x 10    Time Entry(in minutes):  Manual Therapy Time Entry: 20  Neuromuscular Re-Education Time Entry: 10  Therapeutic Activity Time Entry: 15  Therapeutic Exercise Time Entry: 15    Assessment & Plan   Assessment: Increased the number of sets of the free motion exercises. Patient reported pain with thoracic series today and more sensitive during supine manuals.  Evaluation/Treatment Tolerance: Patient tolerated treatment well    Patient will continue to benefit from skilled outpatient physical therapy to address the deficits listed in the problem list box on initial evaluation, provide pt/family education and to maximize pt's level of independence " in the home and community environment.     Patient's spiritual, cultural, and educational needs considered and patient agreeable to plan of care and goals.           Plan: Continue current plan of care and therapy frequency    Goals:   Active       Functional outcome       Patient will show a significant change in FOTO patient-reported outcome tool to demonstrate subjective improvement       Start:  03/03/25    Expected End:  04/28/25            Patient will demonstrate independence in home program for support of progression       Start:  03/03/25    Expected End:  04/28/25               Pain       Patient will report pain of 6/10 at its worse demonstrating a reduction of overall pain       Start:  03/03/25    Expected End:  04/28/25               Range of Motion       Patient will achieve pain free cervical range of motion in all direction for maximal function.         Start:  03/03/25    Expected End:  04/28/25               Strength       Patient will achieve bilateral shoulder flexion strength of 5/5       Start:  03/03/25    Expected End:  04/28/25            Patient will achieve bilateral shoulder abduction strength of 5/5       Start:  03/03/25    Expected End:  04/28/25            Patient will achieve bilateral shoulder external rotation strength of 5/5 in neutral       Start:  03/03/25    Expected End:  04/28/25                Kathy Snyder PT

## 2025-03-20 ENCOUNTER — E-ADVICE (OUTPATIENT)
Dept: INTERNAL MEDICINE | Age: 35
End: 2025-03-20

## 2025-03-24 ENCOUNTER — CLINICAL SUPPORT (OUTPATIENT)
Dept: REHABILITATION | Facility: HOSPITAL | Age: 35
End: 2025-03-24
Payer: COMMERCIAL

## 2025-03-24 DIAGNOSIS — M53.82 DECREASED RANGE OF MOTION OF INTERVERTEBRAL DISCS OF CERVICAL SPINE: Primary | ICD-10-CM

## 2025-03-24 DIAGNOSIS — R29.898 UPPER EXTREMITY WEAKNESS: ICD-10-CM

## 2025-03-24 PROCEDURE — 97530 THERAPEUTIC ACTIVITIES: CPT | Mod: PN

## 2025-03-24 PROCEDURE — 97110 THERAPEUTIC EXERCISES: CPT | Mod: PN

## 2025-03-24 PROCEDURE — 97112 NEUROMUSCULAR REEDUCATION: CPT | Mod: PN

## 2025-03-24 PROCEDURE — 97140 MANUAL THERAPY 1/> REGIONS: CPT | Mod: PN

## 2025-03-24 NOTE — PROGRESS NOTES
"    Outpatient Rehab    Physical Therapy Visit    Patient Name: Terri Tavera  MRN: [id number]  YOB: 1990  Encounter Date: 3/24/2025    Therapy Diagnosis:   Encounter Diagnoses   Name Primary?    Decreased range of motion of intervertebral discs of cervical spine Yes    Upper extremity weakness        Physician: Self, Aaareferral    Physician Orders: Eval and Treat  Medical Diagnosis: Neck pain    Visit # / Visits Authorized:  5 / 20  Date of Evaluation: 3/3/2025  Insurance Authorization Period: 2/27/2025 to 12/31/2025  Plan of Care Certification:  3/3/2025 to 4/28/2025      Time In: 1145   Time Out: 1230  Total Time: 45   Total Billable Time: 45 minutes          Subjective   Patient reports feeling good today..  Pain reported as 2/10. L neck and shoulder    Objective            Treatment:  Therapeutic Exercise  TE 2: upper trap and levator stretch 2 x 20"  TE 4: pec stretch 3 x 20"  TE 5: UBE 3 minutes forward and backwards  Manual Therapy  MT 1: MFR of B levator, upper trap, suboccipitals (seated)  MT 4: IASTM to B upper trap in sitting  Balance/Neuromuscular Re-Education  NMR 1: SNAGs with rotation 5" x 10  NMR 2: SNAGs with extension x 10  NMR 3: supine horizontal abduction 2 x 10 with RTB  NMR 4: supine UE diagnols with RTB x 10  Therapeutic Activity  TA 1: FM rows 10# 2 x 10  TA 2: FM shoulder ext 7# 2 x 10  TA 3: FM shoulder adduction 7# 2 x 10    Time Entry(in minutes):  Manual Therapy Time Entry: 10  Neuromuscular Re-Education Time Entry: 15  Therapeutic Activity Time Entry: 10  Therapeutic Exercise Time Entry: 10    Assessment & Plan   Assessment: Patient presents with decreased pain today and is progressing well with therapy. Added PNF pattern exercise for neurodynamic strengthening.  Evaluation/Treatment Tolerance: Patient tolerated treatment well    Patient will continue to benefit from skilled outpatient physical therapy to address the deficits listed in the problem list box on initial " evaluation, provide pt/family education and to maximize pt's level of independence in the home and community environment.     Patient's spiritual, cultural, and educational needs considered and patient agreeable to plan of care and goals.           Plan: Continue current plan of care and therapy frequency    Goals:   Active       Functional outcome       Patient will show a significant change in FOTO patient-reported outcome tool to demonstrate subjective improvement       Start:  03/03/25    Expected End:  04/28/25            Patient will demonstrate independence in home program for support of progression       Start:  03/03/25    Expected End:  04/28/25               Pain       Patient will report pain of 6/10 at its worse demonstrating a reduction of overall pain       Start:  03/03/25    Expected End:  04/28/25               Range of Motion       Patient will achieve pain free cervical range of motion in all direction for maximal function.         Start:  03/03/25    Expected End:  04/28/25               Strength       Patient will achieve bilateral shoulder flexion strength of 5/5       Start:  03/03/25    Expected End:  04/28/25            Patient will achieve bilateral shoulder abduction strength of 5/5       Start:  03/03/25    Expected End:  04/28/25            Patient will achieve bilateral shoulder external rotation strength of 5/5 in neutral       Start:  03/03/25    Expected End:  04/28/25                Kathy Snyder PT

## 2025-03-26 ENCOUNTER — CLINICAL SUPPORT (OUTPATIENT)
Dept: REHABILITATION | Facility: HOSPITAL | Age: 35
End: 2025-03-26
Payer: COMMERCIAL

## 2025-03-26 DIAGNOSIS — M53.82 DECREASED RANGE OF MOTION OF INTERVERTEBRAL DISCS OF CERVICAL SPINE: Primary | ICD-10-CM

## 2025-03-26 DIAGNOSIS — R29.898 UPPER EXTREMITY WEAKNESS: ICD-10-CM

## 2025-03-26 PROCEDURE — 97110 THERAPEUTIC EXERCISES: CPT | Mod: PN

## 2025-03-26 PROCEDURE — 97140 MANUAL THERAPY 1/> REGIONS: CPT | Mod: PN

## 2025-03-26 PROCEDURE — 97112 NEUROMUSCULAR REEDUCATION: CPT | Mod: PN

## 2025-03-26 PROCEDURE — 97530 THERAPEUTIC ACTIVITIES: CPT | Mod: PN

## 2025-03-26 NOTE — PROGRESS NOTES
"    Outpatient Rehab    Physical Therapy Visit    Patient Name: Terri Tavera  MRN: [id number]  YOB: 1990  Encounter Date: 3/26/2025    Therapy Diagnosis:   Encounter Diagnoses   Name Primary?    Decreased range of motion of intervertebral discs of cervical spine Yes    Upper extremity weakness        Physician: Self, Aaareferral    Physician Orders: Eval and Treat  Medical Diagnosis: Neck pain    Visit # / Visits Authorized:  6 / 20  Date of Evaluation: 3/3/2025  Insurance Authorization Period: 2/27/2025 to 12/31/2025  Plan of Care Certification:  3/3/2025 to 4/28/2025      Time In: 1030   Time Out: 1115  Total Time: 45   Total Billable Time: 45 minutes          Subjective   Patient reports anterior neck tightness (scalenes and SCM).  Pain reported as 2/10. L neck and shoulder    Objective            Treatment:  Therapeutic Exercise  TE 2: upper trap and levator stretch 2 x 20"  TE 4: pec stretch 3 x 20"  TE 5: UBE 3 minutes forward and backwards  Manual Therapy  MT 1: MFR of B levator, upper trap, suboccipitals (seated)  MT 4: IASTM to B upper trap in sitting  Balance/Neuromuscular Re-Education  NMR 1: SNAGs with rotation 5" x 10  NMR 2: SNAGs with extension x 10  NMR 3: supine horizontal abduction 2 x 10 with RTB  NMR 4: supine UE diagnols with RTB x 10  NMR 5: B upper extremity external rotation with RTB 2 x 10  Therapeutic Activity  TA 1: FM rows 10# 2 x 10  TA 2: FM shoulder ext 7# 2 x 10  TA 3: FM shoulder adduction 7# 2 x 10    Time Entry(in minutes):  Manual Therapy Time Entry: 10  Neuromuscular Re-Education Time Entry: 15  Therapeutic Activity Time Entry: 10  Therapeutic Exercise Time Entry: 10    Assessment & Plan   Assessment: Patient is progressing well with therapy and has met 2 out of 7 goals. Patient will continue to benefit from farther postural stability and PNF training.  Evaluation/Treatment Tolerance: Patient tolerated treatment well    Patient will continue to benefit from skilled " outpatient physical therapy to address the deficits listed in the problem list box on initial evaluation, provide pt/family education and to maximize pt's level of independence in the home and community environment.     Patient's spiritual, cultural, and educational needs considered and patient agreeable to plan of care and goals.           Plan: Continue current plan of care and therapy frequency    Goals:   Active       Functional outcome       Patient will show a significant change in FOTO patient-reported outcome tool to demonstrate subjective improvement       Start:  03/03/25    Expected End:  04/28/25            Patient will demonstrate independence in home program for support of progression (Met)       Start:  03/03/25    Expected End:  04/28/25    Resolved:  03/26/25            Range of Motion       Patient will achieve pain free cervical range of motion in all direction for maximal function.         Start:  03/03/25    Expected End:  04/28/25               Strength       Patient will achieve bilateral shoulder flexion strength of 5/5       Start:  03/03/25    Expected End:  04/28/25            Patient will achieve bilateral shoulder abduction strength of 5/5       Start:  03/03/25    Expected End:  04/28/25            Patient will achieve bilateral shoulder external rotation strength of 5/5 in neutral       Start:  03/03/25    Expected End:  04/28/25              Resolved       Pain       Patient will report pain of 6/10 at its worse demonstrating a reduction of overall pain (Met)       Start:  03/03/25    Expected End:  04/28/25    Resolved:  03/26/25             Kathy Snyder PT

## 2025-03-27 ENCOUNTER — TELEPHONE (OUTPATIENT)
Dept: PHARMACY | Facility: CLINIC | Age: 35
End: 2025-03-27
Payer: COMMERCIAL

## 2025-03-27 NOTE — TELEPHONE ENCOUNTER
[facility]/Population Health Chart Review & Patient Outreach Details For Medication Adherence Project    Reason for Outreach Encounter: 3rd Party payor non-compliance report ([employer], [employer], C, etc)  2.  Patient Outreach Method: Reviewed patient chart   3.   Medication in question:    Diabetes Medications              semaglutide (RYBELSUS) 7 mg tablet Take 1 tablet (7 mg total) by mouth once daily.              Hold mounjaro 2/26/25    4.  Reviewed and or Updates Made To: Patient Chart  5. Outreach Outcomes and/or actions taken: Medication discontinued  Additional Notes:

## 2025-03-31 ENCOUNTER — CLINICAL SUPPORT (OUTPATIENT)
Dept: REHABILITATION | Facility: HOSPITAL | Age: 35
End: 2025-03-31
Payer: COMMERCIAL

## 2025-03-31 DIAGNOSIS — R29.898 UPPER EXTREMITY WEAKNESS: ICD-10-CM

## 2025-03-31 DIAGNOSIS — M53.82 DECREASED RANGE OF MOTION OF INTERVERTEBRAL DISCS OF CERVICAL SPINE: Primary | ICD-10-CM

## 2025-03-31 PROCEDURE — 97530 THERAPEUTIC ACTIVITIES: CPT | Mod: PN

## 2025-03-31 PROCEDURE — 97110 THERAPEUTIC EXERCISES: CPT | Mod: PN

## 2025-03-31 PROCEDURE — 97112 NEUROMUSCULAR REEDUCATION: CPT | Mod: PN

## 2025-03-31 PROCEDURE — 97140 MANUAL THERAPY 1/> REGIONS: CPT | Mod: PN

## 2025-03-31 NOTE — PROGRESS NOTES
"  Outpatient Rehab    Physical Therapy Visit    Patient Name: Terri Tavera  MRN: [id number]  YOB: 1990  Encounter Date: 3/31/2025    Therapy Diagnosis:   Encounter Diagnoses   Name Primary?    Decreased range of motion of intervertebral discs of cervical spine Yes    Upper extremity weakness      Physician: Self, Aaareferral    Physician Orders: Eval and Treat  Medical Diagnosis: Neck pain    Visit # / Visits Authorized:  7 / 20  Insurance Authorization Period: 2/27/2025 to 12/31/2025  Date of Evaluation: 3/3/2025  Plan of Care Certification: 3/3/2025 to 4/28/2025      Time In: 1105   Time Out: 1145  Total Time: 40   Total Billable Time:   40 minutes              Subjective   Patient reports R low back pain..  Pain reported as 6/10. R low back    Objective            Treatment:  Therapeutic Exercise  TE 1: chin tucks 5" x 20  TE 2: upper trap and levator stretch 2 x 20"  TE 4: pec stretch 3 x 20"  TE 5: UBE 3 minutes forward and backwards  TE 6: LTR x 2 min  TE 7: piriformis stretch 2 x 30"  Manual Therapy  MT 1: MFR of R piriformis and sacral musculature  Balance/Neuromuscular Re-Education  NMR 3: supine horizontal abduction 2 x 10 with RTB  NMR 4: supine UE diagnols with RTB x 10  NMR 5: B upper extremity external rotation with RTB 2 x 10  Therapeutic Activity  TA 1: FM rows 10# 2 x 10  TA 2: FM shoulder ext 7# 2 x 10  TA 3: FM shoulder adduction 7# 2 x 10    Time Entry(in minutes):  Manual Therapy Time Entry: 10  Neuromuscular Re-Education Time Entry: 8  Therapeutic Activity Time Entry: 8  Therapeutic Exercise Time Entry: 14    Assessment & Plan   Assessment: Patient presents with no neck and shoulder pain today, but with back pain. Plan to decreased frequency next week.  Evaluation/Treatment Tolerance: Patient tolerated treatment well    Patient will continue to benefit from skilled outpatient physical therapy to address the deficits listed in the problem list box on initial evaluation, provide " pt/family education and to maximize pt's level of independence in the home and community environment.     Patient's spiritual, cultural, and educational needs considered and patient agreeable to plan of care and goals.           Plan: Continue twice a week frequency this week and then switch to once a week for two weeks to progress towards discharge.    Goals:   Active       Functional outcome       Patient will show a significant change in FOTO patient-reported outcome tool to demonstrate subjective improvement       Start:  03/03/25    Expected End:  04/28/25            Patient will demonstrate independence in home program for support of progression (Met)       Start:  03/03/25    Expected End:  04/28/25    Resolved:  03/26/25            Range of Motion       Patient will achieve pain free cervical range of motion in all direction for maximal function.         Start:  03/03/25    Expected End:  04/28/25               Strength       Patient will achieve bilateral shoulder flexion strength of 5/5       Start:  03/03/25    Expected End:  04/28/25            Patient will achieve bilateral shoulder abduction strength of 5/5       Start:  03/03/25    Expected End:  04/28/25            Patient will achieve bilateral shoulder external rotation strength of 5/5 in neutral       Start:  03/03/25    Expected End:  04/28/25              Resolved       Pain       Patient will report pain of 6/10 at its worse demonstrating a reduction of overall pain (Met)       Start:  03/03/25    Expected End:  04/28/25    Resolved:  03/26/25             Kathy Snyder PT

## 2025-03-31 NOTE — PROGRESS NOTES
"    Outpatient Rehab    Physical Therapy Visit    Patient Name: Terri Tavera  MRN: [id number]  YOB: 1990  Encounter Date: 3/31/2025    Therapy Diagnosis:   Encounter Diagnoses   Name Primary?    Decreased range of motion of intervertebral discs of cervical spine Yes    Upper extremity weakness          Physician: Self, Aaareferral    Physician Orders: Eval and Treat  Medical Diagnosis: Neck pain    Visit # / Visits Authorized:  7 / 20  Date of Evaluation: 3/3/2025  Insurance Authorization Period: 2/27/2025 to 12/31/2025  Plan of Care Certification:  3/3/2025 to 4/28/2025      Time In: 1105   Time Out: 1145  Total Time: 40   Total Billable Time: 40 minutes          Subjective   Patient reports R low back pain..  Pain reported as 6/10. R low back    Objective            Treatment:  Therapeutic Exercise  TE 1: chin tucks 5" x 20  TE 2: upper trap and levator stretch 2 x 20"  TE 4: pec stretch 3 x 20"  TE 5: UBE 3 minutes forward and backwards  TE 6: LTR x 2 min  TE 7: piriformis stretch 2 x 30"  Manual Therapy  MT 1: MFR of R piriformis and sacral musculature  Balance/Neuromuscular Re-Education  NMR 3: supine horizontal abduction 2 x 10 with RTB  NMR 4: supine UE diagnols with RTB x 10  NMR 5: B upper extremity external rotation with RTB 2 x 10  Therapeutic Activity  TA 1: FM rows 10# 2 x 10  TA 2: FM shoulder ext 7# 2 x 10  TA 3: FM shoulder adduction 7# 2 x 10    Time Entry(in minutes):  Manual Therapy Time Entry: 10  Neuromuscular Re-Education Time Entry: 8  Therapeutic Activity Time Entry: 8  Therapeutic Exercise Time Entry: 14    Assessment & Plan   Assessment: Patient presents with no neck and shoulder pain today, but with back pain. Plan to decreased frequency next week.  Evaluation/Treatment Tolerance: Patient tolerated treatment well    Patient will continue to benefit from skilled outpatient physical therapy to address the deficits listed in the problem list box on initial evaluation, provide " pt/family education and to maximize pt's level of independence in the home and community environment.     Patient's spiritual, cultural, and educational needs considered and patient agreeable to plan of care and goals.           Plan: Continue twice a week frequency this week and then switch to once a week for two weeks to progress towards discharge.    Goals:   Active       Functional outcome       Patient will show a significant change in FOTO patient-reported outcome tool to demonstrate subjective improvement       Start:  03/03/25    Expected End:  04/28/25            Patient will demonstrate independence in home program for support of progression (Met)       Start:  03/03/25    Expected End:  04/28/25    Resolved:  03/26/25            Range of Motion       Patient will achieve pain free cervical range of motion in all direction for maximal function.         Start:  03/03/25    Expected End:  04/28/25               Strength       Patient will achieve bilateral shoulder flexion strength of 5/5       Start:  03/03/25    Expected End:  04/28/25            Patient will achieve bilateral shoulder abduction strength of 5/5       Start:  03/03/25    Expected End:  04/28/25            Patient will achieve bilateral shoulder external rotation strength of 5/5 in neutral       Start:  03/03/25    Expected End:  04/28/25              Resolved       Pain       Patient will report pain of 6/10 at its worse demonstrating a reduction of overall pain (Met)       Start:  03/03/25    Expected End:  04/28/25    Resolved:  03/26/25             Kathy Snyder PT

## 2025-04-04 ENCOUNTER — TELEPHONE (OUTPATIENT)
Dept: PHARMACY | Facility: CLINIC | Age: 35
End: 2025-04-04
Payer: COMMERCIAL

## 2025-04-04 NOTE — TELEPHONE ENCOUNTER
[facility]/Population Health Chart Review & Patient Outreach Details For Medication Adherence Project    Reason for Outreach Encounter: 3rd Party payor non-compliance report ([employer], [employer], C, etc)  2.  Patient Outreach Method: Reviewed patient chart   3.   Medication in question:    Diabetes Medications              semaglutide (RYBELSUS) 7 mg tablet Take 1 tablet (7 mg total) by mouth once daily.                 Mounjaro d/shankar 2/26/25    4.  Reviewed and or Updates Made To: Patient Chart  5. Outreach Outcomes and/or actions taken: Medication discontinued  Additional Notes:

## 2025-04-07 ENCOUNTER — CLINICAL SUPPORT (OUTPATIENT)
Dept: REHABILITATION | Facility: HOSPITAL | Age: 35
End: 2025-04-07
Payer: COMMERCIAL

## 2025-04-07 DIAGNOSIS — M53.82 DECREASED RANGE OF MOTION OF INTERVERTEBRAL DISCS OF CERVICAL SPINE: Primary | ICD-10-CM

## 2025-04-07 DIAGNOSIS — R29.898 UPPER EXTREMITY WEAKNESS: ICD-10-CM

## 2025-04-07 PROCEDURE — 97530 THERAPEUTIC ACTIVITIES: CPT | Mod: PN

## 2025-04-07 PROCEDURE — 97140 MANUAL THERAPY 1/> REGIONS: CPT | Mod: PN

## 2025-04-07 PROCEDURE — 97112 NEUROMUSCULAR REEDUCATION: CPT | Mod: PN

## 2025-04-07 PROCEDURE — 97110 THERAPEUTIC EXERCISES: CPT | Mod: PN

## 2025-04-07 NOTE — PROGRESS NOTES
"  Outpatient Rehab    Physical Therapy Visit    Patient Name: Terri Tavera  MRN: [id number]  YOB: 1990  Encounter Date: 4/7/2025    Therapy Diagnosis:   Encounter Diagnoses   Name Primary?    Decreased range of motion of intervertebral discs of cervical spine Yes    Upper extremity weakness        Physician: Self, Aaareferral    Physician Orders: Eval and Treat  Medical Diagnosis: Neck pain    Visit # / Visits Authorized:  8 / 20  Insurance Authorization Period: 2/27/2025 to 12/31/2025  Date of Evaluation: 3/3/2025  Plan of Care Certification: 3/3/2025 to 4/28/2025      Time In: 1145   Time Out: 1230  Total Time: 45   Total Billable Time:   45 minutes              Subjective   Patient reports no neck pain today, but 6/10 back pain..  Pain reported as 0/10. neck    Objective            Treatment:  Therapeutic Exercise  TE 1: chin tucks 5" x 20  TE 2: upper trap and levator stretch 2 x 30"  TE 4: pec stretch 3 x 30"  TE 5: UBE 3 minutes forward and backwards  TE 6: LTR x 2 min  TE 7: piriformis stretch 2 x 30"  Manual Therapy  MT 1: MFR of R piriformis and sacral musculature  Balance/Neuromuscular Re-Education  NMR 3: supine horizontal abduction 2 x 10 with RTB  NMR 4: supine UE diagnols with RTB x 10  NMR 5: B upper extremity external rotation with RTB 2 x 10  Therapeutic Activity  TA 1: FM rows 10# 2 x 10  TA 2: FM shoulder ext 7# 2 x 10  TA 3: FM shoulder adduction 7# 2 x 10    Time Entry(in minutes):  Manual Therapy Time Entry: 10  Neuromuscular Re-Education Time Entry: 10  Therapeutic Activity Time Entry: 10  Therapeutic Exercise Time Entry: 15    Assessment & Plan   Assessment: Patient presents with no neck and shoulder pain today, but with back pain. Decreased frequency to once a week. Patient is progressing well with therapy; plan to discharge patient for neck pain next visit.  Evaluation/Treatment Tolerance: Patient tolerated treatment well    Patient will continue to benefit from skilled " outpatient physical therapy to address the deficits listed in the problem list box on initial evaluation, provide pt/family education and to maximize pt's level of independence in the home and community environment.     Patient's spiritual, cultural, and educational needs considered and patient agreeable to plan of care and goals.           Plan: Plan to discharge next week.    Goals:   Active       Functional outcome       Patient will show a significant change in FOTO patient-reported outcome tool to demonstrate subjective improvement (Progressing)       Start:  03/03/25    Expected End:  04/28/25            Patient will demonstrate independence in home program for support of progression (Met)       Start:  03/03/25    Expected End:  04/28/25    Resolved:  03/26/25            Range of Motion       Patient will achieve pain free cervical range of motion in all direction for maximal function.   (Progressing)       Start:  03/03/25    Expected End:  04/28/25               Strength       Patient will achieve bilateral shoulder flexion strength of 5/5 (Progressing)       Start:  03/03/25    Expected End:  04/28/25            Patient will achieve bilateral shoulder abduction strength of 5/5 (Progressing)       Start:  03/03/25    Expected End:  04/28/25            Patient will achieve bilateral shoulder external rotation strength of 5/5 in neutral (Progressing)       Start:  03/03/25    Expected End:  04/28/25              Resolved       Pain       Patient will report pain of 6/10 at its worse demonstrating a reduction of overall pain (Met)       Start:  03/03/25    Expected End:  04/28/25    Resolved:  03/26/25             Kathy Snyder PT

## 2025-04-14 ENCOUNTER — CLINICAL SUPPORT (OUTPATIENT)
Dept: REHABILITATION | Facility: HOSPITAL | Age: 35
End: 2025-04-14
Payer: COMMERCIAL

## 2025-04-14 DIAGNOSIS — M53.82 DECREASED RANGE OF MOTION OF INTERVERTEBRAL DISCS OF CERVICAL SPINE: Primary | ICD-10-CM

## 2025-04-14 DIAGNOSIS — R29.898 UPPER EXTREMITY WEAKNESS: ICD-10-CM

## 2025-04-14 PROCEDURE — 97110 THERAPEUTIC EXERCISES: CPT | Mod: PN

## 2025-04-14 PROCEDURE — 97140 MANUAL THERAPY 1/> REGIONS: CPT | Mod: PN

## 2025-04-14 PROCEDURE — 97530 THERAPEUTIC ACTIVITIES: CPT | Mod: PN

## 2025-04-14 PROCEDURE — 97112 NEUROMUSCULAR REEDUCATION: CPT | Mod: PN

## 2025-04-14 NOTE — PROGRESS NOTES
"  Outpatient Rehab    Physical Therapy Discharge    Patient Name: Terri Tavera  MRN: [id number]  YOB: 1990  Encounter Date: 4/14/2025    Therapy Diagnosis:   Encounter Diagnoses   Name Primary?    Decreased range of motion of intervertebral discs of cervical spine Yes    Upper extremity weakness      Physician: Self, Aaareferral    Physician Orders: Eval and Treat  Medical Diagnosis: Neck pain    Visit # / Visits Authorized:  9 / 20  Insurance Authorization Period: 2/27/2025 to 12/31/2025  Date of Evaluation: 3/3/2025  Plan of Care Certification: 3/3/2025 to 4/28/2025      Time In: 1103   Time Out: 1148  Total Time: 45   Total Billable Time:   45 minutes     FOTO:  Intake Score: 47%  Survey Score 1: 69%         Subjective   Patient reports ready for discharge today..  Pain reported as 0/10. neck    Objective      Cervical range of motion: with in normal limits       Treatment:  Therapeutic Exercise  TE 1: chin tucks 5" x 20  TE 2: upper trap and levator stretch 2 x 30"  TE 4: pec stretch 3 x 30"  TE 5: UBE 3 minutes forward and backwards  TE 6: LTR x 2 min  TE 7: piriformis stretch 2 x 30"  Manual Therapy  MT 1: MFR of R piriformis and sacral musculature  Balance/Neuromuscular Re-Education  NMR 3: supine horizontal abduction 2 x 10 with RTB  NMR 4: supine UE diagnols with RTB x 10  NMR 5: B upper extremity external rotation with RTB 2 x 10  Therapeutic Activity  TA 1: FM rows 10# 2 x 10  TA 2: FM shoulder ext 7# 2 x 10  TA 3: FM shoulder adduction 7# 2 x 10    Time Entry(in minutes):  Manual Therapy Time Entry: 10  Neuromuscular Re-Education Time Entry: 10  Therapeutic Activity Time Entry: 10  Therapeutic Exercise Time Entry: 15    Assessment & Plan   Assessment: Patient has met all of her goals and is being discharged from therapy.  Evaluation/Treatment Tolerance: Patient tolerated treatment well    Patient's spiritual, cultural, and educational needs considered and patient agreeable to plan of care " and goals.           Plan: Discharge from therapy    Goals:   Active       Strength       Patient will achieve bilateral shoulder flexion strength of 5/5 (Met)       Start:  03/03/25    Expected End:  04/28/25    Resolved:  04/14/25         Patient will achieve bilateral shoulder abduction strength of 5/5 (Met)       Start:  03/03/25    Expected End:  04/28/25    Resolved:  04/14/25         Patient will achieve bilateral shoulder external rotation strength of 5/5 in neutral (Adequate for Care Transition)       Start:  03/03/25    Expected End:  04/28/25              Resolved       Functional outcome       Patient will show a significant change in FOTO patient-reported outcome tool to demonstrate subjective improvement (Met)       Start:  03/03/25    Expected End:  04/28/25    Resolved:  04/14/25         Patient will demonstrate independence in home program for support of progression (Met)       Start:  03/03/25    Expected End:  04/28/25    Resolved:  03/26/25            Pain       Patient will report pain of 6/10 at its worse demonstrating a reduction of overall pain (Met)       Start:  03/03/25    Expected End:  04/28/25    Resolved:  03/26/25            Range of Motion       Patient will achieve pain free cervical range of motion in all direction for maximal function.   (Met)       Start:  03/03/25    Expected End:  04/28/25    Resolved:  04/14/25             Kathy Snyder PT

## 2025-05-10 ENCOUNTER — TELEPHONE (OUTPATIENT)
Dept: PHARMACY | Facility: CLINIC | Age: 35
End: 2025-05-10
Payer: COMMERCIAL

## 2025-05-27 ENCOUNTER — PATIENT OUTREACH (OUTPATIENT)
Dept: ADMINISTRATIVE | Facility: HOSPITAL | Age: 35
End: 2025-05-27
Payer: COMMERCIAL

## 2025-06-11 ENCOUNTER — LAB VISIT (OUTPATIENT)
Dept: LAB | Facility: HOSPITAL | Age: 35
End: 2025-06-11
Attending: DERMATOLOGY
Payer: COMMERCIAL

## 2025-06-11 ENCOUNTER — OFFICE VISIT (OUTPATIENT)
Dept: DERMATOLOGY | Facility: CLINIC | Age: 35
End: 2025-06-11
Payer: COMMERCIAL

## 2025-06-11 DIAGNOSIS — L65.8 FEMALE PATTERN HAIR LOSS: Primary | ICD-10-CM

## 2025-06-11 DIAGNOSIS — L65.8 FEMALE PATTERN HAIR LOSS: ICD-10-CM

## 2025-06-11 LAB
ABSOLUTE EOSINOPHIL (OHS): 0.1 K/UL
ABSOLUTE MONOCYTE (OHS): 0.55 K/UL (ref 0.3–1)
ABSOLUTE NEUTROPHIL COUNT (OHS): 5.03 K/UL (ref 1.8–7.7)
ALBUMIN SERPL BCP-MCNC: 4.5 G/DL (ref 3.5–5.2)
ALP SERPL-CCNC: 73 UNIT/L (ref 40–150)
ALT SERPL W/O P-5'-P-CCNC: 21 UNIT/L (ref 10–44)
ANION GAP (OHS): 16 MMOL/L (ref 8–16)
AST SERPL-CCNC: 26 UNIT/L (ref 11–45)
BASOPHILS # BLD AUTO: 0.07 K/UL
BASOPHILS NFR BLD AUTO: 0.9 %
BILIRUB SERPL-MCNC: 1.1 MG/DL (ref 0.1–1)
BUN SERPL-MCNC: 13 MG/DL (ref 6–20)
CALCIUM SERPL-MCNC: 9.3 MG/DL (ref 8.7–10.5)
CHLORIDE SERPL-SCNC: 104 MMOL/L (ref 95–110)
CO2 SERPL-SCNC: 19 MMOL/L (ref 23–29)
CREAT SERPL-MCNC: 0.7 MG/DL (ref 0.5–1.4)
ERYTHROCYTE [DISTWIDTH] IN BLOOD BY AUTOMATED COUNT: 12 % (ref 11.5–14.5)
FOLATE SERPL-MCNC: 13.5 NG/ML (ref 4–24)
GFR SERPLBLD CREATININE-BSD FMLA CKD-EPI: >60 ML/MIN/1.73/M2
GLUCOSE SERPL-MCNC: 81 MG/DL (ref 70–110)
HCT VFR BLD AUTO: 41.3 % (ref 37–48.5)
HGB BLD-MCNC: 15.1 GM/DL (ref 12–16)
IMM GRANULOCYTES # BLD AUTO: 0.03 K/UL (ref 0–0.04)
IMM GRANULOCYTES NFR BLD AUTO: 0.4 % (ref 0–0.5)
LYMPHOCYTES # BLD AUTO: 2.1 K/UL (ref 1–4.8)
MCH RBC QN AUTO: 32.1 PG (ref 27–31)
MCHC RBC AUTO-ENTMCNC: 36.6 G/DL (ref 32–36)
MCV RBC AUTO: 88 FL (ref 82–98)
NUCLEATED RBC (/100WBC) (OHS): 0 /100 WBC
PLATELET # BLD AUTO: 316 K/UL (ref 150–450)
PMV BLD AUTO: 10 FL (ref 9.2–12.9)
POTASSIUM SERPL-SCNC: 4.1 MMOL/L (ref 3.5–5.1)
PROT SERPL-MCNC: 7.5 GM/DL (ref 6–8.4)
RBC # BLD AUTO: 4.71 M/UL (ref 4–5.4)
RELATIVE EOSINOPHIL (OHS): 1.3 %
RELATIVE LYMPHOCYTE (OHS): 26.6 % (ref 18–48)
RELATIVE MONOCYTE (OHS): 7 % (ref 4–15)
RELATIVE NEUTROPHIL (OHS): 63.8 % (ref 38–73)
SODIUM SERPL-SCNC: 139 MMOL/L (ref 136–145)
T4 FREE SERPL-MCNC: 1.13 NG/DL (ref 0.71–1.51)
THYROGLOB AB SERPL IA-ACNC: <4 IU/ML
THYROPEROXIDASE IGG SERPL-ACNC: <6 IU/ML
TSH SERPL-ACNC: 0.64 UIU/ML (ref 0.4–4)
VIT B12 SERPL-MCNC: 381 PG/ML (ref 210–950)
WBC # BLD AUTO: 7.88 K/UL (ref 3.9–12.7)

## 2025-06-11 PROCEDURE — 99999 PR PBB SHADOW E&M-EST. PATIENT-LVL III: CPT | Mod: PBBFAC,,, | Performed by: DERMATOLOGY

## 2025-06-11 PROCEDURE — 84443 ASSAY THYROID STIM HORMONE: CPT

## 2025-06-11 PROCEDURE — 36415 COLL VENOUS BLD VENIPUNCTURE: CPT

## 2025-06-11 PROCEDURE — 99214 OFFICE O/P EST MOD 30 MIN: CPT | Mod: S$GLB,,, | Performed by: DERMATOLOGY

## 2025-06-11 PROCEDURE — 82607 VITAMIN B-12: CPT

## 2025-06-11 PROCEDURE — 1160F RVW MEDS BY RX/DR IN RCRD: CPT | Mod: CPTII,S$GLB,, | Performed by: DERMATOLOGY

## 2025-06-11 PROCEDURE — 1159F MED LIST DOCD IN RCRD: CPT | Mod: CPTII,S$GLB,, | Performed by: DERMATOLOGY

## 2025-06-11 PROCEDURE — 86800 THYROGLOBULIN ANTIBODY: CPT

## 2025-06-11 PROCEDURE — 84439 ASSAY OF FREE THYROXINE: CPT

## 2025-06-11 PROCEDURE — G2211 COMPLEX E/M VISIT ADD ON: HCPCS | Mod: S$GLB,,, | Performed by: DERMATOLOGY

## 2025-06-11 PROCEDURE — 3044F HG A1C LEVEL LT 7.0%: CPT | Mod: CPTII,S$GLB,, | Performed by: DERMATOLOGY

## 2025-06-11 PROCEDURE — 86376 MICROSOMAL ANTIBODY EACH: CPT

## 2025-06-11 PROCEDURE — 82040 ASSAY OF SERUM ALBUMIN: CPT

## 2025-06-11 PROCEDURE — 82746 ASSAY OF FOLIC ACID SERUM: CPT

## 2025-06-11 PROCEDURE — 85025 COMPLETE CBC W/AUTO DIFF WBC: CPT

## 2025-06-11 RX ORDER — MINOXIDIL 5 %
SOLUTION, NON-ORAL TOPICAL
Qty: 60 ML | Refills: 11 | Status: SHIPPED | OUTPATIENT
Start: 2025-06-11

## 2025-06-11 NOTE — PROGRESS NOTES
Subjective:      Patient ID:  Terri Tavera is a 35 y.o. female who presents for   Chief Complaint   Patient presents with    Hair/Scalp Problem     Pt reports for hair loss on the scalp, currently treating with OTC Biotin with no improvement.      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 + thinning worsened with post-partum for twins.      TSH and CBC wnl 10/23/23    Mother x/ hx of Hashimoto's    Hair/Scalp Problem        Review of Systems   Constitutional:  Negative for fever and chills.   Gastrointestinal:  Negative for nausea and vomiting.   Skin:  Positive for activity-related sunscreen use. Negative for daily sunscreen use and recent sunburn.   Hematologic/Lymphatic: Does not bruise/bleed easily.       Objective:   Physical Exam   Constitutional: She appears well-developed and well-nourished. No distress.   Neurological: She is alert and oriented to person, place, and time. She is not disoriented.   Psychiatric: She has a normal mood and affect.   Skin:   Areas Examined (abnormalities noted in diagram):   Scalp / Hair Palpated and Inspected  Head / Face Inspection Performed  Neck Inspection Performed  RUE Inspected  LUE Inspection Performed  Nails and Digits Inspection Performed                            Assessment / Plan:        Female pattern hair loss  -     CBC Auto Differential; Future; Expected date: 06/11/2025  -     Comprehensive Metabolic Panel; Future; Expected date: 06/11/2025  -     TSH; Future; Expected date: 06/11/2025  -     T4, Free; Future; Expected date: 06/11/2025  -     Folate; Future; Expected date: 06/11/2025  -     Vitamin B12; Future; Expected date: 06/11/2025  -     Thyroid Peroxidase Antibody; Future; Expected date: 06/11/2025  -     Anti-Thyroglobulin Antibody; Future; Expected date: 06/11/2025  -     minoxidiL 5 % Soln; Apply to scalp once daily. Do not allow to drip onto face.  Dispense: 60 mL; Refill: 11 -      photodocumentation done today. Will check labs given mother c/ hx of Hashimoto's.  Will start minoxidil 8%. Pt defers oral minoxidil at this time. The patient acknowledged understanding.              Follow up in about 6 months (around 12/11/2025).

## 2025-06-12 ENCOUNTER — RESULTS FOLLOW-UP (OUTPATIENT)
Dept: DERMATOLOGY | Facility: CLINIC | Age: 35
End: 2025-06-12

## 2025-07-08 ENCOUNTER — PATIENT OUTREACH (OUTPATIENT)
Dept: ADMINISTRATIVE | Facility: HOSPITAL | Age: 35
End: 2025-07-08
Payer: COMMERCIAL

## 2025-07-08 DIAGNOSIS — E11.65 TYPE 2 DIABETES MELLITUS WITH HYPERGLYCEMIA, WITHOUT LONG-TERM CURRENT USE OF INSULIN: ICD-10-CM

## 2025-07-09 ENCOUNTER — LAB VISIT (OUTPATIENT)
Dept: LAB | Facility: HOSPITAL | Age: 35
End: 2025-07-09
Attending: PHYSICIAN ASSISTANT
Payer: COMMERCIAL

## 2025-07-09 DIAGNOSIS — Z00.00 ENCOUNTER FOR ANNUAL PHYSICAL EXAM: ICD-10-CM

## 2025-07-09 DIAGNOSIS — E11.65 TYPE 2 DIABETES MELLITUS WITH HYPERGLYCEMIA, WITHOUT LONG-TERM CURRENT USE OF INSULIN: ICD-10-CM

## 2025-07-09 LAB
ABSOLUTE EOSINOPHIL (OHS): 0.12 K/UL
ABSOLUTE MONOCYTE (OHS): 0.5 K/UL (ref 0.3–1)
ABSOLUTE NEUTROPHIL COUNT (OHS): 5.38 K/UL (ref 1.8–7.7)
ALBUMIN SERPL BCP-MCNC: 4.2 G/DL (ref 3.5–5.2)
ALBUMIN/CREAT UR: 10.3 UG/MG
ALP SERPL-CCNC: 71 UNIT/L (ref 40–150)
ALT SERPL W/O P-5'-P-CCNC: 37 UNIT/L (ref 10–44)
ANION GAP (OHS): 5 MMOL/L (ref 8–16)
AST SERPL-CCNC: 26 UNIT/L (ref 11–45)
BASOPHILS # BLD AUTO: 0.04 K/UL
BASOPHILS NFR BLD AUTO: 0.5 %
BILIRUB SERPL-MCNC: 0.9 MG/DL (ref 0.1–1)
BUN SERPL-MCNC: 11 MG/DL (ref 6–20)
CALCIUM SERPL-MCNC: 8.7 MG/DL (ref 8.7–10.5)
CHLORIDE SERPL-SCNC: 104 MMOL/L (ref 95–110)
CHOLEST SERPL-MCNC: 157 MG/DL (ref 120–199)
CHOLEST/HDLC SERPL: 3.3 {RATIO} (ref 2–5)
CO2 SERPL-SCNC: 28 MMOL/L (ref 23–29)
CREAT SERPL-MCNC: 0.6 MG/DL (ref 0.5–1.4)
CREAT UR-MCNC: 175 MG/DL (ref 15–325)
EAG (OHS): 100 MG/DL (ref 68–131)
ERYTHROCYTE [DISTWIDTH] IN BLOOD BY AUTOMATED COUNT: 12.1 % (ref 11.5–14.5)
GFR SERPLBLD CREATININE-BSD FMLA CKD-EPI: >60 ML/MIN/1.73/M2
GLUCOSE SERPL-MCNC: 107 MG/DL (ref 70–110)
HBA1C MFR BLD: 5.1 % (ref 4–5.6)
HCT VFR BLD AUTO: 39.1 % (ref 37–48.5)
HDLC SERPL-MCNC: 48 MG/DL (ref 40–75)
HDLC SERPL: 30.6 % (ref 20–50)
HGB BLD-MCNC: 13.3 GM/DL (ref 12–16)
IMM GRANULOCYTES # BLD AUTO: 0.02 K/UL (ref 0–0.04)
IMM GRANULOCYTES NFR BLD AUTO: 0.3 % (ref 0–0.5)
LDLC SERPL CALC-MCNC: 94 MG/DL (ref 63–159)
LYMPHOCYTES # BLD AUTO: 1.82 K/UL (ref 1–4.8)
MCH RBC QN AUTO: 29.7 PG (ref 27–31)
MCHC RBC AUTO-ENTMCNC: 34 G/DL (ref 32–36)
MCV RBC AUTO: 87 FL (ref 82–98)
MICROALBUMIN UR-MCNC: 18 UG/ML (ref ?–5000)
NONHDLC SERPL-MCNC: 109 MG/DL
NUCLEATED RBC (/100WBC) (OHS): 0 /100 WBC
PLATELET # BLD AUTO: 246 K/UL (ref 150–450)
PMV BLD AUTO: 9.7 FL (ref 9.2–12.9)
POTASSIUM SERPL-SCNC: 4.3 MMOL/L (ref 3.5–5.1)
PROT SERPL-MCNC: 6.8 GM/DL (ref 6–8.4)
RBC # BLD AUTO: 4.48 M/UL (ref 4–5.4)
RELATIVE EOSINOPHIL (OHS): 1.5 %
RELATIVE LYMPHOCYTE (OHS): 23.1 % (ref 18–48)
RELATIVE MONOCYTE (OHS): 6.3 % (ref 4–15)
RELATIVE NEUTROPHIL (OHS): 68.3 % (ref 38–73)
SODIUM SERPL-SCNC: 137 MMOL/L (ref 136–145)
TRIGL SERPL-MCNC: 75 MG/DL (ref 30–150)
WBC # BLD AUTO: 7.88 K/UL (ref 3.9–12.7)

## 2025-07-09 PROCEDURE — 83036 HEMOGLOBIN GLYCOSYLATED A1C: CPT

## 2025-07-09 PROCEDURE — 82040 ASSAY OF SERUM ALBUMIN: CPT

## 2025-07-09 PROCEDURE — 82570 ASSAY OF URINE CREATININE: CPT

## 2025-07-09 PROCEDURE — 85025 COMPLETE CBC W/AUTO DIFF WBC: CPT

## 2025-07-09 PROCEDURE — 36415 COLL VENOUS BLD VENIPUNCTURE: CPT | Mod: PN

## 2025-07-09 PROCEDURE — 80061 LIPID PANEL: CPT

## 2025-07-14 ENCOUNTER — OFFICE VISIT (OUTPATIENT)
Dept: INTERNAL MEDICINE | Facility: CLINIC | Age: 35
End: 2025-07-14
Payer: COMMERCIAL

## 2025-07-14 VITALS
TEMPERATURE: 98 F | BODY MASS INDEX: 28.95 KG/M2 | SYSTOLIC BLOOD PRESSURE: 104 MMHG | WEIGHT: 163.38 LBS | HEART RATE: 92 BPM | HEIGHT: 63 IN | DIASTOLIC BLOOD PRESSURE: 62 MMHG | OXYGEN SATURATION: 98 %

## 2025-07-14 DIAGNOSIS — E66.3 OVERWEIGHT (BMI 25.0-29.9): ICD-10-CM

## 2025-07-14 DIAGNOSIS — L65.9 HAIR LOSS DISORDER: ICD-10-CM

## 2025-07-14 DIAGNOSIS — F41.9 ANXIETY AND DEPRESSION: ICD-10-CM

## 2025-07-14 DIAGNOSIS — Z00.00 ENCOUNTER FOR ANNUAL PHYSICAL EXAM: ICD-10-CM

## 2025-07-14 DIAGNOSIS — Z86.32 HISTORY OF GESTATIONAL DIABETES: Primary | ICD-10-CM

## 2025-07-14 DIAGNOSIS — M53.82 DECREASED RANGE OF MOTION OF INTERVERTEBRAL DISCS OF CERVICAL SPINE: ICD-10-CM

## 2025-07-14 DIAGNOSIS — F32.A ANXIETY AND DEPRESSION: ICD-10-CM

## 2025-07-14 DIAGNOSIS — M25.462 EFFUSION, LEFT KNEE: ICD-10-CM

## 2025-07-14 DIAGNOSIS — Z85.6 HISTORY OF ACUTE LYMPHOBLASTIC LEUKEMIA (ALL): ICD-10-CM

## 2025-07-14 PROBLEM — E09.9 STEROID-INDUCED DIABETES MELLITUS (CORRECT AND PROPERLY ADMINISTERED): Status: RESOLVED | Noted: 2023-09-25 | Resolved: 2025-07-14

## 2025-07-14 PROBLEM — T38.0X5A STEROID-INDUCED DIABETES MELLITUS (CORRECT AND PROPERLY ADMINISTERED): Status: RESOLVED | Noted: 2023-09-25 | Resolved: 2025-07-14

## 2025-07-14 PROCEDURE — 1160F RVW MEDS BY RX/DR IN RCRD: CPT | Mod: CPTII,S$GLB,, | Performed by: PHYSICIAN ASSISTANT

## 2025-07-14 PROCEDURE — 3044F HG A1C LEVEL LT 7.0%: CPT | Mod: CPTII,S$GLB,, | Performed by: PHYSICIAN ASSISTANT

## 2025-07-14 PROCEDURE — 3061F NEG MICROALBUMINURIA REV: CPT | Mod: CPTII,S$GLB,, | Performed by: PHYSICIAN ASSISTANT

## 2025-07-14 PROCEDURE — 3078F DIAST BP <80 MM HG: CPT | Mod: CPTII,S$GLB,, | Performed by: PHYSICIAN ASSISTANT

## 2025-07-14 PROCEDURE — 3066F NEPHROPATHY DOC TX: CPT | Mod: CPTII,S$GLB,, | Performed by: PHYSICIAN ASSISTANT

## 2025-07-14 PROCEDURE — 3074F SYST BP LT 130 MM HG: CPT | Mod: CPTII,S$GLB,, | Performed by: PHYSICIAN ASSISTANT

## 2025-07-14 PROCEDURE — 99395 PREV VISIT EST AGE 18-39: CPT | Mod: S$GLB,,, | Performed by: PHYSICIAN ASSISTANT

## 2025-07-14 PROCEDURE — 1159F MED LIST DOCD IN RCRD: CPT | Mod: CPTII,S$GLB,, | Performed by: PHYSICIAN ASSISTANT

## 2025-07-14 PROCEDURE — 3008F BODY MASS INDEX DOCD: CPT | Mod: CPTII,S$GLB,, | Performed by: PHYSICIAN ASSISTANT

## 2025-07-14 PROCEDURE — 99999 PR PBB SHADOW E&M-EST. PATIENT-LVL IV: CPT | Mod: PBBFAC,,, | Performed by: PHYSICIAN ASSISTANT

## 2025-07-14 RX ORDER — METHOCARBAMOL 750 MG/1
750 TABLET, FILM COATED ORAL 3 TIMES DAILY PRN
Qty: 40 TABLET | Refills: 1 | Status: SHIPPED | OUTPATIENT
Start: 2025-07-14 | End: 2025-08-10

## 2025-07-14 NOTE — PROGRESS NOTES
"  Subjective:      Patient ID: Terri Tavera is a 35 y.o. female.    Chief Complaint: Follow-up    HPI  History of Present Illness    CHIEF COMPLAINT:  Ms. Tavera presents today for follow-up of multiple concerns    DEPRESSION:  She continues Celexa with variable effectiveness, noting she is "still functioning" despite inconsistent medication adherence. She is experiencing significant stress related to childcare and reports lack of personal support since her mother's passing three years ago. She feels overwhelmed with parental responsibilities and limited opportunities for respite, noting she and her  have not been on vacation in four years. She is scheduled for a follow-up psychiatry visit in 3-4 months and has email access for interim communication if needed.    MUSCULOSKELETAL PAIN:  She reports ongoing neck, upper back, and left knee issues. She completed physical therapy and received dry needling treatment for neck and upper back pain. She experienced severe pain requiring Norco 5/325 twice in the last two weeks. Dry needling initially caused soreness but subsequently improved neck mobility, allowing full rotation. She notes chronic neck muscle spasms and is interested in further dry needling treatment. For her left knee, she describes recent swelling and tightness in one specific area without pain, instability, or giving way. She can climb stairs without discomfort but feels tightness when flexing. She is uncertain of the cause, speculating potential work-related strain or possible tendinitis.    HAIR LOSS:  She reports ongoing hair loss concerns and was evaluated by dermatology. She has been using prescribed minoxidil treatment consistently and taking OTC supplements. Her hair did not fully recover after previous ALL treatment. The dermatologist informed her that hair growth improvements may take 6-12 months to become noticeable.    CURRENT MEDICATIONS:  She continues Cymbalta and requires a Robaxin " refill. She took Norco 5/325 twice in the last two weeks for pain management.    LABS:  Metabolic panel showed normal results with good liver and kidney function. Thyroid tests were within normal limits.    Medications were reviewed          Problem List[1]    Current Medications[2]    Review of Systems   Constitutional:  Negative for activity change, appetite change, chills, diaphoresis, fatigue, fever and unexpected weight change.   HENT: Negative.  Negative for congestion, hearing loss, postnasal drip, rhinorrhea, sore throat, trouble swallowing and voice change.    Eyes: Negative.  Negative for visual disturbance.   Respiratory: Negative.  Negative for cough, choking, chest tightness and shortness of breath.    Cardiovascular:  Negative for chest pain, palpitations and leg swelling.   Gastrointestinal:  Negative for abdominal distention, abdominal pain, blood in stool, constipation, diarrhea, nausea and vomiting.   Endocrine: Negative for cold intolerance, heat intolerance, polydipsia and polyuria.   Genitourinary: Negative.  Negative for difficulty urinating and frequency.   Musculoskeletal:  Negative for arthralgias, back pain, gait problem, joint swelling and myalgias.   Skin:  Negative for color change, pallor, rash and wound.   Neurological:  Negative for dizziness, tremors, weakness, light-headedness, numbness and headaches.   Hematological:  Negative for adenopathy.   Psychiatric/Behavioral:  Negative for behavioral problems, confusion, self-injury, sleep disturbance and suicidal ideas. The patient is nervous/anxious.        ROS:  General: -fever, -chills, -fatigue, -weight gain, -weight loss  Eyes: -vision changes, -redness, -discharge  ENT: -ear pain, -nasal congestion, -sore throat  Cardiovascular: -chest pain, -palpitations, -lower extremity edema  Respiratory: -cough, -shortness of breath  Gastrointestinal: -abdominal pain, -nausea, -vomiting, -diarrhea, -constipation, -blood in stool  Genitourinary:  "-dysuria, -hematuria, -frequency  Musculoskeletal: -joint pain, -muscle pain, +neck pain, +back pain, +joint swelling, +limb swelling  Skin: -rash, -lesion, +abnormal hair loss  Neurological: -headache, -dizziness, -numbness, -tingling  Psychiatric: +anxiety, -depression, -sleep difficulty     Objective:   /62 (BP Location: Right arm, Patient Position: Sitting)   Pulse 92   Temp 98 °F (36.7 °C) (Tympanic)   Ht 5' 3" (1.6 m)   Wt 74.1 kg (163 lb 5.8 oz)   SpO2 98%   BMI 28.94 kg/m²     Physical Exam  Vitals and nursing note reviewed.   Constitutional:       General: She is not in acute distress.     Appearance: Normal appearance. She is well-developed. She is not ill-appearing, toxic-appearing or diaphoretic.   HENT:      Head: Normocephalic and atraumatic.      Right Ear: External ear normal.      Left Ear: External ear normal.      Nose: Nose normal.      Mouth/Throat:      Mouth: Mucous membranes are moist.      Pharynx: No oropharyngeal exudate or posterior oropharyngeal erythema.   Eyes:      General: No scleral icterus.        Right eye: No discharge.         Left eye: No discharge.      Conjunctiva/sclera: Conjunctivae normal.      Pupils: Pupils are equal, round, and reactive to light.   Neck:      Thyroid: No thyromegaly.      Vascular: Normal carotid pulses. No carotid bruit or JVD.   Cardiovascular:      Rate and Rhythm: Normal rate and regular rhythm.      Pulses: Normal pulses.      Heart sounds: Normal heart sounds. No murmur heard.     No friction rub. No gallop.   Pulmonary:      Effort: Pulmonary effort is normal. No accessory muscle usage or respiratory distress.      Breath sounds: Normal breath sounds. No stridor. No wheezing, rhonchi or rales.   Chest:      Chest wall: No tenderness.   Abdominal:      General: Bowel sounds are normal. There is no distension.      Palpations: Abdomen is soft. There is no mass.      Tenderness: There is no abdominal tenderness. There is no guarding or " rebound.   Musculoskeletal:         General: No swelling, tenderness, deformity or signs of injury. Normal range of motion.      Cervical back: Normal range of motion and neck supple. No edema.   Lymphadenopathy:      Cervical: No cervical adenopathy.   Skin:     General: Skin is warm and dry.      Coloration: Skin is not jaundiced or pale.      Findings: No bruising, erythema or rash.      Nails: There is no clubbing.   Neurological:      Mental Status: She is alert and oriented to person, place, and time.      Cranial Nerves: No cranial nerve deficit.      Sensory: No sensory deficit.      Motor: No abnormal muscle tone.      Coordination: Coordination normal.      Deep Tendon Reflexes: Reflexes are normal and symmetric. Reflexes normal.   Psychiatric:         Attention and Perception: Attention and perception normal.         Mood and Affect: Mood normal.         Speech: Speech normal.         Behavior: Behavior normal. Behavior is cooperative.         Thought Content: Thought content normal. Thought content is not paranoid or delusional. Thought content does not include homicidal or suicidal ideation. Thought content does not include homicidal or suicidal plan.         Cognition and Memory: Cognition and memory normal.         Judgment: Judgment normal.       Lab Visit on 07/09/2025   Component Date Value Ref Range Status    Sodium 07/09/2025 137  136 - 145 mmol/L Final    Potassium 07/09/2025 4.3  3.5 - 5.1 mmol/L Final    Chloride 07/09/2025 104  95 - 110 mmol/L Final    CO2 07/09/2025 28  23 - 29 mmol/L Final    Glucose 07/09/2025 107  70 - 110 mg/dL Final    BUN 07/09/2025 11  6 - 20 mg/dL Final    Creatinine 07/09/2025 0.6  0.5 - 1.4 mg/dL Final    Calcium 07/09/2025 8.7  8.7 - 10.5 mg/dL Final    Protein Total 07/09/2025 6.8  6.0 - 8.4 gm/dL Final    Albumin 07/09/2025 4.2  3.5 - 5.2 g/dL Final    Bilirubin Total 07/09/2025 0.9  0.1 - 1.0 mg/dL Final    For infants and newborns, interpretation of results  should be based   on gestational age, weight and in agreement with clinical   observations.    Premature Infant recommended reference ranges:   0-24 hours:  <8.0 mg/dL   24-48 hours: <12.0 mg/dL   3-5 days:    <15.0 mg/dL   6-29 days:   <15.0 mg/dL    ALP 07/09/2025 71  40 - 150 unit/L Final    AST 07/09/2025 26  11 - 45 unit/L Final    ALT 07/09/2025 37  10 - 44 unit/L Final    Anion Gap 07/09/2025 5 (L)  8 - 16 mmol/L Final    eGFR 07/09/2025 >60  >60 mL/min/1.73/m2 Final    Estimated GFR calculated using the CKD-EPI creatinine (2021) equation.    Hemoglobin A1c 07/09/2025 5.1  4.0 - 5.6 % Final    ADA Screening Guidelines:  5.7-6.4%  Consistent with prediabetes  >=6.5%  Consistent with diabetes    High levels of fetal hemoglobin interfere with the HbA1C  assay. Heterozygous hemoglobin variants (HbS, HgC, etc)do  not significantly interfere with this assay.   However, presence of multiple variants may affect accuracy.    Estimated Average Glucose 07/09/2025 100  68 - 131 mg/dL Final    Cholesterol Total 07/09/2025 157  120 - 199 mg/dL Final    The National Cholesterol Education Program (NCEP) has set the  following guidelines (reference ranges) for Cholesterol:  Optimal.....................<200 mg/dL  Borderline High.............200-239 mg/dL  High........................> or = 240 mg/dL    Triglyceride 07/09/2025 75  30 - 150 mg/dL Final    The National Cholesterol Education Program (NCEP) has set the  following guidelines (reference values) for triglycerides:  Normal......................<150 mg/dL  Borderline High.............150-199 mg/dL  High........................200-499 mg/dL    HDL Cholesterol 07/09/2025 48  40 - 75 mg/dL Final    The National Cholesterol Education Program (NCEP) has set the   following guidelines (reference values) for HDL Cholesterol:   Low...............<40 mg/dL   Optimal...........>60 mg/dL    LDL Cholesterol 07/09/2025 94.0  63.0 - 159.0 mg/dL Final    The National Cholesterol  Education Program (NCEP) has set the  following guidelines (reference values) for LDL Cholesterol:  Optimal.......................<130 mg/dL  Borderline High...............130-159 mg/dL  High..........................160-189 mg/dL  Very High.....................>190 mg/dL  LDL calculated using the Friedewald equation.    HDL/Cholesterol Ratio 07/09/2025 30.6  20.0 - 50.0 % Final    Cholesterol/HDL Ratio 07/09/2025 3.3  2.0 - 5.0 Final    Non HDL Cholesterol 07/09/2025 109  mg/dL Final    Risk category and Non-HDL cholesterol goals:  Coronary heart disease (CHD)or equivalent (10-year risk of CHD >20%):  Non-HDL cholesterol goal     <130 mg/dL  Two or more CHD risk factors and 10-year risk of CHD <= 20%:  Non-HDL cholesterol goal     <160 mg/dL  0 to 1 CHD risk factor:  Non-HDL cholesterol goal     <190 mg/dL    Urine Microalbumin 07/09/2025 18.0    ug/mL Final    Urine Creatinine 07/09/2025 175.0  15.0 - 325.0 mg/dL Final    Microalbumin/Creatinine Ratio Urine 07/09/2025 10.3  <=30.0 ug/mg Final    WBC 07/09/2025 7.88  3.90 - 12.70 K/uL Final    RBC 07/09/2025 4.48  4.00 - 5.40 M/uL Final    HGB 07/09/2025 13.3  12.0 - 16.0 gm/dL Final    HCT 07/09/2025 39.1  37.0 - 48.5 % Final    MCV 07/09/2025 87  82 - 98 fL Final    MCH 07/09/2025 29.7  27.0 - 31.0 pg Final    MCHC 07/09/2025 34.0  32.0 - 36.0 g/dL Final    RDW 07/09/2025 12.1  11.5 - 14.5 % Final    Platelet Count 07/09/2025 246  150 - 450 K/uL Final    MPV 07/09/2025 9.7  9.2 - 12.9 fL Final    Nucleated RBC 07/09/2025 0  <=0 /100 WBC Final    Neut % 07/09/2025 68.3  38 - 73 % Final    Lymph % 07/09/2025 23.1  18 - 48 % Final    Mono % 07/09/2025 6.3  4 - 15 % Final    Eos % 07/09/2025 1.5  <=8 % Final    Basophil % 07/09/2025 0.5  <=1.9 % Final    Imm Grans % 07/09/2025 0.3  0.0 - 0.5 % Final    Neut # 07/09/2025 5.38  1.8 - 7.7 K/uL Final    Lymph # 07/09/2025 1.82  1 - 4.8 K/uL Final    Mono # 07/09/2025 0.50  0.3 - 1 K/uL Final    Eos # 07/09/2025 0.12   <=0.5 K/uL Final    Baso # 07/09/2025 0.04  <=0.2 K/uL Final    Imm Grans # 07/09/2025 0.02  0.00 - 0.04 K/uL Final    Mild elevation in immature granulocytes is non specific and can be seen in a variety of conditions including stress response, acute inflammation, trauma and pregnancy. Correlation with other laboratory and clinical findings is essential.     Lab Results   Component Value Date    HGBA1C 5.1 07/09/2025       Assessment:     1. History of gestational diabetes    2. Encounter for annual physical exam    3. History of acute lymphoblastic leukemia (ALL)    4. Anxiety and depression    5. Effusion, left knee    6. Decreased range of motion of intervertebral discs of cervical spine    7. Overweight (BMI 25.0-29.9)    8. Hair loss disorder      Plan:   Encounter for annual physical exam  -     Hemoglobin A1C; Future; Expected date: 07/14/2026  -     CBC Auto Differential; Future; Expected date: 07/14/2026  -     Comprehensive Metabolic Panel; Future; Expected date: 07/14/2026  -     Lipid Panel; Future; Expected date: 07/14/2026  -     TSH; Future; Expected date: 07/14/2026  Reviewed labs: metabolic panel, liver, and renal function are good.  A1C monitoring no longer necessary as patient does not have diabetes.  Assessed neck and upper back pain, recommending compression and ice for swollen left knee.  Considered stress levels and impact on hair loss.      History of gestational diabetes  -A1c has been in normal range since her pregnancy    History of acute lymphoblastic leukemia (ALL)  -stable    Anxiety and depression  - Ms. Tavera reports mood is good but has been challenging due to personal circumstances, including her mother's passing and lack of vacation.  - She is still taking Celexa and Cymbalta and functioning at work despite these challenges.  - Ms. Tavera acknowledges the need for self-care and vacation to manage stress and depressive symptoms.  - Will continue both Celexa and Cymbalta for mood  management.    Decreased range of motion of intervertebral discs of cervical spine  -     methocarbamoL (ROBAXIN) 750 MG Tab; Take 1 tablet (750 mg total) by mouth 3 (three) times daily as needed (neck pain).  Dispense: 40 tablet; Refill: 1  - Ms. Tavera reports neck pain and tightness.  - She has been doing physical therapy and dry needling with noted improvement after dry needling, though she experienced initial soreness.  - Will continue Robaxin 750 mg as needed for neck and upper back pain.  - Ongoing management will include potential continued use of dry needling and physical therapy.  - Ms. Tavera reports muscle spasms in the neck, particularly in the upper trapezius and occipital areas.  - Will continue Robaxin 750 mg as needed, which also addresses the neck pain.    Overweight (BMI 25.0-29.9)  -     Ambulatory referral/consult to [facility]; Future; Expected date: 07/21/2025    Hair loss disorder  - Ms. Tavera reports ongoing hair loss and has been using prescribed oil, minoxidil, and OTC biotin without significant improvement since having ALL.  - She is considering Nutrafol for hair growth but is concerned about cost and time to see results.  - Provided information on hair growth supplements and timeline for noticing results.      LEFT KNEE EFFUSION AND PAIN:  - Ms. Tavera reports left knee swelling and tightness, but no pain or instability.  - Instructed patient to apply compression (e.g., knee brace or copper sleeve) and ice to reduce inflammation in the swollen left knee.  - Advised to monitor for instability or clicking, and to see orthopedist if symptoms worsen.    FOLLOW-UP AND GENERAL RECOMMENDATIONS:  - Scheduled follow up in 1 year for annual labs, exam, and A1C recheck.  - Discussed relationship between carbohydrates and blood sugar.  - Recommend implementing smaller, more frequent meals throughout the day, with largest meal earlier in the day and smallest meal at night.  -  Ms. Tavera should aim for 100 oz of water daily and use [employer] celestino to track food intake and calories.  - Suggested meal prepping to support healthier eating habits.  - Referred to lifestyle wellness clinic for nutrition counseling and weight management strategies.       This note was generated with the assistance of ambient listening technology. Verbal consent was obtained by the patient and accompanying visitor(s) for the recording of patient appointment to facilitate this note. I attest to having reviewed and edited the generated note for accuracy, though some syntax or spelling errors may persist. Please contact the author of this note for any clarification.      Follow up in about 1 year (around 7/14/2026), or if symptoms worsen or fail to improve.           [1]   Patient Active Problem List  Diagnosis    Anxiety and depression    History of gestational diabetes    History of acute lymphoblastic leukemia (ALL)    Decreased range of motion of intervertebral discs of cervical spine    Upper extremity weakness    Hair loss disorder    Overweight (BMI 25.0-29.9)   [2]   Current Outpatient Medications:     ALPRAZolam (XANAX) 0.25 MG tablet, Take 0.25 mg by mouth., Disp: , Rfl:     citalopram (CELEXA) 40 MG tablet, Take 40 mg by mouth once daily., Disp: , Rfl:     clonazePAM (KLONOPIN) 1 MG tablet, Take 1 mg by mouth 2 (two) times daily as needed for Anxiety., Disp: , Rfl:     DULoxetine (CYMBALTA) 20 MG capsule, Take 20 mg by mouth once daily., Disp: , Rfl:     levonorgestreL (MIRENA) 21 mcg/24 hours (8 yrs) 52 mg IUD, 1 each by Intrauterine route once., Disp: , Rfl:     minoxidiL 5 % Soln, Apply to scalp once daily. Do not allow to drip onto face., Disp: 60 mL, Rfl: 11    pantoprazole (PROTONIX) 40 MG tablet, Take 1 tablet (40 mg total) by mouth once daily., Disp: 90 tablet, Rfl: 3    methocarbamoL (ROBAXIN) 750 MG Tab, Take 1 tablet (750 mg total) by mouth 3 (three) times daily as needed (neck pain)., Disp:  40 tablet, Rfl: 1

## 2025-07-15 ENCOUNTER — PATIENT MESSAGE (OUTPATIENT)
Dept: INTERNAL MEDICINE | Facility: CLINIC | Age: 35
End: 2025-07-15
Payer: COMMERCIAL

## 2025-07-15 DIAGNOSIS — M25.462 EFFUSION, LEFT KNEE: Primary | ICD-10-CM

## 2025-07-25 DIAGNOSIS — M25.562 LEFT KNEE PAIN, UNSPECIFIED CHRONICITY: Primary | ICD-10-CM

## 2025-07-28 ENCOUNTER — OFFICE VISIT (OUTPATIENT)
Dept: ORTHOPEDICS | Facility: CLINIC | Age: 35
End: 2025-07-28
Payer: COMMERCIAL

## 2025-07-28 ENCOUNTER — HOSPITAL ENCOUNTER (OUTPATIENT)
Dept: RADIOLOGY | Facility: HOSPITAL | Age: 35
Discharge: HOME | End: 2025-07-28
Payer: COMMERCIAL

## 2025-07-28 VITALS — BODY MASS INDEX: 28.88 KG/M2 | WEIGHT: 163 LBS | HEIGHT: 63 IN

## 2025-07-28 DIAGNOSIS — M25.562 LEFT KNEE PAIN, UNSPECIFIED CHRONICITY: ICD-10-CM

## 2025-07-28 DIAGNOSIS — M25.462 EFFUSION, LEFT KNEE: ICD-10-CM

## 2025-07-28 PROCEDURE — 1159F MED LIST DOCD IN RCRD: CPT | Mod: CPTII,S$GLB,,

## 2025-07-28 PROCEDURE — 3008F BODY MASS INDEX DOCD: CPT | Mod: CPTII,S$GLB,,

## 2025-07-28 PROCEDURE — 3044F HG A1C LEVEL LT 7.0%: CPT | Mod: CPTII,S$GLB,,

## 2025-07-28 PROCEDURE — 73564 X-RAY EXAM KNEE 4 OR MORE: CPT | Mod: TC,LT

## 2025-07-28 PROCEDURE — 99204 OFFICE O/P NEW MOD 45 MIN: CPT | Mod: S$GLB,,,

## 2025-07-28 PROCEDURE — 99999 PR PBB SHADOW E&M-EST. PATIENT-LVL III: CPT | Mod: PBBFAC,,,

## 2025-07-28 PROCEDURE — 73564 X-RAY EXAM KNEE 4 OR MORE: CPT | Mod: 26,LT,, | Performed by: RADIOLOGY

## 2025-07-28 PROCEDURE — 3061F NEG MICROALBUMINURIA REV: CPT | Mod: CPTII,S$GLB,,

## 2025-07-28 PROCEDURE — 3066F NEPHROPATHY DOC TX: CPT | Mod: CPTII,S$GLB,,

## 2025-07-29 NOTE — PROGRESS NOTES
[id number] [facility] [place] [address]   Phone [phone]  Fax [phone]           CHIEF COMPLAINT:   Chief Complaint   Patient presents with    Left Knee - Pain, Swelling     Pt to clinic for left knee pains/swelling x2 weeks  Pain:0/10        HISTORY OF PRESENT ILLNESS (BN) (07/28/2025):    Terri presents with left knee pain and swelling. She noticed edema around the knee approximately 2 weeks ago, which has since mostly resolved. She experienced tightness when flexing the knee, particularly noticeable when trying to sleep on her side. She could flex it fully but it felt very tight. She denies significant pain, stating that it never was significantly painful. She also denies instability or buckling when walking. The symptoms did not interfere with her work. She speculates that the issue may have been caused by kneeling to bathe her 2.5-year-old twin boys.    Upon noticing the swelling, she consulted her primary care provider, who confirmed the swelling. She followed advice to use a compression sleeve and ice the area 2-3 times daily for about three days, especially while working.  She now reports 0/10 pain.  She takes Tylenol and Advil as needed for pain.  She is an occupational therapist at [employer].      PAST MEDICAL HISTORY:    Past Medical History:   Diagnosis Date    ALL (acute lymphoblastic leukemia)     Allergy     Anxiety     Depression     Encounter for blood transfusion     Fever blister     History of cholecystectomy     Hypogammaglobulinaemia, unspecified     Irritable bowel syndrome     Type 2 diabetes mellitus with hyperglycemia, without long-term current use of insulin 09/25/2023    Likely steroid induced, with a history of gestational diabetes         Hemoglobin A1C   Date Value Ref Range Status   01/03/2025 4.8 4.0 - 5.6 % Final     Comment:     ADA Screening Guidelines:  5.7-6.4%  Consistent with prediabetes  >or=6.5%  Consistent with diabetes    High  levels of fetal hemoglobin interfere with the HbA1C  assay. Heterozygous hemoglobin variants (HbS, HgC, etc)do  not significantly interfere with this assay.   However, presence of multiple variants may affect accuracy.     2024 4.8 4.0 - 5.6 % Final     Comment:     ADA Screening Guidelines:  5.7-6.4%  Consistent with prediabetes  >or=6.5%  Consistent with diabetes    High levels of fetal hemoglobin interfere with the HbA1C  assay. Heterozygous hemoglobin variants (HbS, HgC, etc)do  not significantly interfere with this assay.   However, presence of multiple variants may affect accuracy.     10/23/2023 5.0 4.0 - 5.6 % Final     Comment:     ADA Screening Guidelines:  5.7-6.4%  Consistent with prediabetes  >or=6.5%  Consistent with diabetes    High levels of fetal hemoglobin interfere with the HbA1C  assay. Heterozygous hemoglobin variants (HbS, HgC, etc)do  not significantly interfere with this assay.   However, presence of multiple variants may affect accuracy.       Hemoglobin A1c   Date Value Ref Range Status   2025 5.1 4.0 - 5.6 % Final     Comment:     ADA Screening Guidelines:  5.7-6.4%  Consistent with prediabetes  >=6.5%  Consistent with diabetes    High levels of fetal hemoglobin interfere with the HbA1C  assay. Heterozygous hemoglobin variants (HbS, HgC, etc)do  not significantly interfere with this assay.   However, presence of multiple variants may affect accuracy.        PAST SURGICAL HISTORY:    Past Surgical History:   Procedure Laterality Date    ABDOMINAL SURGERY  2011    Cholecystectomy     BONE MARROW BIOPSY      Central port and subsequent removal       SECTION  2023    CHOLECYSTECTOMY  2011    lap    impant screws      INCISION AND DRAINAGE OF WOUND      [employer]    INTRATHECAL PUMP IMPLANTATION      for chemo    MULTIPLE TOOTH EXTRACTIONS      TONSILLECTOMY          MEDICATIONS:  Current Medications[1]     There are no discontinued medications.      ALLERGIES:      Review of patient's allergies indicates:   Allergen Reactions    Cephalosporins Hives     duracef only    Vitamin k analogues     Duricef [cefadroxil] Hives     Hives and fever    Vitamin k      Other reaction(s): Discolored leg skin    Bactrim [sulfamethoxazole-trimethoprim] Nausea And Vomiting            FAMILY HISTORY:   Family History   Problem Relation Name Age of Onset    Ulcerative colitis Father Bird     Heart disease Father Bird     Diabetes Father Bird     Hyperlipidemia Father Bird     Cataracts Father Bird     Arthritis Father Bird     Depression Father Bird     Hyperlipidemia Mother Naina     Hashimoto's thyroiditis Mother Naina     Cancer Mother Naina         Gallbladder cancer    Thyroid disease Mother Naina         Hashinotos    Ulcerative colitis Paternal Aunt Gwendolyn Tavera     Breast cancer Paternal Aunt Gwendolyn Tavera     Diabetes Maternal Grandmother Alana         Parkinsons    Arthritis Maternal Grandmother Alana     Cancer Maternal Grandfather Lung         lung cancer     Ulcerative colitis Paternal Grandmother Throat     Cancer Paternal Grandmother Throat         esophageal cancer     Heart disease Paternal Grandfather Fortino     Celiac disease Neg Hx      Crohn's disease Neg Hx      Ovarian cancer Neg Hx      Colon cancer Neg Hx      Melanoma Neg Hx      Amblyopia Neg Hx      Blindness Neg Hx      Glaucoma Neg Hx      Macular degeneration Neg Hx      Retinal detachment Neg Hx      Strabismus Neg Hx             SOCIAL HISTORY:    Social History     Socioeconomic History    Marital status:    Tobacco Use    Smoking status: Never    Smokeless tobacco: Never   Substance and Sexual Activity    Alcohol use: Yes     Alcohol/week: 2.0 standard drinks of alcohol     Types: 2 Glasses of wine per week     Comment: twice/week    Drug use: Never    Sexual activity: Yes     Partners: Male     Birth control/protection: I.U.D., Partner-Vasectomy     Comment: IUD put back in 03/2023  "    Social Drivers of Health     Financial Resource Strain: Low Risk  (7/13/2025)    Overall Financial Resource Strain (CARDIA)     Difficulty of Paying Living Expenses: Not very hard   Food Insecurity: No Food Insecurity (7/13/2025)    Hunger Vital Sign     Worried About Running Out of Food in the Last Year: Never true     Ran Out of Food in the Last Year: Never true   Transportation Needs: No Transportation Needs (7/13/2025)    PRAPARE - Transportation     Lack of Transportation (Medical): No     Lack of Transportation (Non-Medical): No   Physical Activity: Insufficiently Active (7/13/2025)    Exercise Vital Sign     Days of Exercise per Week: 2 days     Minutes of Exercise per Session: 20 min   Stress: Stress Concern Present (7/13/2025)    [employer] - Occupational Stress Questionnaire     Feeling of Stress : Rather much   Housing Stability: Low Risk  (7/13/2025)    Housing Stability Vital Sign     Unable to Pay for Housing in the Last Year: No     Number of Times Moved in the Last Year: 0     Homeless in the Last Year: No         PHYSICAL EXAMINATION:     Estimated body mass index is 28.87 kg/m² as calculated from the following:    Height as of this encounter: 5' 3" (1.6 m).    Weight as of this encounter: 73.9 kg (163 lb).   ASSISTIVE DEVICE:  None    MUSCULOSKELETAL:    Knee Exam (Left extremity):   Inspection:    Skin:    Discoloration   (-)   Gross deformity   (-)    Open wounds   (-)   Surgical scars   (-)      Soft tissue:     Swelling/Knee Effusion  (-)   Muscle atrophy   (-)   Palpation tenderness:    Bony:     Tibial tubercle   (-)   Patella    (-)   Tibial plateau   (-)   Femoral condyle  (-)   Soft tissue:    Pes anserine bursae  (-)   Patellar tendon   (-)   Quadriceps tendon  (-)   MCL    (-)   LCL    (-)  ITB    (-)   ROM:   Affected extremity:        Extension:   0°            Flexion    130°            Pain    (-)           Crepitance   (-)           Sensory:   "   Normal sensation to light touch in Sa/Mendez/DP/SP/T nerve distributions    Motor:                  Fires EHL/FHL/Tibialis anterior/Gastrocsoleus   Vascular:                 Foot WWP with brisk capillary refill      DP/PT pulses palpable   Special tests:      Medial JLT   (-)   Lateral JLT   (-)    Apley/Adoniss test  (-)   Patellar grind/Casey's Test (-)   Patellar apprehension  (-).       IMAGING:      X-ray Knee Ortho Left with Flexion (XPD)  Narrative: EXAM:  XR KNEE ORTHO LEFT WITH FLEXION (XPD)    CLINICAL HISTORY:    Left knee joint pain    TECHNIQUE: 4 views of the left knee.    COMPARISON: None.    FINDINGS:    Bone density and architecture are normal. No acute findings.  Impression:  Negative study    Finalized on: 7/28/2025 12:04 PM By:  Dmitry Santiago MD  [facility]# [id number]      2025-07-28 12:06:[id number]     [facility]           ASSESSMENT: 35 y.o. female  with:   Left knee pain and swelling    PLAN:  Data:   I independently visualized images including but not limited to xrays, MRI, or CT scan today.   I reviewed available old/outside records.   PT/OT:   Her symptoms have now resolved, consider PT if symptoms return.  Medications:    No new medications prescribed today.    DME and weight bearing status:    Activities as tolerated.  Avoid kneeling activities which likely caused her knee swelling.  Advanced Imaging:   Consider advanced imaging if symptoms return.    Work/school release/restrictions:   She is an occupational therapist at [employer]   Education:    I had a long discussion with the patient about the causes, treatments, and prognosis/natural history for their diagnosis. We discussed effective ways to improve symptoms as well as what types of activities may make the symptoms/prognosis worse. We discussed signs and symptoms and other reasons to return to clinic sooner.    Return to clinic:    As needed              Provider Signature: Kaylee Reddy PA-C  Official Website  Schedule An Appointment    This  note was generated with the assistance of ambient listening technology. Verbal consent was obtained by the patient and accompanying visitor(s) for the recording of patient appointment to facilitate this note. I attest to having reviewed and edited the generated note for accuracy, though some syntax or spelling errors may persist. Please contact the author of this note for any clarification.         [1]   Current Outpatient Medications:     ALPRAZolam (XANAX) 0.25 MG tablet, Take 0.25 mg by mouth., Disp: , Rfl:     citalopram (CELEXA) 40 MG tablet, Take 40 mg by mouth once daily., Disp: , Rfl:     clonazePAM (KLONOPIN) 1 MG tablet, Take 1 mg by mouth 2 (two) times daily as needed for Anxiety., Disp: , Rfl:     DULoxetine (CYMBALTA) 20 MG capsule, Take 20 mg by mouth once daily., Disp: , Rfl:     levonorgestreL (MIRENA) 21 mcg/24 hours (8 yrs) 52 mg IUD, 1 each by Intrauterine route once., Disp: , Rfl:     methocarbamoL (ROBAXIN) 750 MG Tab, Take 1 tablet (750 mg total) by mouth 3 (three) times daily as needed (neck pain)., Disp: 40 tablet, Rfl: 1    minoxidiL 5 % Soln, Apply to scalp once daily. Do not allow to drip onto face., Disp: 60 mL, Rfl: 11    pantoprazole (PROTONIX) 40 MG tablet, Take 1 tablet (40 mg total) by mouth once daily., Disp: 90 tablet, Rfl: 3